# Patient Record
Sex: FEMALE | Race: BLACK OR AFRICAN AMERICAN | Employment: PART TIME
[De-identification: names, ages, dates, MRNs, and addresses within clinical notes are randomized per-mention and may not be internally consistent; named-entity substitution may affect disease eponyms.]

---

## 2017-02-03 ENCOUNTER — OFFICE VISIT (OUTPATIENT)
Dept: INTERNAL MEDICINE | Facility: CLINIC | Age: 47
End: 2017-02-03

## 2017-02-03 VITALS
OXYGEN SATURATION: 99 % | BODY MASS INDEX: 43.05 KG/M2 | WEIGHT: 228 LBS | RESPIRATION RATE: 17 BRPM | DIASTOLIC BLOOD PRESSURE: 68 MMHG | TEMPERATURE: 98 F | HEART RATE: 75 BPM | HEIGHT: 61 IN | SYSTOLIC BLOOD PRESSURE: 128 MMHG

## 2017-02-03 DIAGNOSIS — J45.30 MILD PERSISTENT ASTHMA WITHOUT COMPLICATION: ICD-10-CM

## 2017-02-03 DIAGNOSIS — K21.9 GASTROESOPHAGEAL REFLUX DISEASE WITHOUT ESOPHAGITIS: ICD-10-CM

## 2017-02-03 DIAGNOSIS — E66.01 MORBID OBESITY WITH BMI OF 40.0-44.9, ADULT (HCC): ICD-10-CM

## 2017-02-03 DIAGNOSIS — I10 ESSENTIAL HYPERTENSION: ICD-10-CM

## 2017-02-03 DIAGNOSIS — F41.9 ANXIETY: ICD-10-CM

## 2017-02-03 DIAGNOSIS — R53.83 OTHER FATIGUE: Primary | ICD-10-CM

## 2017-02-03 DIAGNOSIS — K59.09 OTHER CONSTIPATION: ICD-10-CM

## 2017-02-03 DIAGNOSIS — R73.01 IMPAIRED FASTING GLUCOSE: ICD-10-CM

## 2017-02-03 PROCEDURE — 99214 OFFICE O/P EST MOD 30 MIN: CPT | Performed by: FAMILY MEDICINE

## 2017-02-03 RX ORDER — DIAZEPAM 10 MG/1
TABLET ORAL
COMMUNITY
Start: 2017-01-25 | End: 2018-04-11 | Stop reason: ALTCHOICE

## 2017-02-03 RX ORDER — POLYETHYLENE GLYCOL 3350 17 G/17G
17 POWDER, FOR SOLUTION ORAL DAILY
Qty: 510 G | Refills: 2 | Status: SHIPPED | OUTPATIENT
Start: 2017-02-03 | End: 2017-03-05

## 2017-02-03 ASSESSMENT — ENCOUNTER SYMPTOMS
EYES NEGATIVE: 1
ALLERGIC/IMMUNOLOGIC NEGATIVE: 1
GASTROINTESTINAL NEGATIVE: 1
RESPIRATORY NEGATIVE: 1

## 2017-02-03 ASSESSMENT — PATIENT HEALTH QUESTIONNAIRE - PHQ9
SUM OF ALL RESPONSES TO PHQ QUESTIONS 1-9: 0
2. FEELING DOWN, DEPRESSED OR HOPELESS: 0
1. LITTLE INTEREST OR PLEASURE IN DOING THINGS: 0
SUM OF ALL RESPONSES TO PHQ9 QUESTIONS 1 & 2: 0

## 2017-02-07 RX ORDER — ALBUTEROL SULFATE 2.5 MG/3ML
2.5 SOLUTION RESPIRATORY (INHALATION) EVERY 6 HOURS PRN
Qty: 120 EACH | Refills: 3 | Status: SHIPPED | OUTPATIENT
Start: 2017-02-07

## 2017-02-08 RX ORDER — ALBUTEROL SULFATE 90 UG/1
2 AEROSOL, METERED RESPIRATORY (INHALATION) EVERY 6 HOURS PRN
Qty: 8.5 G | Refills: 4 | Status: SHIPPED | OUTPATIENT
Start: 2017-02-08 | End: 2017-09-07 | Stop reason: SDUPTHER

## 2017-02-15 ENCOUNTER — TELEPHONE (OUTPATIENT)
Dept: INTERNAL MEDICINE | Facility: CLINIC | Age: 47
End: 2017-02-15

## 2017-02-16 RX ORDER — FLUCONAZOLE 150 MG/1
150 TABLET ORAL ONCE
Qty: 1 TABLET | Refills: 0 | Status: SHIPPED | OUTPATIENT
Start: 2017-02-16 | End: 2017-02-16

## 2017-02-20 RX ORDER — FLUTICASONE PROPIONATE 50 MCG
SPRAY, SUSPENSION (ML) NASAL
Qty: 1 BOTTLE | Refills: 5 | Status: SHIPPED | OUTPATIENT
Start: 2017-02-20 | End: 2017-09-07 | Stop reason: SDUPTHER

## 2017-04-13 DIAGNOSIS — Z02.1 PHYSICAL EXAM, PRE-EMPLOYMENT: Primary | ICD-10-CM

## 2017-04-18 ENCOUNTER — TELEPHONE (OUTPATIENT)
Dept: INTERNAL MEDICINE CLINIC | Age: 47
End: 2017-04-18

## 2017-04-19 ENCOUNTER — HOSPITAL ENCOUNTER (OUTPATIENT)
Age: 47
Setting detail: SPECIMEN
Discharge: HOME | End: 2017-04-19
Payer: MEDICARE

## 2017-04-19 DIAGNOSIS — Z02.1 PHYSICAL EXAM, PRE-EMPLOYMENT: ICD-10-CM

## 2017-04-19 LAB — RUBV IGG SER QL: 28.5 IU/ML

## 2017-04-19 RX ORDER — BUSPIRONE HYDROCHLORIDE 15 MG/1
15 TABLET ORAL 3 TIMES DAILY
Qty: 90 TABLET | Refills: 0 | Status: SHIPPED | OUTPATIENT
Start: 2017-04-19 | End: 2018-04-11 | Stop reason: DRUGHIGH

## 2017-04-21 ENCOUNTER — TELEPHONE (OUTPATIENT)
Dept: INTERNAL MEDICINE CLINIC | Age: 47
End: 2017-04-21

## 2017-04-21 LAB
MEASLES IMMUNE (IGG): 4
MUV IGG SER QL: 5.58

## 2017-05-04 ENCOUNTER — TELEPHONE (OUTPATIENT)
Dept: INTERNAL MEDICINE CLINIC | Age: 47
End: 2017-05-04

## 2017-05-04 RX ORDER — HYDROCHLOROTHIAZIDE 25 MG/1
TABLET ORAL
Qty: 30 TABLET | Refills: 3 | Status: SHIPPED | OUTPATIENT
Start: 2017-05-04 | End: 2018-04-11 | Stop reason: ALTCHOICE

## 2017-05-04 RX ORDER — GLUCOSAMINE HCL/CHONDROITIN SU 500-400 MG
CAPSULE ORAL
Qty: 100 STRIP | Refills: 3 | Status: SHIPPED | OUTPATIENT
Start: 2017-05-04 | End: 2017-05-05 | Stop reason: SDUPTHER

## 2017-05-04 RX ORDER — LANSOPRAZOLE 30 MG/1
CAPSULE, DELAYED RELEASE ORAL
Qty: 30 CAPSULE | Refills: 3 | Status: SHIPPED | OUTPATIENT
Start: 2017-05-04 | End: 2018-03-21 | Stop reason: SDUPTHER

## 2017-05-06 RX ORDER — LANCETS 30 GAUGE
EACH MISCELLANEOUS
Qty: 100 EACH | Refills: 3 | Status: SHIPPED | OUTPATIENT
Start: 2017-05-06

## 2017-05-06 RX ORDER — GLUCOSAMINE HCL/CHONDROITIN SU 500-400 MG
CAPSULE ORAL
Qty: 100 STRIP | Refills: 3 | Status: SHIPPED | OUTPATIENT
Start: 2017-05-06 | End: 2020-03-24 | Stop reason: SDUPTHER

## 2017-07-20 ENCOUNTER — TELEPHONE (OUTPATIENT)
Dept: INTERNAL MEDICINE CLINIC | Age: 47
End: 2017-07-20

## 2017-07-20 RX ORDER — VARENICLINE TARTRATE 25 MG
KIT ORAL
Qty: 53 TABLET | Refills: 0 | Status: SHIPPED | OUTPATIENT
Start: 2017-07-20 | End: 2019-12-24 | Stop reason: ALTCHOICE

## 2017-08-11 ENCOUNTER — TELEPHONE (OUTPATIENT)
Dept: INTERNAL MEDICINE CLINIC | Age: 47
End: 2017-08-11

## 2017-08-14 RX ORDER — IBUPROFEN 800 MG/1
800 TABLET ORAL EVERY 8 HOURS PRN
Qty: 30 TABLET | Refills: 0 | Status: SHIPPED | OUTPATIENT
Start: 2017-08-14 | End: 2017-09-15 | Stop reason: SDUPTHER

## 2017-09-07 ENCOUNTER — OFFICE VISIT (OUTPATIENT)
Dept: INTERNAL MEDICINE CLINIC | Age: 47
End: 2017-09-07
Payer: MEDICARE

## 2017-09-07 VITALS
DIASTOLIC BLOOD PRESSURE: 88 MMHG | HEART RATE: 102 BPM | SYSTOLIC BLOOD PRESSURE: 130 MMHG | WEIGHT: 232.6 LBS | TEMPERATURE: 98.5 F | BODY MASS INDEX: 43.92 KG/M2 | OXYGEN SATURATION: 100 % | HEIGHT: 61 IN

## 2017-09-07 DIAGNOSIS — R73.01 IMPAIRED FASTING GLUCOSE: ICD-10-CM

## 2017-09-07 DIAGNOSIS — M21.41 PES PLANUS OF BOTH FEET: ICD-10-CM

## 2017-09-07 DIAGNOSIS — G89.4 CHRONIC PAIN SYNDROME: ICD-10-CM

## 2017-09-07 DIAGNOSIS — J45.20 MILD INTERMITTENT ASTHMA WITHOUT COMPLICATION: ICD-10-CM

## 2017-09-07 DIAGNOSIS — M21.42 PES PLANUS OF BOTH FEET: ICD-10-CM

## 2017-09-07 DIAGNOSIS — L30.4 INTERTRIGO: Primary | ICD-10-CM

## 2017-09-07 DIAGNOSIS — I10 ESSENTIAL HYPERTENSION: ICD-10-CM

## 2017-09-07 DIAGNOSIS — F41.9 ANXIETY: ICD-10-CM

## 2017-09-07 PROCEDURE — 99214 OFFICE O/P EST MOD 30 MIN: CPT | Performed by: FAMILY MEDICINE

## 2017-09-07 RX ORDER — FLUTICASONE PROPIONATE 50 MCG
SPRAY, SUSPENSION (ML) NASAL
Qty: 1 BOTTLE | Refills: 5 | Status: SHIPPED | OUTPATIENT
Start: 2017-09-07 | End: 2018-04-11 | Stop reason: SDUPTHER

## 2017-09-07 RX ORDER — ALBUTEROL SULFATE 90 UG/1
2 AEROSOL, METERED RESPIRATORY (INHALATION) EVERY 6 HOURS PRN
Qty: 8.5 G | Refills: 4 | Status: SHIPPED | OUTPATIENT
Start: 2017-09-07 | End: 2018-04-11 | Stop reason: SDUPTHER

## 2017-09-07 RX ORDER — NYSTATIN 100000 [USP'U]/G
POWDER TOPICAL
Qty: 1 BOTTLE | Refills: 2 | Status: SHIPPED | OUTPATIENT
Start: 2017-09-07 | End: 2018-04-11 | Stop reason: SDUPTHER

## 2017-09-07 ASSESSMENT — ENCOUNTER SYMPTOMS
EYES NEGATIVE: 1
GASTROINTESTINAL NEGATIVE: 1
ALLERGIC/IMMUNOLOGIC NEGATIVE: 1
RESPIRATORY NEGATIVE: 1

## 2017-09-18 RX ORDER — IBUPROFEN 800 MG/1
TABLET ORAL
Qty: 30 TABLET | Refills: 0 | Status: SHIPPED | OUTPATIENT
Start: 2017-09-18 | End: 2017-12-23 | Stop reason: SDUPTHER

## 2017-09-20 RX ORDER — BUSPIRONE HYDROCHLORIDE 10 MG/1
TABLET ORAL
Qty: 90 TABLET | Refills: 0 | Status: SHIPPED | OUTPATIENT
Start: 2017-09-20 | End: 2017-10-03 | Stop reason: SDUPTHER

## 2017-10-03 RX ORDER — BUSPIRONE HYDROCHLORIDE 10 MG/1
TABLET ORAL
Qty: 90 TABLET | Refills: 0 | Status: SHIPPED | OUTPATIENT
Start: 2017-10-03 | End: 2019-12-24 | Stop reason: ALTCHOICE

## 2017-12-26 RX ORDER — IBUPROFEN 800 MG/1
TABLET ORAL
Qty: 30 TABLET | Refills: 0 | Status: SHIPPED | OUTPATIENT
Start: 2017-12-26 | End: 2018-04-20 | Stop reason: SDUPTHER

## 2018-01-03 ENCOUNTER — TELEPHONE (OUTPATIENT)
Dept: INTERNAL MEDICINE CLINIC | Age: 48
End: 2018-01-03

## 2018-01-08 ENCOUNTER — TELEPHONE (OUTPATIENT)
Dept: INTERNAL MEDICINE CLINIC | Age: 48
End: 2018-01-08

## 2018-01-09 ENCOUNTER — TELEPHONE (OUTPATIENT)
Dept: INTERNAL MEDICINE CLINIC | Age: 48
End: 2018-01-09

## 2018-01-19 ENCOUNTER — TELEPHONE (OUTPATIENT)
Dept: INTERNAL MEDICINE CLINIC | Age: 48
End: 2018-01-19

## 2018-01-19 NOTE — TELEPHONE ENCOUNTER
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

## 2018-03-21 RX ORDER — LANSOPRAZOLE 30 MG/1
30 CAPSULE, DELAYED RELEASE ORAL DAILY
Qty: 30 CAPSULE | Refills: 3 | Status: SHIPPED | OUTPATIENT
Start: 2018-03-21

## 2018-03-26 RX ORDER — LISINOPRIL AND HYDROCHLOROTHIAZIDE 25; 20 MG/1; MG/1
TABLET ORAL
Qty: 30 TABLET | Refills: 3 | Status: SHIPPED | OUTPATIENT
Start: 2018-03-26 | End: 2018-09-02 | Stop reason: SDUPTHER

## 2018-04-11 ENCOUNTER — OFFICE VISIT (OUTPATIENT)
Dept: INTERNAL MEDICINE CLINIC | Age: 48
End: 2018-04-11
Payer: MEDICARE

## 2018-04-11 VITALS
RESPIRATION RATE: 21 BRPM | HEIGHT: 61 IN | SYSTOLIC BLOOD PRESSURE: 120 MMHG | HEART RATE: 83 BPM | OXYGEN SATURATION: 98 % | BODY MASS INDEX: 45.31 KG/M2 | WEIGHT: 240 LBS | DIASTOLIC BLOOD PRESSURE: 80 MMHG

## 2018-04-11 DIAGNOSIS — R06.83 SNORES: ICD-10-CM

## 2018-04-11 DIAGNOSIS — F41.9 ANXIETY: ICD-10-CM

## 2018-04-11 DIAGNOSIS — G89.4 CHRONIC PAIN SYNDROME: ICD-10-CM

## 2018-04-11 DIAGNOSIS — I10 ESSENTIAL HYPERTENSION: ICD-10-CM

## 2018-04-11 DIAGNOSIS — Z71.6 TOBACCO ABUSE COUNSELING: ICD-10-CM

## 2018-04-11 DIAGNOSIS — R73.01 IMPAIRED FASTING GLUCOSE: ICD-10-CM

## 2018-04-11 DIAGNOSIS — E66.01 MORBID OBESITY WITH BMI OF 40.0-44.9, ADULT (HCC): ICD-10-CM

## 2018-04-11 DIAGNOSIS — Z72.0 TOBACCO ABUSE: ICD-10-CM

## 2018-04-11 DIAGNOSIS — J44.9 CHRONIC OBSTRUCTIVE PULMONARY DISEASE, UNSPECIFIED COPD TYPE (HCC): Primary | ICD-10-CM

## 2018-04-11 DIAGNOSIS — E66.01 MORBID OBESITY WITH BMI OF 45.0-49.9, ADULT (HCC): ICD-10-CM

## 2018-04-11 PROCEDURE — G8427 DOCREV CUR MEDS BY ELIG CLIN: HCPCS | Performed by: FAMILY MEDICINE

## 2018-04-11 PROCEDURE — 4004F PT TOBACCO SCREEN RCVD TLK: CPT | Performed by: FAMILY MEDICINE

## 2018-04-11 PROCEDURE — G8926 SPIRO NO PERF OR DOC: HCPCS | Performed by: FAMILY MEDICINE

## 2018-04-11 PROCEDURE — 3023F SPIROM DOC REV: CPT | Performed by: FAMILY MEDICINE

## 2018-04-11 PROCEDURE — 99214 OFFICE O/P EST MOD 30 MIN: CPT | Performed by: FAMILY MEDICINE

## 2018-04-11 PROCEDURE — G8417 CALC BMI ABV UP PARAM F/U: HCPCS | Performed by: FAMILY MEDICINE

## 2018-04-11 RX ORDER — NYSTATIN 100000 [USP'U]/G
POWDER TOPICAL
Qty: 1 BOTTLE | Refills: 2 | Status: SHIPPED | OUTPATIENT
Start: 2018-04-11 | End: 2019-12-24 | Stop reason: ALTCHOICE

## 2018-04-11 RX ORDER — FLUTICASONE PROPIONATE 50 MCG
SPRAY, SUSPENSION (ML) NASAL
Qty: 1 BOTTLE | Refills: 5 | Status: SHIPPED | OUTPATIENT
Start: 2018-04-11 | End: 2019-12-13

## 2018-04-11 RX ORDER — ALBUTEROL SULFATE 90 UG/1
2 AEROSOL, METERED RESPIRATORY (INHALATION) EVERY 6 HOURS PRN
Qty: 8.5 G | Refills: 4 | Status: SHIPPED | OUTPATIENT
Start: 2018-04-11 | End: 2019-12-13 | Stop reason: SDUPTHER

## 2018-04-11 RX ORDER — LORATADINE 10 MG/1
10 TABLET ORAL DAILY
Qty: 90 TABLET | Refills: 0 | Status: SHIPPED | OUTPATIENT
Start: 2018-04-11 | End: 2019-02-17

## 2018-04-11 RX ORDER — ALBUTEROL SULFATE 90 UG/1
2 AEROSOL, METERED RESPIRATORY (INHALATION) EVERY 6 HOURS PRN
Qty: 8.5 G | Refills: 4 | Status: SHIPPED | OUTPATIENT
Start: 2018-04-11 | End: 2018-04-11 | Stop reason: SDUPTHER

## 2018-04-11 RX ORDER — BUDESONIDE AND FORMOTEROL FUMARATE DIHYDRATE 160; 4.5 UG/1; UG/1
2 AEROSOL RESPIRATORY (INHALATION) 2 TIMES DAILY
Qty: 1 INHALER | Refills: 2 | Status: SHIPPED | OUTPATIENT
Start: 2018-04-11 | End: 2019-12-24 | Stop reason: SDUPTHER

## 2018-04-11 ASSESSMENT — PATIENT HEALTH QUESTIONNAIRE - PHQ9
SUM OF ALL RESPONSES TO PHQ9 QUESTIONS 1 & 2: 0
SUM OF ALL RESPONSES TO PHQ QUESTIONS 1-9: 0
1. LITTLE INTEREST OR PLEASURE IN DOING THINGS: 0
2. FEELING DOWN, DEPRESSED OR HOPELESS: 0

## 2018-04-11 ASSESSMENT — ENCOUNTER SYMPTOMS
EYES NEGATIVE: 1
COUGH: 1
ALLERGIC/IMMUNOLOGIC NEGATIVE: 1
GASTROINTESTINAL NEGATIVE: 1

## 2018-04-11 ASSESSMENT — COPD QUESTIONNAIRES: COPD: 1

## 2018-04-20 RX ORDER — IBUPROFEN 800 MG/1
TABLET ORAL
Qty: 30 TABLET | Refills: 0 | Status: SHIPPED | OUTPATIENT
Start: 2018-04-20 | End: 2018-07-25 | Stop reason: ALTCHOICE

## 2018-05-03 ENCOUNTER — HOSPITAL ENCOUNTER (OUTPATIENT)
Dept: MAMMOGRAPHY | Age: 48
Discharge: HOME | End: 2018-05-05
Payer: MEDICARE

## 2018-05-03 DIAGNOSIS — Z12.31 VISIT FOR SCREENING MAMMOGRAM: ICD-10-CM

## 2018-05-03 PROCEDURE — 77063 BREAST TOMOSYNTHESIS BI: CPT

## 2018-07-19 RX ORDER — IBUPROFEN 800 MG/1
TABLET ORAL
Qty: 30 TABLET | Refills: 1 | OUTPATIENT
Start: 2018-07-19

## 2018-09-05 RX ORDER — LISINOPRIL AND HYDROCHLOROTHIAZIDE 25; 20 MG/1; MG/1
TABLET ORAL
Qty: 14 TABLET | Refills: 0 | Status: SHIPPED | OUTPATIENT
Start: 2018-09-05 | End: 2019-03-07 | Stop reason: SDUPTHER

## 2018-12-06 DIAGNOSIS — R73.01 IMPAIRED FASTING GLUCOSE: ICD-10-CM

## 2018-12-06 DIAGNOSIS — G89.4 CHRONIC PAIN SYNDROME: ICD-10-CM

## 2018-12-06 RX ORDER — FLUTICASONE PROPIONATE 50 MCG
SPRAY, SUSPENSION (ML) NASAL
Qty: 16 G | Refills: 5 | OUTPATIENT
Start: 2018-12-06

## 2018-12-22 DIAGNOSIS — R73.01 IMPAIRED FASTING GLUCOSE: ICD-10-CM

## 2018-12-22 DIAGNOSIS — G89.4 CHRONIC PAIN SYNDROME: ICD-10-CM

## 2019-02-13 ENCOUNTER — TELEPHONE (OUTPATIENT)
Dept: INTERNAL MEDICINE CLINIC | Age: 49
End: 2019-02-13

## 2019-02-17 ENCOUNTER — HOSPITAL ENCOUNTER (EMERGENCY)
Age: 49
Discharge: HOME | End: 2019-02-17
Attending: EMERGENCY MEDICINE
Payer: MEDICARE

## 2019-02-17 VITALS
DIASTOLIC BLOOD PRESSURE: 116 MMHG | BODY MASS INDEX: 39.65 KG/M2 | SYSTOLIC BLOOD PRESSURE: 170 MMHG | TEMPERATURE: 97.3 F | RESPIRATION RATE: 15 BRPM | HEART RATE: 105 BPM | OXYGEN SATURATION: 99 % | WEIGHT: 210 LBS

## 2019-02-17 DIAGNOSIS — H92.02 OTALGIA OF LEFT EAR: Primary | ICD-10-CM

## 2019-02-17 DIAGNOSIS — B37.2 YEAST DERMATITIS: ICD-10-CM

## 2019-02-17 PROCEDURE — 99282 EMERGENCY DEPT VISIT SF MDM: CPT

## 2019-02-17 RX ORDER — CETIRIZINE HYDROCHLORIDE 10 MG/1
10 TABLET ORAL DAILY
Qty: 7 TABLET | Refills: 0 | Status: SHIPPED | OUTPATIENT
Start: 2019-02-17 | End: 2019-02-24

## 2019-02-17 RX ORDER — CLOTRIMAZOLE 1 %
CREAM (GRAM) TOPICAL
Qty: 1 TUBE | Refills: 0 | Status: SHIPPED | OUTPATIENT
Start: 2019-02-17 | End: 2019-02-24

## 2019-02-17 ASSESSMENT — ENCOUNTER SYMPTOMS
EYE DISCHARGE: 0
BACK PAIN: 0
NAUSEA: 0
EYE ITCHING: 0
COUGH: 1
VOMITING: 0
SORE THROAT: 0
EYE PAIN: 0
COLOR CHANGE: 0
RHINORRHEA: 0
WHEEZING: 0

## 2019-02-17 ASSESSMENT — PAIN DESCRIPTION - PAIN TYPE: TYPE: ACUTE PAIN

## 2019-02-17 ASSESSMENT — PAIN DESCRIPTION - LOCATION: LOCATION: EAR

## 2019-02-17 ASSESSMENT — PAIN SCALES - GENERAL: PAINLEVEL_OUTOF10: 4

## 2019-02-26 ENCOUNTER — TELEPHONE (OUTPATIENT)
Dept: INTERNAL MEDICINE CLINIC | Age: 49
End: 2019-02-26

## 2019-02-27 RX ORDER — LISINOPRIL AND HYDROCHLOROTHIAZIDE 25; 20 MG/1; MG/1
TABLET ORAL
Qty: 14 TABLET | Refills: 0 | OUTPATIENT
Start: 2019-02-27

## 2019-03-01 RX ORDER — LISINOPRIL AND HYDROCHLOROTHIAZIDE 25; 20 MG/1; MG/1
TABLET ORAL
Qty: 14 TABLET | Refills: 0 | OUTPATIENT
Start: 2019-03-01

## 2019-03-08 RX ORDER — LISINOPRIL AND HYDROCHLOROTHIAZIDE 25; 20 MG/1; MG/1
TABLET ORAL
Qty: 14 TABLET | Refills: 0 | Status: SHIPPED | OUTPATIENT
Start: 2019-03-08 | End: 2019-05-01 | Stop reason: SDUPTHER

## 2019-03-12 ENCOUNTER — TELEPHONE (OUTPATIENT)
Dept: INTERNAL MEDICINE CLINIC | Age: 49
End: 2019-03-12

## 2019-05-02 RX ORDER — LISINOPRIL AND HYDROCHLOROTHIAZIDE 25; 20 MG/1; MG/1
TABLET ORAL
Qty: 90 TABLET | Refills: 1 | Status: SHIPPED | OUTPATIENT
Start: 2019-05-02 | End: 2019-12-24 | Stop reason: SDUPTHER

## 2019-06-28 DIAGNOSIS — R73.01 IMPAIRED FASTING GLUCOSE: ICD-10-CM

## 2019-06-28 DIAGNOSIS — G89.4 CHRONIC PAIN SYNDROME: ICD-10-CM

## 2019-07-03 ENCOUNTER — TELEPHONE (OUTPATIENT)
Dept: INTERNAL MEDICINE CLINIC | Age: 49
End: 2019-07-03

## 2019-07-03 DIAGNOSIS — Z12.31 SCREENING MAMMOGRAM, ENCOUNTER FOR: Primary | ICD-10-CM

## 2019-07-08 ENCOUNTER — HOSPITAL ENCOUNTER (EMERGENCY)
Age: 49
Discharge: HOME | End: 2019-07-08
Attending: EMERGENCY MEDICINE
Payer: MEDICARE

## 2019-07-08 VITALS
SYSTOLIC BLOOD PRESSURE: 144 MMHG | TEMPERATURE: 98.4 F | OXYGEN SATURATION: 97 % | HEART RATE: 78 BPM | RESPIRATION RATE: 18 BRPM | DIASTOLIC BLOOD PRESSURE: 70 MMHG

## 2019-07-08 DIAGNOSIS — M67.40 GANGLION CYST: Primary | ICD-10-CM

## 2019-07-08 PROCEDURE — 99282 EMERGENCY DEPT VISIT SF MDM: CPT

## 2019-07-08 ASSESSMENT — ENCOUNTER SYMPTOMS
BACK PAIN: 0
COUGH: 0
VOMITING: 0
NAUSEA: 0
ABDOMINAL PAIN: 0
SHORTNESS OF BREATH: 0
COLOR CHANGE: 0
SORE THROAT: 0
DIARRHEA: 0

## 2019-07-08 NOTE — ED PROVIDER NOTES
I performed a history and physical examination of the patient and discussed management with the resident. I reviewed the residents note and agree with the documented findings and plan of care. Any areas of disagreement are noted on the chart. I was personally present for the key portions of any procedures. I have documented in the chart those procedures where I was not present during the key portions. I have reviewed the emergency nurses triage note. I agree with the chief complaint, past medical history, past surgical history, allergies, medications, social and family history as documented unless otherwise noted below. Documentation of the HPI, Physical Exam and Medical Decision Making performed by medical students or scribes is based on my personal performance of the HPI, PE and MDM. For Phys Assistant/ Nurse Practitioner cases/documentation I have personally evaluated this patient and have completed at least one if not all key elements of the E/M (history, physical exam, and MDM). I find the patient's history and physical exam are consistent with the NP/PA documentation. I agree with the care provided, treatment rendered, disposition and followup plan. Additional findings are as noted. Fan Pugh. Skip Dewitt MD  Attending Emergency  Physician    'SWELLING' DORSUM OF LEFT WRIST FOR PAST WEEK. NO TRAUMA. NO FEVER, CHILLS, RASH.  RHD. AWAKE, ALERT, COOP, RESPONSIVE. LEFT UPPER EXTR/WRIST-SMALL, FIRM, SMOOTHER, MOBILE SUBCUTANEOUS MASS DORSORADIAL ASPECT MIDWRIST. NO ERYTHEMA, WARMTH, FLUCTUANCE, CREPITUS. NORMAL AROM OF HAND ON WRIST, DIGITS ON HAND. IMP-GANGLION CYST LEFT WRIST  PLAN-DISCHARGE, F/U WITH DR. POLO-CALL IN AM. RETURN IF SX WORSEN OR PROGRESS.           Daysi Collins MD  07/08/19 3733
UTERUS      x 2    STERILIZATION  12/6/2007    hysteroscopic blockage of left fallopian tube       CURRENT MEDICATIONS       Discharge Medication List as of 7/8/2019  7:02 PM      CONTINUE these medications which have NOT CHANGED    Details   lisinopril-hydrochlorothiazide (PRINZIDE;ZESTORETIC) 20-25 MG per tablet take 1 tablet by mouth once daily, Disp-90 tablet, R-1Normal      Chlorphen-Phenyleph-APAP (CORICIDIN D COLD/FLU/SINUS) 2-5-325 MG TABS Take 1 tablet by mouth every 8 hours as needed (cough, ear pain), Disp-20 tablet, R-0Print      ibuprofen (IBU) 400 MG tablet Take 1 tablet by mouth every 8 hours as needed for Pain, Disp-120 tablet, R-1Normal      fluticasone (FLONASE) 50 MCG/ACT nasal spray instill 2 sprays into each nostril once daily, Disp-1 Bottle, R-5Normal      nystatin (MYCOSTATIN) 774177 UNIT/GM powder Apply 3 times daily. , Disp-1 Bottle, R-2, Normal      albuterol sulfate HFA (PROAIR HFA) 108 (90 Base) MCG/ACT inhaler Inhale 2 puffs into the lungs every 6 hours as needed for Wheezing, Disp-8.5 g, R-4. Normal      budesonide-formoterol (SYMBICORT) 160-4.5 MCG/ACT AERO Inhale 2 puffs into the lungs 2 times daily, Disp-1 Inhaler, R-2Normal      lansoprazole (PREVACID) 30 MG delayed release capsule Take 1 capsule by mouth daily, Disp-30 capsule, R-3Normal      busPIRone (BUSPAR) 10 MG tablet take 1 tablet by mouth three times a day, Disp-90 tablet, R-0Normal      varenicline (CHANTIX STARTING MONTH HAFSA) 0.5 MG X 11 & 1 MG X 42 tablet Take by mouth., Disp-53 tablet, R-0Normal      Glucose Blood (BLOOD GLUCOSE TEST STRIPS) STRP E11.9, Disp-100 strip, R-3Please dispense what ever test strip that the insurance will be coveredNormal      Lancets MISC Disp-100 each, R-3, NormalUse once or twice daily      Blood Glucose Monitoring Suppl KIT 2 TIMES DAILY Starting 5/4/2017, Until Discontinued, Disp-1 kit, R-0, NormalE 11.9      albuterol (PROVENTIL) (2.5 MG/3ML) 0.083% nebulizer solution Take 3 mLs by

## 2019-09-04 DIAGNOSIS — R73.01 IMPAIRED FASTING GLUCOSE: ICD-10-CM

## 2019-09-04 DIAGNOSIS — G89.4 CHRONIC PAIN SYNDROME: ICD-10-CM

## 2019-11-25 ENCOUNTER — HOSPITAL ENCOUNTER (EMERGENCY)
Age: 49
Discharge: HOME | End: 2019-11-25
Attending: EMERGENCY MEDICINE
Payer: MEDICARE

## 2019-11-25 VITALS
HEIGHT: 65 IN | OXYGEN SATURATION: 98 % | BODY MASS INDEX: 41.65 KG/M2 | SYSTOLIC BLOOD PRESSURE: 132 MMHG | WEIGHT: 250 LBS | DIASTOLIC BLOOD PRESSURE: 78 MMHG | HEART RATE: 94 BPM | TEMPERATURE: 97.5 F | RESPIRATION RATE: 16 BRPM

## 2019-11-25 DIAGNOSIS — J06.9 VIRAL URI WITH COUGH: Primary | ICD-10-CM

## 2019-11-25 PROCEDURE — 99282 EMERGENCY DEPT VISIT SF MDM: CPT

## 2019-11-25 RX ORDER — IBUPROFEN 600 MG/1
600 TABLET ORAL EVERY 6 HOURS PRN
Qty: 30 TABLET | Refills: 0 | Status: SHIPPED | OUTPATIENT
Start: 2019-11-25 | End: 2020-03-22

## 2019-11-26 ASSESSMENT — ENCOUNTER SYMPTOMS
EYE PAIN: 0
BLOOD IN STOOL: 0
VOMITING: 0
WHEEZING: 0
COUGH: 1
DIARRHEA: 0
SINUS PAIN: 1
APNEA: 0
RHINORRHEA: 0
EYE REDNESS: 0
ABDOMINAL PAIN: 0
SINUS PRESSURE: 1
SHORTNESS OF BREATH: 0
NAUSEA: 0

## 2019-12-13 ENCOUNTER — OFFICE VISIT (OUTPATIENT)
Dept: PRIMARY CARE CLINIC | Age: 49
End: 2019-12-13
Payer: MEDICARE

## 2019-12-13 VITALS
OXYGEN SATURATION: 96 % | DIASTOLIC BLOOD PRESSURE: 88 MMHG | TEMPERATURE: 98.2 F | WEIGHT: 220 LBS | BODY MASS INDEX: 36.61 KG/M2 | HEART RATE: 92 BPM | SYSTOLIC BLOOD PRESSURE: 123 MMHG

## 2019-12-13 DIAGNOSIS — J06.9 VIRAL URI WITH COUGH: Primary | ICD-10-CM

## 2019-12-13 PROCEDURE — 99213 OFFICE O/P EST LOW 20 MIN: CPT | Performed by: NURSE PRACTITIONER

## 2019-12-13 PROCEDURE — 3017F COLORECTAL CA SCREEN DOC REV: CPT | Performed by: NURSE PRACTITIONER

## 2019-12-13 PROCEDURE — G8484 FLU IMMUNIZE NO ADMIN: HCPCS | Performed by: NURSE PRACTITIONER

## 2019-12-13 PROCEDURE — G8417 CALC BMI ABV UP PARAM F/U: HCPCS | Performed by: NURSE PRACTITIONER

## 2019-12-13 PROCEDURE — G8427 DOCREV CUR MEDS BY ELIG CLIN: HCPCS | Performed by: NURSE PRACTITIONER

## 2019-12-13 PROCEDURE — 4004F PT TOBACCO SCREEN RCVD TLK: CPT | Performed by: NURSE PRACTITIONER

## 2019-12-13 RX ORDER — ALBUTEROL SULFATE 90 UG/1
2 AEROSOL, METERED RESPIRATORY (INHALATION) EVERY 6 HOURS PRN
Qty: 8.5 G | Refills: 0 | Status: SHIPPED | OUTPATIENT
Start: 2019-12-13 | End: 2020-10-09 | Stop reason: SDUPTHER

## 2019-12-13 RX ORDER — FLUTICASONE PROPIONATE 50 MCG
2 SPRAY, SUSPENSION (ML) NASAL DAILY
Qty: 1 BOTTLE | Refills: 0 | Status: SHIPPED | OUTPATIENT
Start: 2019-12-13

## 2019-12-13 ASSESSMENT — PATIENT HEALTH QUESTIONNAIRE - PHQ9
2. FEELING DOWN, DEPRESSED OR HOPELESS: 0
1. LITTLE INTEREST OR PLEASURE IN DOING THINGS: 0
SUM OF ALL RESPONSES TO PHQ QUESTIONS 1-9: 0
SUM OF ALL RESPONSES TO PHQ QUESTIONS 1-9: 0
SUM OF ALL RESPONSES TO PHQ9 QUESTIONS 1 & 2: 0

## 2019-12-13 ASSESSMENT — ENCOUNTER SYMPTOMS
SINUS PAIN: 0
COUGH: 0
SORE THROAT: 0

## 2019-12-22 ENCOUNTER — HOSPITAL ENCOUNTER (EMERGENCY)
Age: 49
Discharge: HOME | End: 2019-12-22
Attending: EMERGENCY MEDICINE
Payer: MEDICARE

## 2019-12-22 VITALS
RESPIRATION RATE: 15 BRPM | OXYGEN SATURATION: 98 % | SYSTOLIC BLOOD PRESSURE: 150 MMHG | HEART RATE: 90 BPM | DIASTOLIC BLOOD PRESSURE: 94 MMHG | TEMPERATURE: 97.5 F
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
## 2019-12-22 DIAGNOSIS — H11.31 SUBCONJUNCTIVAL HEMORRHAGE OF RIGHT EYE: ICD-10-CM

## 2019-12-22 DIAGNOSIS — R51.9 ACUTE NONINTRACTABLE HEADACHE, UNSPECIFIED HEADACHE TYPE: Primary | ICD-10-CM

## 2019-12-22 PROCEDURE — 99283 EMERGENCY DEPT VISIT LOW MDM: CPT

## 2019-12-22 PROCEDURE — 6370000000 HC RX 637 (ALT 250 FOR IP): Performed by: STUDENT IN AN ORGANIZED HEALTH CARE EDUCATION/TRAINING PROGRAM

## 2019-12-22 RX ORDER — ACETAMINOPHEN 500 MG
1000 TABLET ORAL ONCE
Status: COMPLETED | OUTPATIENT
Start: 2019-12-22 | End: 2019-12-22

## 2019-12-22 RX ORDER — ACETAMINOPHEN 500 MG
1000 TABLET ORAL 3 TIMES DAILY
Qty: 180 TABLET | Refills: 0 | Status: SHIPPED | OUTPATIENT
Start: 2019-12-22 | End: 2020-03-22

## 2019-12-22 RX ADMIN — ACETAMINOPHEN 1000 MG: 500 TABLET ORAL at 12:19

## 2019-12-22 ASSESSMENT — ENCOUNTER SYMPTOMS
NAUSEA: 0
EYE REDNESS: 1
EYE PAIN: 0
ABDOMINAL PAIN: 0
COUGH: 0
DIARRHEA: 0
EYE DISCHARGE: 0
SHORTNESS OF BREATH: 0
VOMITING: 0
PHOTOPHOBIA: 0
SORE THROAT: 0

## 2019-12-22 ASSESSMENT — PAIN SCALES - GENERAL: PAINLEVEL_OUTOF10: 5

## 2019-12-24 ENCOUNTER — OFFICE VISIT (OUTPATIENT)
Dept: INTERNAL MEDICINE CLINIC | Age: 49
End: 2019-12-24
Payer: MEDICARE

## 2019-12-24 VITALS
HEIGHT: 65 IN | TEMPERATURE: 96.2 F | DIASTOLIC BLOOD PRESSURE: 86 MMHG | SYSTOLIC BLOOD PRESSURE: 122 MMHG | RESPIRATION RATE: 24 BRPM | OXYGEN SATURATION: 98 % | HEART RATE: 92 BPM | WEIGHT: 220.4 LBS | BODY MASS INDEX: 36.72 KG/M2

## 2019-12-24 DIAGNOSIS — M54.9 CHRONIC MIDLINE BACK PAIN, UNSPECIFIED BACK LOCATION: ICD-10-CM

## 2019-12-24 DIAGNOSIS — G89.29 CHRONIC MIDLINE BACK PAIN, UNSPECIFIED BACK LOCATION: ICD-10-CM

## 2019-12-24 DIAGNOSIS — J44.9 CHRONIC OBSTRUCTIVE PULMONARY DISEASE, UNSPECIFIED COPD TYPE (HCC): Primary | ICD-10-CM

## 2019-12-24 DIAGNOSIS — I10 ESSENTIAL HYPERTENSION: ICD-10-CM

## 2019-12-24 DIAGNOSIS — F32.A ANXIETY AND DEPRESSION: ICD-10-CM

## 2019-12-24 DIAGNOSIS — Z12.39 BREAST SCREENING: ICD-10-CM

## 2019-12-24 DIAGNOSIS — F41.9 ANXIETY AND DEPRESSION: ICD-10-CM

## 2019-12-24 DIAGNOSIS — Z91.199 NONCOMPLIANCE: ICD-10-CM

## 2019-12-24 DIAGNOSIS — J30.1 SEASONAL ALLERGIC RHINITIS DUE TO POLLEN: ICD-10-CM

## 2019-12-24 DIAGNOSIS — F41.9 ANXIETY: ICD-10-CM

## 2019-12-24 DIAGNOSIS — Z72.0 TOBACCO ABUSE: ICD-10-CM

## 2019-12-24 DIAGNOSIS — R73.01 IMPAIRED FASTING GLUCOSE: ICD-10-CM

## 2019-12-24 LAB — HBA1C MFR BLD: 5.6 %

## 2019-12-24 PROCEDURE — 99214 OFFICE O/P EST MOD 30 MIN: CPT | Performed by: FAMILY MEDICINE

## 2019-12-24 PROCEDURE — G8482 FLU IMMUNIZE ORDER/ADMIN: HCPCS | Performed by: FAMILY MEDICINE

## 2019-12-24 PROCEDURE — G8417 CALC BMI ABV UP PARAM F/U: HCPCS | Performed by: FAMILY MEDICINE

## 2019-12-24 PROCEDURE — 90670 PCV13 VACCINE IM: CPT | Performed by: FAMILY MEDICINE

## 2019-12-24 PROCEDURE — 4004F PT TOBACCO SCREEN RCVD TLK: CPT | Performed by: FAMILY MEDICINE

## 2019-12-24 PROCEDURE — 83036 HEMOGLOBIN GLYCOSYLATED A1C: CPT | Performed by: FAMILY MEDICINE

## 2019-12-24 PROCEDURE — G0009 ADMIN PNEUMOCOCCAL VACCINE: HCPCS | Performed by: FAMILY MEDICINE

## 2019-12-24 PROCEDURE — 3017F COLORECTAL CA SCREEN DOC REV: CPT | Performed by: FAMILY MEDICINE

## 2019-12-24 PROCEDURE — G8926 SPIRO NO PERF OR DOC: HCPCS | Performed by: FAMILY MEDICINE

## 2019-12-24 PROCEDURE — G8427 DOCREV CUR MEDS BY ELIG CLIN: HCPCS | Performed by: FAMILY MEDICINE

## 2019-12-24 PROCEDURE — G0008 ADMIN INFLUENZA VIRUS VAC: HCPCS | Performed by: FAMILY MEDICINE

## 2019-12-24 PROCEDURE — 90688 IIV4 VACCINE SPLT 0.5 ML IM: CPT | Performed by: FAMILY MEDICINE

## 2019-12-24 PROCEDURE — 3023F SPIROM DOC REV: CPT | Performed by: FAMILY MEDICINE

## 2019-12-24 RX ORDER — BUDESONIDE AND FORMOTEROL FUMARATE DIHYDRATE 160; 4.5 UG/1; UG/1
2 AEROSOL RESPIRATORY (INHALATION) 2 TIMES DAILY
Qty: 1 INHALER | Refills: 2 | Status: SHIPPED | OUTPATIENT
Start: 2019-12-24 | End: 2020-10-09 | Stop reason: SDUPTHER

## 2019-12-24 RX ORDER — FERROUS SULFATE 325(65) MG
325 TABLET ORAL 2 TIMES DAILY
Qty: 30 TABLET | Refills: 0 | Status: SHIPPED | OUTPATIENT
Start: 2019-12-24 | End: 2020-10-09

## 2019-12-24 RX ORDER — LISINOPRIL AND HYDROCHLOROTHIAZIDE 25; 20 MG/1; MG/1
TABLET ORAL
Qty: 90 TABLET | Refills: 1 | Status: SHIPPED | OUTPATIENT
Start: 2019-12-24 | End: 2020-03-23

## 2019-12-24 RX ORDER — BUPROPION HYDROCHLORIDE 150 MG/1
150 TABLET, EXTENDED RELEASE ORAL 2 TIMES DAILY
Qty: 60 TABLET | Refills: 3 | Status: SHIPPED | OUTPATIENT
Start: 2019-12-24 | End: 2020-10-09

## 2019-12-24 ASSESSMENT — ENCOUNTER SYMPTOMS
SHORTNESS OF BREATH: 1
GASTROINTESTINAL NEGATIVE: 1
COUGH: 1
BACK PAIN: 1
EYES NEGATIVE: 1
ALLERGIC/IMMUNOLOGIC NEGATIVE: 1

## 2019-12-24 ASSESSMENT — COPD QUESTIONNAIRES: COPD: 1

## 2019-12-26 ENCOUNTER — HOSPITAL ENCOUNTER (OUTPATIENT)
Age: 49
Setting detail: SPECIMEN
Discharge: HOME | End: 2019-12-26
Payer: MEDICARE

## 2019-12-26 DIAGNOSIS — R73.01 IMPAIRED FASTING GLUCOSE: ICD-10-CM

## 2019-12-26 DIAGNOSIS — I10 ESSENTIAL HYPERTENSION: ICD-10-CM

## 2019-12-26 LAB
ALBUMIN SERPL-MCNC: 3.8 G/DL (ref 3.5–5.2)
ALBUMIN/GLOBULIN RATIO: 1 (ref 1–2.5)
ALP BLD-CCNC: 55 U/L (ref 35–104)
ALT SERPL-CCNC: 13 U/L (ref 5–33)
ANION GAP SERPL CALCULATED.3IONS-SCNC: 13 MMOL/L (ref 9–17)
AST SERPL-CCNC: 16 U/L
BILIRUB SERPL-MCNC: 0.39 MG/DL (ref 0.3–1.2)
BUN BLDV-MCNC: 14 MG/DL (ref 6–20)
BUN/CREAT BLD: ABNORMAL (ref 9–20)
CALCIUM SERPL-MCNC: 8.7 MG/DL (ref 8.6–10.4)
CHLORIDE BLD-SCNC: 105 MMOL/L (ref 98–107)
CHOLESTEROL/HDL RATIO: 3.3
CHOLESTEROL: 179 MG/DL
CO2: 24 MMOL/L (ref 20–31)
CREAT SERPL-MCNC: 0.58 MG/DL (ref 0.5–0.9)
ESTIMATED AVERAGE GLUCOSE: 117 MG/DL
GFR AFRICAN AMERICAN: >60 ML/MIN
GFR NON-AFRICAN AMERICAN: >60 ML/MIN
GFR SERPL CREATININE-BSD FRML MDRD: ABNORMAL ML/MIN/{1.73_M2}
GFR SERPL CREATININE-BSD FRML MDRD: ABNORMAL ML/MIN/{1.73_M2}
GLUCOSE BLD-MCNC: 102 MG/DL (ref 70–99)
HBA1C MFR BLD: 5.7 % (ref 4–6)
HCT VFR BLD CALC: 44.9 % (ref 36.3–47.1)
HDLC SERPL-MCNC: 54 MG/DL
HEMOGLOBIN: 14.8 G/DL (ref 11.9–15.1)
LDL CHOLESTEROL: 73 MG/DL (ref 0–130)
MCH RBC QN AUTO: 30.8 PG (ref 25.2–33.5)
MCHC RBC AUTO-ENTMCNC: 33 G/DL (ref 28.4–34.8)
MCV RBC AUTO: 93.5 FL (ref 82.6–102.9)
NRBC AUTOMATED: 0 PER 100 WBC
PDW BLD-RTO: 12.2 % (ref 11.8–14.4)
PLATELET # BLD: 268 K/UL (ref 138–453)
PMV BLD AUTO: 9.4 FL (ref 8.1–13.5)
POTASSIUM SERPL-SCNC: 3.8 MMOL/L (ref 3.7–5.3)
RBC # BLD: 4.8 M/UL (ref 3.95–5.11)
SODIUM BLD-SCNC: 142 MMOL/L (ref 135–144)
TOTAL PROTEIN: 7.7 G/DL (ref 6.4–8.3)
TRIGL SERPL-MCNC: 260 MG/DL
TSH SERPL DL<=0.05 MIU/L-ACNC: 1.27 MIU/L (ref 0.3–5)
VLDLC SERPL CALC-MCNC: ABNORMAL MG/DL (ref 1–30)
WBC # BLD: 3.6 K/UL (ref 3.5–11.3)

## 2020-01-04 ENCOUNTER — HOSPITAL ENCOUNTER (OUTPATIENT)
Dept: GENERAL RADIOLOGY | Age: 50
Discharge: HOME | End: 2020-01-06
Payer: MEDICARE

## 2020-01-04 ENCOUNTER — HOSPITAL ENCOUNTER (OUTPATIENT)
Age: 50
Discharge: HOME | End: 2020-01-06
Payer: MEDICARE

## 2020-01-04 PROCEDURE — 73560 X-RAY EXAM OF KNEE 1 OR 2: CPT

## 2020-01-04 PROCEDURE — 72110 X-RAY EXAM L-2 SPINE 4/>VWS: CPT

## 2020-03-22 ENCOUNTER — HOSPITAL ENCOUNTER (EMERGENCY)
Age: 50
Discharge: HOME | End: 2020-03-22
Attending: EMERGENCY MEDICINE
Payer: MEDICARE

## 2020-03-22 VITALS
OXYGEN SATURATION: 97 % | HEART RATE: 102 BPM | SYSTOLIC BLOOD PRESSURE: 140 MMHG | DIASTOLIC BLOOD PRESSURE: 93 MMHG | RESPIRATION RATE: 18 BRPM | TEMPERATURE: 97.3 F

## 2020-03-22 PROCEDURE — 6370000000 HC RX 637 (ALT 250 FOR IP): Performed by: STUDENT IN AN ORGANIZED HEALTH CARE EDUCATION/TRAINING PROGRAM

## 2020-03-22 PROCEDURE — 99283 EMERGENCY DEPT VISIT LOW MDM: CPT

## 2020-03-22 RX ORDER — ACETAMINOPHEN 325 MG/1
650 TABLET ORAL EVERY 6 HOURS PRN
Qty: 30 TABLET | Refills: 0 | Status: SHIPPED | OUTPATIENT
Start: 2020-03-22

## 2020-03-22 RX ORDER — BENZONATATE 100 MG/1
100 CAPSULE ORAL ONCE
Status: COMPLETED | OUTPATIENT
Start: 2020-03-22 | End: 2020-03-22

## 2020-03-22 RX ORDER — IBUPROFEN 600 MG/1
600 TABLET ORAL EVERY 6 HOURS PRN
Qty: 30 TABLET | Refills: 0 | Status: SHIPPED | OUTPATIENT
Start: 2020-03-22

## 2020-03-22 RX ORDER — BENZONATATE 100 MG/1
100 CAPSULE ORAL 3 TIMES DAILY PRN
Qty: 21 CAPSULE | Refills: 0 | Status: SHIPPED | OUTPATIENT
Start: 2020-03-22 | End: 2020-03-29

## 2020-03-22 RX ORDER — ACETAMINOPHEN 500 MG
500 TABLET ORAL ONCE
Status: COMPLETED | OUTPATIENT
Start: 2020-03-22 | End: 2020-03-22

## 2020-03-22 RX ORDER — IBUPROFEN 400 MG/1
400 TABLET ORAL ONCE
Status: COMPLETED | OUTPATIENT
Start: 2020-03-22 | End: 2020-03-22

## 2020-03-22 RX ORDER — POLYMYXIN B SULFATE AND TRIMETHOPRIM 1; 10000 MG/ML; [USP'U]/ML
1 SOLUTION OPHTHALMIC EVERY 4 HOURS
Qty: 1 BOTTLE | Refills: 0 | Status: SHIPPED | OUTPATIENT
Start: 2020-03-22 | End: 2020-04-01

## 2020-03-22 RX ADMIN — IBUPROFEN 400 MG: 400 TABLET, FILM COATED ORAL at 11:08

## 2020-03-22 RX ADMIN — BENZONATATE 100 MG: 100 CAPSULE ORAL at 11:08

## 2020-03-22 RX ADMIN — ACETAMINOPHEN 500 MG: 500 TABLET ORAL at 11:08

## 2020-03-22 ASSESSMENT — PAIN SCALES - GENERAL: PAINLEVEL_OUTOF10: 0

## 2020-03-22 NOTE — ED PROVIDER NOTES
sulfate HFA (PROAIR HFA) 108 (90 Base) MCG/ACT inhaler Inhale 2 puffs into the lungs every 6 hours as needed for Wheezing 12/13/19   BLAINE Ledezma CNP   fluticasone [facility]) 50 MCG/ACT nasal spray 2 sprays by Nasal route daily 12/13/19   BLAINE Ledezma CNP   Chlorphen-Phenyleph-APAP (CORICIDIN D COLD/FLU/SINUS) 2-5-325 MG TABS Take 1 tablet by mouth every 8 hours as needed (cough, ear pain)  Patient not taking: Reported on 12/24/2019 2/17/19   Tamara Rosas PA-C   lansoprazole (PREVACID) 30 MG delayed release capsule Take 1 capsule by mouth daily  Patient not taking: Reported on 12/24/2019 3/21/18   Teresa Terrazas MD   Glucose Blood (BLOOD GLUCOSE TEST STRIPS) STRP E11.9 5/6/17   Eloy Isaac MD   Lancets MISC Use once or twice daily 5/6/17   Eloy Isaac MD   Blood Glucose Monitoring Suppl KIT 1 each by Does not apply route 2 times daily E 11.9 5/4/17   Eloy Isaac MD   albuterol (PROVENTIL) (2.5 MG/3ML) 0.083% nebulizer solution Take 3 mLs by nebulization every 6 hours as needed for Wheezing  Patient not taking: Reported on 12/24/2019 2/7/17   Teresa Terrazas MD   tears naturale II (CELLUGEL) SOLN ophthalmic solution Place 1 drop into both eyes 3 times daily as needed for Dry Eyes 12/1/15   Teresa Terrazas MD       REVIEW OF SYSTEMS    (2-9 systems for level 4, 10 or more for level 5)      Constitutional : - fever , - chills, - weight change  HENT: - hearing loss , + rhinorrhea , - tinnitus , - trouble swallowing , - voice change, +conjunctiviti   Eyes: - photophobia , - visual disturbance  Resp: + cough , - shortness of breath   Cardio: - chest pain , - leg swelling , - palpitations  GI: - nausea, - vomiting, - abd pain , - black/bloody BMs, - constipation , - diarrhea   Endocrine: - heat intolerance , - cold intolerance  : - dysuria, - frequency, - hematuria   MSK: - back pain , - neck pain   ALG: - food allergies  Neuro: - dizziness ,  - headache, - weakness, - syncope  Skin: - wound , - rash   Heme: - bruise easily  Psych: - agitation , - confusion      PHYSICAL EXAM   (up to 7 for level 4, 8 or more for level 5)     INITIAL VITALS:  oral temperature is 97.3 °F (36.3 °C). Her blood pressure is 140/93 (abnormal) and her pulse is 102. Her respiration is 18 and oxygen saturation is 97%. Physical Exam  GENERAL: Not in acute distress, well developed, not diaphoretic  HENT: normocephalic, nose nml. There is bilateral conjunctival injection with watery discharge. Oral pharyngeal injections without exudation. Uvula is midline. No cervical adenopathy. CHEST: clear lung sounds, no WRR. EYES: No sclearal icterus, no occular discharge  NECK: No JVD, trachea midline  PULM: Effort normal, no audible wheezing or stridor  NEURO: Alert, awake, responsive      DIFFERENTIAL  DIAGNOSIS/ MDM   PLAN (LABS / IMAGING / EKG):  No orders of the defined types were placed in this encounter. MEDICATIONS ORDERED:  Orders Placed This Encounter   Medications    acetaminophen (TYLENOL) tablet 500 mg    ibuprofen (ADVIL;MOTRIN) tablet 400 mg    benzonatate (TESSALON) capsule 100 mg    acetaminophen (TYLENOL) 325 MG tablet     Sig: Take 2 tablets by mouth every 6 hours as needed for Pain     Dispense:  30 tablet     Refill:  0    ibuprofen (ADVIL;MOTRIN) 600 MG tablet     Sig: Take 1 tablet by mouth every 6 hours as needed for Pain     Dispense:  30 tablet     Refill:  0    benzonatate (TESSALON PERLES) 100 MG capsule     Sig: Take 1 capsule by mouth 3 times daily as needed for Cough     Dispense:  21 capsule     Refill:  0    trimethoprim-polymyxin b (POLYTRIM) 98871-1.1 UNIT/ML-% ophthalmic solution     Sig: Place 1 drop into both eyes every 4 hours for 10 days     Dispense:  1 Bottle     Refill:  0         MDM:    Melo Eugene is a 44 y.o. female who presents with upper respiratory symptoms as well as concern for possible conjunctivitis.   Due to [facility] discharge conjunctival injection as well as documented upper respiratory symptoms will provide treatment for presumed bacterial conjunctivitis. Will treat with Polytrim drops. Tessalon Perles and analgesia. EMERGENCY DEPARTMENT COURSE:         DIAGNOSTIC RESULTS / EMERGENCYDEPARTMENT COURSE   LABS:  Labs Reviewed - No data to display    RADIOLOGY:  No results found. PROCEDURES:      CONSULTS:  None    CRITICAL CARE:  Please see attending note    FINAL IMPRESSION     1. Acute upper respiratory infection    2. Other conjunctivitis, unspecified laterality          DISPOSITION / PLAN   DISPOSITION Decision To Discharge 03/22/2020 10:49:18 AM       Evaluation and treatment course in the ED, and plan of care upon discharge was discussed in length with the patient. Patient had no further questions prior to being discharged and was instructed to return to the ED for new or worsening symptoms. Any changes to existing medications or new prescriptions were reviewed with patient and they expressed understanding of how to correctly take their medications and the possible common side effects. The patient understands that at this time there is no evidence for a more malignant underlying process, but the patient also understands that early in the process of an illness or injury, an emergency department workup can be falsely reassuring. Routine discharge counseling was given, and the patient understands that worsening, changing or persistent symptoms should prompt an immediate call or follow up with his/her primary physician or return to the emergency department. The importance of appropriate follow up was also discussed. More extensive discharge instructions were given in the patients discharge paperwork.     PATIENT REFERRED TO:  Arpita Alvarez MD  [address] [address] [facility]  [phone]    Schedule an appointment as soon as possible for a visit in 2 days  Return to the ER if worsening or any

## 2020-03-22 NOTE — ED TRIAGE NOTES
Pt presented to ed via self c/o cough, sore throat, and right eye drainage ongoing for about a week. Pt is A&Ox4 with even non labored breaths. Pt ambulated to room with steady gait. Pt denies recent travel. Pt states she is around multiple kids. Pt denies fevers/chills.

## 2020-03-23 ENCOUNTER — CARE COORDINATION (OUTPATIENT)
Dept: CARE COORDINATION | Age: 50
End: 2020-03-23

## 2020-03-23 NOTE — CARE COORDINATION
Patient contacted regarding recent discharge and COVID-19 risk   Care Transition Nurse/ Ambulatory Care Manager contacted the patient by telephone to perform post discharge assessment. Verified name and  with patient as identifiers. Patient has following risk factors of: heart failure and asthma. CTN/ACM reviewed discharge instructions, medical action plan and red flags related to discharge diagnosis. Reviewed and educated them on any new and changed medications related to discharge diagnosis. Advised obtaining a 90-day supply of all daily and as-needed medications. Education provided regarding infection prevention, and signs and symptoms of COVID-19 and when to seek medical attention with patient who verbalized understanding. Discussed exposure protocols and quarantine from [address] you at higher risk for severe illness  and given an opportunity for questions and concerns. The patient agrees to contact the COVID-19 hotline [phone] or PCP office for questions related to their healthcare. CTN/ACM provided contact information for future reference. From CDC: Are you at higher risk for severe illness?  Wash your hands often.  Avoid close contact (6 feet, which is about two arm lengths) with people who are sick.  Put distance between yourself and other people if COVID-19 is spreading in your community.  Clean and disinfect frequently touched surfaces.  Avoid all cruise travel and non-essential air travel.  Call your healthcare professional if you have concerns about COVID-19 and your underlying condition or if you are sick.     For more information on steps you can take to protect yourself, see CDC's How to Protect Yourself   Will Follow up in 2 weeks

## 2020-03-24 RX ORDER — GLUCOSAMINE HCL/CHONDROITIN SU 500-400 MG
CAPSULE ORAL
Qty: 100 STRIP | Refills: 3 | Status: SHIPPED | OUTPATIENT
Start: 2020-03-24

## 2020-03-24 RX ORDER — LISINOPRIL AND HYDROCHLOROTHIAZIDE 25; 20 MG/1; MG/1
TABLET ORAL
Qty: 30 TABLET | Refills: 0 | Status: SHIPPED | OUTPATIENT
Start: 2020-03-24 | End: 2020-06-16

## 2020-03-24 NOTE — TELEPHONE ENCOUNTER
Pt called in to request order for blood glucose meter and supplies. Pt has not been checking regularly due to lost machine. Pt controls sugars with diet.

## 2020-04-06 ENCOUNTER — CARE COORDINATION (OUTPATIENT)
Dept: CARE COORDINATION | Age: 50
End: 2020-04-06

## 2020-04-06 NOTE — CARE COORDINATION
You Patient resolved from the Care Transitions episode on 4/6/2020  Patient/family has been provided the following resources and education related to COVID-19:                         Signs, symptoms and red flags related to COVID-19            CDC exposure and quarantine guidelines            [facility] exposure contact - [phone]            Contact for their local Department of Health                 Patient currently reports that the following symptoms have improved:  cough and no new/worsening symptoms     No further outreach scheduled with this CTN/ACM. Episode of Care resolved. Patient has this CTN/ACM contact information if future needs arise.

## 2020-06-16 RX ORDER — LISINOPRIL AND HYDROCHLOROTHIAZIDE 25; 20 MG/1; MG/1
TABLET ORAL
Qty: 30 TABLET | Refills: 0 | Status: SHIPPED | OUTPATIENT
Start: 2020-06-16 | End: 2020-10-19

## 2020-06-24 ENCOUNTER — TELEPHONE (OUTPATIENT)
Dept: FAMILY MEDICINE CLINIC | Age: 50
End: 2020-06-24

## 2020-09-08 ENCOUNTER — OFFICE VISIT (OUTPATIENT)
Dept: PRIMARY CARE CLINIC | Age: 50
End: 2020-09-08
Payer: MEDICARE

## 2020-09-08 VITALS
HEIGHT: 62 IN | TEMPERATURE: 98.8 F | OXYGEN SATURATION: 100 % | SYSTOLIC BLOOD PRESSURE: 120 MMHG | HEART RATE: 90 BPM | BODY MASS INDEX: 40.31 KG/M2 | DIASTOLIC BLOOD PRESSURE: 77 MMHG

## 2020-09-08 PROCEDURE — 99396 PREV VISIT EST AGE 40-64: CPT | Performed by: INTERNAL MEDICINE

## 2020-09-08 NOTE — PROGRESS NOTES
2020      Mariangel Restrepo (:  1970) is a 44 y.o. female, here for a preventive medicine evaluation. Needs a childcare physical. [facility] Patient Active Problem List   Diagnosis    HTN (hypertension)    Anxiety    Back pain    Impaired fasting glucose    Chronic obstructive pulmonary disease (HCC)    Tobacco abuse    Seasonal allergic rhinitis due to pollen    Anxiety and depression    Noncompliance       Review of Systems   All other systems reviewed and are negative. Prior to Visit Medications    Medication Sig Taking?  Authorizing Provider   lisinopril-hydroCHLOROthiazide (PRINZIDE;ZESTORETIC) 20-25 MG per tablet take 1 tablet by mouth once daily Yes Nahomi Abdul MD   blood glucose monitor strips E11.9 Yes Nahomi Abdul MD   Blood Glucose Monitoring Suppl KIT 1 each by Does not apply route 2 times daily E 11.9 Yes Nahomi Abdul MD   acetaminophen (TYLENOL) 325 MG tablet Take 2 tablets by mouth every 6 hours as needed for Pain Yes Federico Berry DO   budesonide-formoterol (SYMBICORT) 160-4.5 MCG/ACT AERO Inhale 2 puffs into the lungs 2 times daily Yes Nahomi Abdul MD   albuterol sulfate HFA (PROAIR HFA) 108 (90 Base) MCG/ACT inhaler Inhale 2 puffs into the lungs every 6 hours as needed for Wheezing Yes BLAINE Wiggins CNP   fluticasone (FLONASE) 50 MCG/ACT nasal spray 2 sprays by Nasal route daily Yes BLAINE Wiggins CNP   Lancets MISC Use once or twice daily Yes Oits Olivia MD   albuterol (PROVENTIL) (2.5 MG/3ML) 0.083% nebulizer solution Take 3 mLs by nebulization every 6 hours as needed for Wheezing Yes Nahomi Abdul MD   tears naturale II (CELLUGEL) SOLN ophthalmic solution Place 1 drop into both eyes 3 times daily as needed for Dry Eyes Yes Nahoim Abdul MD   ibuprofen (ADVIL;MOTRIN) 600 MG tablet Take 1 tablet by mouth every 6 hours as needed for Pain  Patient not taking: Reported on 2020  Geraldine Black ELAINE Berry DO   ferrous sulfate 325 (65 Fe) MG tablet Take 1 tablet by mouth 2 times daily  Patient not taking: Reported on 2020  Retta Brunner, MD   buPROPion [facility]) 150 MG extended release tablet Take 1 tablet by mouth 2 times daily  Patient not taking: Reported on 2020  Retta Brunner, MD   Chlorphen-Phenyleph-APAP (CORICIDIN D COLD/FLU/SINUS) 2-5-325 MG TABS Take 1 tablet by mouth every 8 hours as needed (cough, ear pain)  Patient not taking: Reported on 2019  Stevo Willson PA-C   lansoprazole (PREVACID) 30 MG delayed release capsule Take 1 capsule by mouth daily  Patient not taking: Reported on 2019  Retta Brunner, MD        Allergies   Allergen Reactions    Seasonal        Past Medical History:   Diagnosis Date    Asthma     Diabetes mellitus ([facility] [address]     on no medications    HSV (herpes simplex virus) infection     HTN (hypertension)     Seasonal allergies        Past Surgical History:   Procedure Laterality Date     SECTION      DILATION AND CURETTAGE OF UTERUS      x 2    STERILIZATION  2007    hysteroscopic blockage of left fallopian tube       Social History     Socioeconomic History    Marital status: Single     Spouse name: Not on file    Number of children: Not on file    Years of education: Not on file    Highest education level: Not on file   Occupational History    Not on file   Social Needs    Financial resource strain: Not on file    Food insecurity     Worry: Not on file     Inability: Not on file   [employer] needs     Medical: Not on file     Non-medical: Not on file   Tobacco Use    Smoking status: Current Every Day Smoker     Packs/day: 1.00     Years: 10.00     Pack years: 10.00     Types: Cigarettes    Smokeless tobacco: Never Used   Substance and Sexual Activity    Alcohol use: Yes     Comment: occasionally    Drug use: No    Sexual activity: Not on file   Lifestyle    Physical activity     Days per week: Not on file Minutes per session: Not on file    Stress: Not on file   Relationships    Social connections     Talks on phone: Not on file     Gets together: Not on file     Attends [employer] service: Not on file     Active member of club or organization: Not on file     Attends meetings of clubs or organizations: Not on file     Relationship status: Not on file    Intimate partner violence     Fear of current or ex partner: Not on file     Emotionally abused: Not on file     Physically abused: Not on file     Forced sexual activity: Not on file   Other Topics Concern    Not on file   Social History Narrative    Not on file        Family History   Problem Relation Age of Onset    Diabetes Mother     Heart Disease Mother     Stomach Cancer Maternal Grandmother     Cancer Maternal Grandmother     Diabetes Brother        ADVANCE DIRECTIVE: N, <no information>    Vitals:    09/08/20 1009   BP: 120/77   Site: Right Upper Arm   Position: Sitting   Cuff Size: Medium Adult   Pulse: 90   Temp: 98.8 °F (37.1 °C)   TempSrc: Oral   SpO2: 100%   Height: 5' 2\" (1.575 m)     Estimated body mass index is 40.31 kg/m² as calculated from the following:    Height as of this encounter: 5' 2\" (1.575 m). Weight as of 12/24/19: 220 lb 6.4 oz (100 kg). Physical Exam  Vitals signs reviewed. Constitutional:       Appearance: Normal appearance. HENT:      Head: Normocephalic and atraumatic. Skin:     General: Skin is warm and dry. Neurological:      General: No focal deficit present. Mental Status: She is alert and oriented to person, place, and time. Psychiatric:         Mood and Affect: Mood normal.         Behavior: Behavior normal.         No flowsheet data found.     Lab Results   Component Value Date    CHOL 179 12/26/2019    CHOL 145 11/14/2016    CHOL 177 02/24/2015    TRIG 260 12/26/2019    TRIG 209 11/14/2016    TRIG 157 02/24/2015    HDL 54 12/26/2019    HDL 59 11/14/2016    HDL 51 02/24/2015    LDLCHOLESTEROL 73 12/26/2019    LDLCHOLESTEROL 44 11/14/2016    LDLCHOLESTEROL 95 02/24/2015    GLUCOSE 102 12/26/2019    LABA1C 5.7 12/26/2019    LABA1C 5.6 12/24/2019    LABA1C 5.8 11/14/2016       The 10-year ASCVD risk score (Jo-Ann Morgan, et al., 2013) is: 5.2%    Values used to calculate the score:      Age: 44 years      Sex: Female      Is Non- : Yes      Diabetic: No      Tobacco smoker: Yes      Systolic Blood Pressure: 129 mmHg      Is BP treated: Yes      HDL Cholesterol: 54 mg/dL      Total Cholesterol: 179 mg/dL    Immunization History   Administered Date(s) Administered    Influenza Virus Vaccine 09/01/2017    Influenza, Franklyn Matilde, IM, (6 mo and older Fluzone, Flulaval, Fluarix and 3 yrs and older Afluria) 10/03/2016, 12/24/2019    Influenza, Franklyn Husky, IM, PF (6 mo and older Fluzone, Flulaval, Fluarix, and 3 yrs and older Afluria) 10/26/2015, 09/01/2017, 12/31/2018    Pneumococcal Conjugate 13-valent (Alena Stalling) 09/01/2017, 12/24/2019       Health Maintenance   Topic Date Due    DTaP/Tdap/Td vaccine (1 - Tdap) 12/08/1987    Colon Cancer Screen FIT/FOBT  12/08/2018    Annual Wellness Visit (AWV)  06/23/2019    Pneumococcal 0-64 years Vaccine (1 of 1 - PPSV23) 02/18/2020    Breast cancer screen  05/03/2020    Flu vaccine (1) 09/01/2020    Cervical cancer screen  12/24/2020 (Originally 4/7/2019)    Shingles Vaccine (1 of 2) 12/24/2020 (Originally 12/8/2018)    A1C test (Diabetic or Prediabetic)  12/26/2020    Potassium monitoring  12/26/2020    Creatinine monitoring  12/26/2020    Lipid screen  12/26/2024    HIV screen  Completed    Hepatitis A vaccine  Aged Out    Hepatitis B vaccine  Aged Out    Hib vaccine  Aged Out    Meningococcal (ACWY) vaccine  Aged Out       ASSESSMENT/PLAN:  There are no diagnoses linked to this encounter. No follow-ups on file. An electronic signature was used to authenticate this note.     --Karen Ramirez MD on 9/8/2020 at 10:22 AM

## 2020-10-09 ENCOUNTER — TELEPHONE (OUTPATIENT)
Dept: INTERNAL MEDICINE CLINIC | Age: 50
End: 2020-10-09

## 2020-10-09 ENCOUNTER — OFFICE VISIT (OUTPATIENT)
Dept: INTERNAL MEDICINE CLINIC | Age: 50
End: 2020-10-09
Payer: MEDICARE

## 2020-10-09 ENCOUNTER — HOSPITAL ENCOUNTER (OUTPATIENT)
Age: 50
Setting detail: SPECIMEN
Discharge: HOME | End: 2020-10-09
Payer: MEDICARE

## 2020-10-09 VITALS
OXYGEN SATURATION: 98 % | SYSTOLIC BLOOD PRESSURE: 118 MMHG | WEIGHT: 179.2 LBS | TEMPERATURE: 96.9 F | RESPIRATION RATE: 24 BRPM | HEIGHT: 62 IN | DIASTOLIC BLOOD PRESSURE: 78 MMHG | BODY MASS INDEX: 32.97 KG/M2 | HEART RATE: 92 BPM

## 2020-10-09 LAB
ALBUMIN SERPL-MCNC: 4.1 G/DL (ref 3.5–5.2)
ALBUMIN/GLOBULIN RATIO: 1.1 (ref 1–2.5)
ALP BLD-CCNC: 64 U/L (ref 35–104)
ALT SERPL-CCNC: 12 U/L (ref 5–33)
ANION GAP SERPL CALCULATED.3IONS-SCNC: 17 MMOL/L (ref 9–17)
AST SERPL-CCNC: 16 U/L
BILIRUB SERPL-MCNC: 0.24 MG/DL (ref 0.3–1.2)
BUN BLDV-MCNC: 15 MG/DL (ref 6–20)
BUN/CREAT BLD: ABNORMAL (ref 9–20)
CALCIUM SERPL-MCNC: 9.5 MG/DL (ref 8.6–10.4)
CHLORIDE BLD-SCNC: 100 MMOL/L (ref 98–107)
CHOLESTEROL/HDL RATIO: 2.3
CHOLESTEROL: 165 MG/DL
CO2: 22 MMOL/L (ref 20–31)
CREAT SERPL-MCNC: 0.93 MG/DL (ref 0.5–0.9)
CREATININE URINE: 49.1 MG/DL (ref 28–217)
ESTIMATED AVERAGE GLUCOSE: 88 MG/DL
GFR AFRICAN AMERICAN: >60 ML/MIN
GFR NON-AFRICAN AMERICAN: >60 ML/MIN
GFR SERPL CREATININE-BSD FRML MDRD: ABNORMAL ML/MIN/{1.73_M2}
GFR SERPL CREATININE-BSD FRML MDRD: ABNORMAL ML/MIN/{1.73_M2}
GLUCOSE BLD-MCNC: 99 MG/DL (ref 70–99)
HBA1C MFR BLD: 4.7 % (ref 4–6)
HCT VFR BLD CALC: 41.9 % (ref 36.3–47.1)
HDLC SERPL-MCNC: 72 MG/DL
HEMOGLOBIN: 13.4 G/DL (ref 11.9–15.1)
LDL CHOLESTEROL: 74 MG/DL (ref 0–130)
MCH RBC QN AUTO: 33.3 PG (ref 25.2–33.5)
MCHC RBC AUTO-ENTMCNC: 32 G/DL (ref 28.4–34.8)
MCV RBC AUTO: 104.2 FL (ref 82.6–102.9)
MICROALBUMIN/CREAT 24H UR: <12 MG/L
MICROALBUMIN/CREAT UR-RTO: NORMAL MCG/MG CREAT
NRBC AUTOMATED: 0 PER 100 WBC
PDW BLD-RTO: 14 % (ref 11.8–14.4)
PLATELET # BLD: 289 K/UL (ref 138–453)
PMV BLD AUTO: 9.3 FL (ref 8.1–13.5)
POTASSIUM SERPL-SCNC: 3.8 MMOL/L (ref 3.7–5.3)
RBC # BLD: 4.02 M/UL (ref 3.95–5.11)
SODIUM BLD-SCNC: 139 MMOL/L (ref 135–144)
TOTAL PROTEIN: 7.7 G/DL (ref 6.4–8.3)
TRIGL SERPL-MCNC: 95 MG/DL
TSH SERPL DL<=0.05 MIU/L-ACNC: 1.45 MIU/L (ref 0.3–5)
VLDLC SERPL CALC-MCNC: NORMAL MG/DL (ref 1–30)
WBC # BLD: 5.3 K/UL (ref 3.5–11.3)

## 2020-10-09 PROCEDURE — 3017F COLORECTAL CA SCREEN DOC REV: CPT | Performed by: FAMILY MEDICINE

## 2020-10-09 PROCEDURE — 4004F PT TOBACCO SCREEN RCVD TLK: CPT | Performed by: FAMILY MEDICINE

## 2020-10-09 PROCEDURE — G8427 DOCREV CUR MEDS BY ELIG CLIN: HCPCS | Performed by: FAMILY MEDICINE

## 2020-10-09 PROCEDURE — G8417 CALC BMI ABV UP PARAM F/U: HCPCS | Performed by: FAMILY MEDICINE

## 2020-10-09 PROCEDURE — G8926 SPIRO NO PERF OR DOC: HCPCS | Performed by: FAMILY MEDICINE

## 2020-10-09 PROCEDURE — G0008 ADMIN INFLUENZA VIRUS VAC: HCPCS | Performed by: FAMILY MEDICINE

## 2020-10-09 PROCEDURE — 90686 IIV4 VACC NO PRSV 0.5 ML IM: CPT | Performed by: FAMILY MEDICINE

## 2020-10-09 PROCEDURE — 99214 OFFICE O/P EST MOD 30 MIN: CPT | Performed by: FAMILY MEDICINE

## 2020-10-09 PROCEDURE — 3023F SPIROM DOC REV: CPT | Performed by: FAMILY MEDICINE

## 2020-10-09 PROCEDURE — G8484 FLU IMMUNIZE NO ADMIN: HCPCS | Performed by: FAMILY MEDICINE

## 2020-10-09 RX ORDER — BUDESONIDE AND FORMOTEROL FUMARATE DIHYDRATE 160; 4.5 UG/1; UG/1
2 AEROSOL RESPIRATORY (INHALATION) 2 TIMES DAILY
Qty: 1 INHALER | Refills: 5 | Status: SHIPPED | OUTPATIENT
Start: 2020-10-09

## 2020-10-09 RX ORDER — ALBUTEROL SULFATE 90 UG/1
2 AEROSOL, METERED RESPIRATORY (INHALATION) EVERY 6 HOURS PRN
Qty: 8.5 G | Refills: 5 | Status: SHIPPED | OUTPATIENT
Start: 2020-10-09

## 2020-10-09 ASSESSMENT — ENCOUNTER SYMPTOMS
GASTROINTESTINAL NEGATIVE: 1
COUGH: 1
ALLERGIC/IMMUNOLOGIC NEGATIVE: 1
EYES NEGATIVE: 1

## 2020-10-09 ASSESSMENT — COPD QUESTIONNAIRES: COPD: 1

## 2020-10-09 NOTE — PROGRESS NOTES
Subjective:      Patient ID: Tab Sherman is a 44 y.o. female. COPD   She complains of cough. This is a chronic problem. The current episode started more than 1 month ago. The problem occurs intermittently. The problem has been waxing and waning. The cough is non-productive. Her symptoms are aggravated by emotional stress, change in weather, exposure to fumes, exposure to smoke and pollen. Her symptoms are alleviated by beta-agonist and steroid inhaler. She reports moderate improvement on treatment. Risk factors for lung disease include smoking/tobacco exposure. Her past medical history is significant for COPD. Review of Systems   Constitutional: Negative. HENT: Negative. Eyes: Negative. Respiratory: Positive for cough. Cardiovascular: Negative. Gastrointestinal: Negative. Endocrine: Negative. Musculoskeletal: Positive for arthralgias. Skin: Negative. Allergic/Immunologic: Negative. Neurological: Negative. Hematological: Negative. Psychiatric/Behavioral: The patient is nervous/anxious. Past family and social history unremarkable. Diagnosis Orders   1. Chronic obstructive pulmonary disease, unspecified COPD type ([facility] [address]     2. Essential hypertension  Cologuard (For External Results Only)    CBC    Comprehensive Metabolic Panel    Hemoglobin A1C    Lipid Panel    Microalbumin, Ur    TSH without Reflex   3. Other chronic back pain     4. Impaired fasting glucose  Cologuard (For External Results Only)    CBC    Comprehensive Metabolic Panel    Hemoglobin A1C    Lipid Panel    Microalbumin, Ur    TSH without Reflex   5. Tobacco abuse     6. Seasonal allergic rhinitis due to pollen     7. Anxiety and depression     8. Noncompliance     9. Need for influenza vaccination     10. Colon cancer screening  Cologuard (For External Results Only)   11.  Breast screening  EDWAR DIGITAL SCREEN W OR WO CAD BILATERAL         Objective:   Physical Exam  Vitals signs and nursing note reviewed. Constitutional:       Appearance: She is well-developed. Comments: Obesity   HENT:      Head: Normocephalic and atraumatic. Right Ear: External ear normal.      Left Ear: External ear normal.   Eyes:      Conjunctiva/sclera: Conjunctivae normal.      Pupils: Pupils are equal, round, and reactive to light. Neck:      Musculoskeletal: Normal range of motion and neck supple. Cardiovascular:      Rate and Rhythm: Normal rate and regular rhythm. Heart sounds: Normal heart sounds. Comments: Hypertension  Hyperlipidemia  Impaired fasting glucose  Pulmonary:      Effort: Pulmonary effort is normal.      Breath sounds: Normal breath sounds. Comments: COPD-smoker  Abdominal:      General: Bowel sounds are normal.      Palpations: Abdomen is soft. Genitourinary:     Vagina: Normal.   Musculoskeletal: Normal range of motion. Comments: Musculoskeletal back pain   Skin:     General: Skin is warm and dry. Neurological:      Mental Status: She is alert and oriented to person, place, and time. Deep Tendon Reflexes: Reflexes are normal and symmetric. Psychiatric:      Comments: Anxiety         Assessment:       Diagnosis Orders   1. Chronic obstructive pulmonary disease, unspecified COPD type ([facility] [address]     2. Essential hypertension  Cologuard (For External Results Only)    CBC    Comprehensive Metabolic Panel    Hemoglobin A1C    Lipid Panel    Microalbumin, Ur    TSH without Reflex   3. Other chronic back pain     4. Impaired fasting glucose  Cologuard (For External Results Only)    CBC    Comprehensive Metabolic Panel    Hemoglobin A1C    Lipid Panel    Microalbumin, Ur    TSH without Reflex   5. Tobacco abuse     6. Seasonal allergic rhinitis due to pollen     7. Anxiety and depression     8. Noncompliance     9. Need for influenza vaccination     10. Colon cancer screening  Cologuard (For External Results Only)   11.  Breast screening  EDWAR DIGITAL SCREEN W OR WO CAD BILATERAL Plan:      1year-old overweight -American female returns in follow-up. She is afebrile hemodynamically stable, clinical examination is benign  Obesity. Patient states that she is gradually losing weight in response to diet and lifestyle change. She would benefit from weight loss to keep BMI around 25. Low-fat high-fiber diet, daily moderate exercise  COPD. She continued to smoke however states that she is significantly cut down. No recent pulmonary decompensation. Continue beta agonist and inhaled corticosteroid  Impaired fasting glucose with A1c of 5.7. Risk factor stratification is advised  GERD stable on proton pump inhibitor  Allergies allergic rhinitis. Continue Flonase, nasal saline  Musculoskeletal pain. Sparing use of Motrin-caution GERD. She will benefit to rather take Tylenol instead  Hypertension well controlled with Zestoretic that she is tolerating well. Consume less than 2 g of salt a day  Anxiety however denies being depressed. Reassurance provided  She denies excessive alcohol or illicit drug use  He will be provided with Cologuard  Screening mammography has been ordered  Further recommendations to follow labs  Med list reviewed, refills provided per request  She is encouraged to call for any concern  Immunizations are being updated today  This note is created with a voice recognition program and while intend to generate a document that accurately reflects the content of the visit, no guarantee can be provided that every mistake has been identified and corrected by editing.             Dee Fishman MD

## 2020-10-19 RX ORDER — LISINOPRIL AND HYDROCHLOROTHIAZIDE 25; 20 MG/1; MG/1
TABLET ORAL
Qty: 90 TABLET | Refills: 0 | Status: SHIPPED | OUTPATIENT
Start: 2020-10-19 | End: 2021-03-12

## 2020-10-19 NOTE — TELEPHONE ENCOUNTER
Krista Loco is calling to request a refill on the following medication(s):    Medication Request:    Last filled 6/16/2020 #30 with 0 RF    Requested Prescriptions     Pending Prescriptions Disp Refills    lisinopril-hydroCHLOROthiazide (PRINZIDE;ZESTORETIC) 20-25 MG per tablet [Pharmacy Med Name: LISINOPRIL-HCTZ 20-25 MG TAB] 30 tablet 0     Sig: take 1 tablet by mouth once daily       Last Visit Date (If Applicable):  67/4/7603    Next Visit Date:    1/11/2021

## 2020-10-26 RX ORDER — FERROUS SULFATE 325(65) MG
TABLET ORAL
Qty: 30 TABLET | OUTPATIENT
Start: 2020-10-26

## 2021-01-05 RX ORDER — FERROUS SULFATE 325(65) MG
TABLET ORAL
Qty: 200 TABLET | Refills: 1 | Status: SHIPPED | OUTPATIENT
Start: 2021-01-05

## 2021-01-05 NOTE — TELEPHONE ENCOUNTER
Miguel Rodríguez is calling to request a refill on the following medication(s):    Medication Request:  Requested Prescriptions     Pending Prescriptions Disp Refills    FEROSUL 325 (65 Fe) MG tablet [Pharmacy Med Name: FERROUS SULFATE 325 MG TABLET] 30 tablet      Sig: take 1 tablet by mouth twice a day       Last Visit Date (If Applicable):  75/1/3964    Next Visit Date:    1/11/2021

## 2021-03-12 RX ORDER — LISINOPRIL AND HYDROCHLOROTHIAZIDE 25; 20 MG/1; MG/1
TABLET ORAL
Qty: 20 TABLET | Refills: 0 | Status: SHIPPED | OUTPATIENT
Start: 2021-03-12

## 2021-03-12 NOTE — TELEPHONE ENCOUNTER
Isidro Parsons is calling to request a refill on the following medication(s):    Medication Request:  Requested Prescriptions     Pending Prescriptions Disp Refills    lisinopril-hydroCHLOROthiazide (PRINZIDE;ZESTORETIC) 20-25 MG per tablet [Pharmacy Med Name: LISINOPRIL-HCTZ 20-25 MG TAB] 90 tablet 0     Sig: take 1 tablet by mouth once daily     Last filled 10/19/20 90 day no refill  Order pending    Last Visit Date (If Applicable):  18/02/71    Next Visit Date:    Visit date not found

## 2021-04-09 RX ORDER — LISINOPRIL AND HYDROCHLOROTHIAZIDE 25; 20 MG/1; MG/1
TABLET ORAL
Qty: 20 TABLET | Refills: 0 | OUTPATIENT
Start: 2021-04-09

## 2021-04-09 NOTE — TELEPHONE ENCOUNTER
Rachelle Campbell is calling to request a refill on the following medication(s):    Medication Request:  Requested Prescriptions     Pending Prescriptions Disp Refills    lisinopril-hydroCHLOROthiazide (PRINZIDE;ZESTORETIC) 20-25 MG per tablet [Pharmacy Med Name: LISINOPRIL-HCTZ 20-25 MG TAB] 20 tablet 0     Sig: take 1 tablet by mouth once daily       Last Visit Date (If Applicable):  74/33/63    Next Visit Date:    Visit date not found

## 2021-07-08 RX ORDER — LISINOPRIL AND HYDROCHLOROTHIAZIDE 25; 20 MG/1; MG/1
TABLET ORAL
Qty: 20 TABLET | Refills: 0 | OUTPATIENT
Start: 2021-07-08

## 2021-08-23 ENCOUNTER — TELEPHONE (OUTPATIENT)
Dept: FAMILY MEDICINE CLINIC | Age: 51
End: 2021-08-23

## 2021-08-23 NOTE — TELEPHONE ENCOUNTER
Yvonne Vaca was contacted as part of mammography outreach.  Left voicemail with a reminder for the patient to schedule their mammogram.     Sweta Landry

## 2022-07-11 RX ORDER — LISINOPRIL AND HYDROCHLOROTHIAZIDE 25; 20 MG/1; MG/1
TABLET ORAL
Qty: 20 TABLET | Refills: 0 | OUTPATIENT
Start: 2022-07-11

## 2022-07-11 NOTE — TELEPHONE ENCOUNTER
[facility] is calling to request a refill on the following medication(s):    Medication Request:  Requested Prescriptions     Pending Prescriptions Disp Refills    lisinopril-hydroCHLOROthiazide (PRINZIDE;ZESTORETIC) 20-25 MG per tablet [Pharmacy Med Name: LISINOPRIL-HCTZ 20-25 MG TAB] 20 tablet 0     Sig: take 1 tablet by mouth once daily       Last Visit Date (If Applicable):  77/6/4584    Next Visit Date:    Visit date not found

## 2022-08-29 RX ORDER — LISINOPRIL AND HYDROCHLOROTHIAZIDE 25; 20 MG/1; MG/1
TABLET ORAL
Qty: 20 TABLET | Refills: 0 | OUTPATIENT
Start: 2022-08-29

## 2022-08-29 NOTE — TELEPHONE ENCOUNTER
France Caballero is calling to request a refill on the following medication(s):    Medication Request:  Requested Prescriptions     Pending Prescriptions Disp Refills    lisinopril-hydroCHLOROthiazide (PRINZIDE;ZESTORETIC) 20-25 MG per tablet [Pharmacy Med Name: LISINOPRIL-HCTZ 20-25 MG TAB] 20 tablet 0     Sig: take 1 tablet by mouth once daily       Last Visit Date (If Applicable):  57/8/7455    Next Visit Date:    Visit date not found

## 2022-10-14 ENCOUNTER — HOSPITAL ENCOUNTER (OUTPATIENT)
Dept: GENERAL RADIOLOGY | Age: 52
Discharge: HOME | End: 2022-10-16
Payer: MEDICARE

## 2022-10-14 ENCOUNTER — HOSPITAL ENCOUNTER (OUTPATIENT)
Age: 52
Discharge: HOME | End: 2022-10-16
Payer: MEDICARE

## 2022-10-14 DIAGNOSIS — G89.29 CHRONIC PAIN OF BOTH KNEES: ICD-10-CM

## 2022-10-14 DIAGNOSIS — M25.561 CHRONIC PAIN OF BOTH KNEES: ICD-10-CM

## 2022-10-14 DIAGNOSIS — M25.562 CHRONIC PAIN OF BOTH KNEES: ICD-10-CM

## 2022-10-14 PROCEDURE — 73562 X-RAY EXAM OF KNEE 3: CPT

## 2022-11-30 ENCOUNTER — HOSPITAL ENCOUNTER (OUTPATIENT)
Age: 52
Setting detail: SPECIMEN
Discharge: HOME | End: 2022-11-30

## 2022-12-01 LAB
C TRACH DNA GENITAL QL NAA+PROBE: NEGATIVE
CANDIDA SPECIES, DNA PROBE: POSITIVE
GARDNERELLA VAGINALIS, DNA PROBE: NEGATIVE
N. GONORRHOEAE DNA: NEGATIVE
SOURCE: ABNORMAL
SPECIMEN DESCRIPTION: NORMAL
TRICHOMONAS VAGINALIS DNA: NEGATIVE

## 2022-12-03 LAB
HPV SAMPLE: NORMAL
HPV, GENOTYPE 16: NOT DETECTED
HPV, GENOTYPE 18: NOT DETECTED
HPV, HIGH RISK OTHER: NOT DETECTED
HPV, INTERPRETATION: NORMAL
SPECIMEN DESCRIPTION: NORMAL

## 2022-12-14 ENCOUNTER — HOSPITAL ENCOUNTER (OUTPATIENT)
Dept: MAMMOGRAPHY | Age: 52
Discharge: HOME | End: 2022-12-16
Payer: MEDICARE

## 2022-12-14 DIAGNOSIS — Z12.31 ENCOUNTER FOR SCREENING MAMMOGRAM FOR BREAST CANCER: ICD-10-CM

## 2022-12-14 PROCEDURE — 77067 SCR MAMMO BI INCL CAD: CPT

## 2023-02-06 ENCOUNTER — HOSPITAL ENCOUNTER (EMERGENCY)
Age: 53
Discharge: HOME | End: 2023-02-07
Attending: EMERGENCY MEDICINE
Payer: MEDICARE

## 2023-02-06 ENCOUNTER — APPOINTMENT (OUTPATIENT)
Dept: CT IMAGING | Age: 53
End: 2023-02-06
Payer: MEDICARE
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
## 2023-02-06 DIAGNOSIS — F10.920 ACUTE ALCOHOLIC INTOXICATION WITHOUT COMPLICATION (HCC): ICD-10-CM

## 2023-02-06 LAB
ABSOLUTE EOS #: 0.06 K/UL (ref 0–0.44)
ABSOLUTE IMMATURE GRANULOCYTE: <0.03 K/UL (ref 0–0.3)
ABSOLUTE LYMPH #: 1.66 K/UL (ref 1.1–3.7)
ABSOLUTE MONO #: 0.19 K/UL (ref 0.1–1.2)
ANION GAP SERPL CALCULATED.3IONS-SCNC: 14 MMOL/L (ref 9–17)
BASOPHILS # BLD: 1 % (ref 0–2)
BASOPHILS ABSOLUTE: 0.04 K/UL (ref 0–0.2)
BUN SERPL-MCNC: 7 MG/DL (ref 6–20)
CALCIUM SERPL-MCNC: 9.3 MG/DL (ref 8.6–10.4)
CHLORIDE SERPL-SCNC: 106 MMOL/L (ref 98–107)
CO2 SERPL-SCNC: 21 MMOL/L (ref 20–31)
CREAT SERPL-MCNC: 0.72 MG/DL (ref 0.5–0.9)
EOSINOPHILS RELATIVE PERCENT: 2 % (ref 1–4)
ETHANOL PERCENT: 0.32 %
ETHANOL: 321 MG/DL
GFR SERPL CREATININE-BSD FRML MDRD: >60 ML/MIN/1.73M2
GLUCOSE SERPL-MCNC: 93 MG/DL (ref 70–99)
HCT VFR BLD AUTO: 45.8 % (ref 36.3–47.1)
HGB BLD-MCNC: 15.3 G/DL (ref 11.9–15.1)
IMMATURE GRANULOCYTES: 0 %
LYMPHOCYTES # BLD: 47 % (ref 24–43)
MAGNESIUM SERPL-MCNC: 2 MG/DL (ref 1.6–2.6)
MCH RBC QN AUTO: 32.6 PG (ref 25.2–33.5)
MCHC RBC AUTO-ENTMCNC: 33.4 G/DL (ref 28.4–34.8)
MCV RBC AUTO: 97.7 FL (ref 82.6–102.9)
MONOCYTES # BLD: 5 % (ref 3–12)
NRBC AUTOMATED: 0 PER 100 WBC
PDW BLD-RTO: 13 % (ref 11.8–14.4)
PLATELET # BLD AUTO: 259 K/UL (ref 138–453)
PMV BLD AUTO: 9.1 FL (ref 8.1–13.5)
POTASSIUM SERPL-SCNC: 3.5 MMOL/L (ref 3.7–5.3)
RBC # BLD: 4.69 M/UL (ref 3.95–5.11)
SEG NEUTROPHILS: 45 % (ref 36–65)
SEGMENTED NEUTROPHILS ABSOLUTE COUNT: 1.58 K/UL (ref 1.5–8.1)
SODIUM SERPL-SCNC: 141 MMOL/L (ref 135–144)
WBC # BLD AUTO: 3.5 K/UL (ref 3.5–11.3)

## 2023-02-06 PROCEDURE — 85025 COMPLETE CBC W/AUTO DIFF WBC: CPT

## 2023-02-06 PROCEDURE — 83735 ASSAY OF MAGNESIUM: CPT

## 2023-02-06 PROCEDURE — 99284 EMERGENCY DEPT VISIT MOD MDM: CPT

## 2023-02-06 PROCEDURE — 80048 BASIC METABOLIC PNL TOTAL CA: CPT

## 2023-02-06 PROCEDURE — 70450 CT HEAD/BRAIN W/O DYE: CPT

## 2023-02-06 PROCEDURE — 96365 THER/PROPH/DIAG IV INF INIT: CPT

## 2023-02-06 PROCEDURE — 96375 TX/PRO/DX INJ NEW DRUG ADDON: CPT

## 2023-02-06 PROCEDURE — G0480 DRUG TEST DEF 1-7 CLASSES: HCPCS

## 2023-02-06 PROCEDURE — 6360000002 HC RX W HCPCS: Performed by: STUDENT IN AN ORGANIZED HEALTH CARE EDUCATION/TRAINING PROGRAM

## 2023-02-06 PROCEDURE — 2500000003 HC RX 250 WO HCPCS: Performed by: STUDENT IN AN ORGANIZED HEALTH CARE EDUCATION/TRAINING PROGRAM

## 2023-02-06 PROCEDURE — 72125 CT NECK SPINE W/O DYE: CPT

## 2023-02-06 PROCEDURE — 2580000003 HC RX 258: Performed by: STUDENT IN AN ORGANIZED HEALTH CARE EDUCATION/TRAINING PROGRAM

## 2023-02-06 RX ORDER — 0.9 % SODIUM CHLORIDE 0.9 %
1000 INTRAVENOUS SOLUTION INTRAVENOUS ONCE
Status: COMPLETED | OUTPATIENT
Start: 2023-02-06 | End: 2023-02-06

## 2023-02-06 RX ORDER — LORAZEPAM 2 MG/ML
2 INJECTION INTRAMUSCULAR ONCE
Status: COMPLETED | OUTPATIENT
Start: 2023-02-06 | End: 2023-02-06

## 2023-02-06 RX ADMIN — LORAZEPAM 2 MG: 2 INJECTION INTRAMUSCULAR; INTRAVENOUS at 17:28

## 2023-02-06 RX ADMIN — SODIUM CHLORIDE 1000 ML: 9 INJECTION, SOLUTION INTRAVENOUS at 17:30

## 2023-02-06 RX ADMIN — FOLIC ACID: 5 INJECTION, SOLUTION INTRAMUSCULAR; INTRAVENOUS; SUBCUTANEOUS at 18:47

## 2023-02-06 ASSESSMENT — PAIN DESCRIPTION - PAIN TYPE: TYPE: ACUTE PAIN

## 2023-02-06 ASSESSMENT — PAIN DESCRIPTION - FREQUENCY: FREQUENCY: INTERMITTENT

## 2023-02-06 ASSESSMENT — PAIN DESCRIPTION - LOCATION: LOCATION: FACE

## 2023-02-06 ASSESSMENT — PAIN - FUNCTIONAL ASSESSMENT: PAIN_FUNCTIONAL_ASSESSMENT: 0-10

## 2023-02-06 ASSESSMENT — PAIN SCALES - GENERAL: PAINLEVEL_OUTOF10: 7
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
## 2023-02-07 VITALS
HEART RATE: 86 BPM | RESPIRATION RATE: 22 BRPM | WEIGHT: 175 LBS | OXYGEN SATURATION: 97 % | BODY MASS INDEX: 32.2 KG/M2 | SYSTOLIC BLOOD PRESSURE: 116 MMHG | TEMPERATURE: 97.8 F | HEIGHT: 62 IN | DIASTOLIC BLOOD PRESSURE: 82 MMHG

## 2023-02-07 PROCEDURE — 2580000003 HC RX 258: Performed by: STUDENT IN AN ORGANIZED HEALTH CARE EDUCATION/TRAINING PROGRAM

## 2023-02-07 RX ORDER — 0.9 % SODIUM CHLORIDE 0.9 %
1000 INTRAVENOUS SOLUTION INTRAVENOUS ONCE
Status: COMPLETED | OUTPATIENT
Start: 2023-02-07 | End: 2023-02-07

## 2023-02-07 RX ADMIN — SODIUM CHLORIDE 1000 ML: 9 INJECTION, SOLUTION INTRAVENOUS at 01:24

## 2023-02-07 NOTE — ED NOTES
IRENE arranged transport for discharged pt back to address on file. [facility] insurance utilized. Pt provided phone number of [phone]. ETA 1-3 hours.  DANILO Painter confirmed pt was discharged Trip # [address], [facility]  02/07/23 Francia Clarke 1640, W  02/07/23 7067

## 2023-02-07 NOTE — ED NOTES
Pt again comes to the nurses desk wanting to go outside to smoke and leave thereafter.       Júnior Diallo RN  02/07/23 2382

## 2023-02-07 NOTE — DISCHARGE INSTRUCTIONS
Please refrain from drinking a lot of alcoholic drinks to prevent from falling. If it anytime you have headache, develop blurry vision, fever, chills, nausea and vomiting, chest pain or shortness of breath, return to emergency room as soon as possible. CT HEAD WO CONTRAST   Final Result   No acute intracranial abnormality. Senescent changes including chronic   microvascular change. CT CERVICAL SPINE WO CONTRAST   Final Result   No acute abnormality of the cervical spine. Degenerative changes as above.

## 2023-02-07 NOTE — ED PROVIDER NOTES
Faculty Sign-Out Attestation  Handoff taken on the following patient from prior Attending Physician: Silvia Plummer    I was available and discussed any additional care issues that arose and coordinated the management plans with the resident(s) caring for the patient during my duty period. Any areas of disagreement with residents documentation of care or procedures are noted on the chart. I was personally present for the key portions of any/all procedures during my duty period. I have documented in the chart those procedures where I was not present during the key portions.     Intoxicated, ct head -, ct neck -, needing sober recheck at 0300,   If stable, will discharge    Prashanth Merino DO  Attending Physician       Prashanth Merino,   02/07/23 0015    - talkative, ambulatory with steady even gait, tolerating liquids,   Clinically sober     Prashanth Merino DO  02/07/23 1206

## 2023-02-07 NOTE — ED PROVIDER NOTES
[facility] [address] [facility]  Emergency Department  Emergency Medicine Resident Sign-out     Care of Luis Cooper was assumed from Dr. Dakota Mishra and is being seen for Fall, Alcohol Intoxication, and Facial Injury  . The patient's initial evaluation and plan have been discussed with the prior provider who initially evaluated the patient. EMERGENCY DEPARTMENT COURSE / MEDICAL DECISION MAKING:       MEDICATIONS GIVEN:  Orders Placed This Encounter   Medications    0.9 % sodium chloride bolus    folic acid 1 mg, thiamine (B-1) 100 mg in sodium chloride 0.9 % 50 mL IVPB    LORazepam (ATIVAN) injection 2 mg    0.9 % sodium chloride bolus       LABS / RADIOLOGY:     Labs Reviewed   CBC WITH AUTO DIFFERENTIAL - Abnormal; Notable for the following components:       Result Value    Hemoglobin 15.3 (*)     Lymphocytes 47 (*)     All other components within normal limits   BASIC METABOLIC PANEL - Abnormal; Notable for the following components:    Potassium 3.5 (*)     All other components within normal limits   ETHANOL - Abnormal; Notable for the following components:    Ethanol 321 (*)     Ethanol percent 0.321 (*)     All other components within normal limits   MAGNESIUM       CT HEAD WO CONTRAST    Result Date: 2/6/2023  EXAMINATION: CT OF THE HEAD WITHOUT CONTRAST  2/6/2023 5:16 pm TECHNIQUE: CT of the head was performed without the administration of intravenous contrast. Automated exposure control, iterative reconstruction, and/or weight based adjustment of the mA/kV was utilized to reduce the radiation dose to as low as reasonably achievable. COMPARISON: None.  HISTORY: ORDERING SYSTEM PROVIDED HISTORY: Left-sided hemotympanums, multiple falls TECHNOLOGIST PROVIDED HISTORY: Left-sided hemotympanums, multiple falls Decision Support Exception - unselect if not a suspected or confirmed emergency medical condition->Emergency Medical Condition (MA) Is the patient pregnant?->No Reason for Exam: Left-sided hemotympanums, multiple falls FINDINGS: BRAIN/VENTRICLES: There is no acute intracranial hemorrhage, mass effect or midline shift. No abnormal extra-axial fluid collection. The gray-white differentiation is maintained without evidence of an acute infarct. There is no evidence of hydrocephalus. Scattered hypodensity in the white matter is present consistent with chronic microvascular change. ORBITS: The visualized portion of the orbits demonstrate no acute abnormality. SINUSES: Mild mucoperiosteal thickening is noted in the ethmoid air cells. Other paranasal sinuses are well aerated. Mastoid air cells are adequately aerated. SOFT TISSUES/SKULL:  No acute abnormality of the visualized skull or soft tissues. No acute intracranial abnormality. Senescent changes including chronic microvascular change. CT CERVICAL SPINE WO CONTRAST    Result Date: 2/6/2023  EXAMINATION: CT OF THE CERVICAL SPINE WITHOUT CONTRAST 2/6/2023 5:16 pm TECHNIQUE: CT of the cervical spine was performed without the administration of intravenous contrast. Multiplanar reformatted images are provided for review. Automated exposure control, iterative reconstruction, and/or weight based adjustment of the mA/kV was utilized to reduce the radiation dose to as low as reasonably achievable. COMPARISON: None. HISTORY: ORDERING SYSTEM PROVIDED HISTORY: Intoxicated, multiple falls, hemotympanum on the left TECHNOLOGIST PROVIDED HISTORY: Intoxicated, multiple falls, hemotympanum on the left Decision Support Exception - unselect if not a suspected or confirmed emergency medical condition->Emergency Medical Condition (MA) Is the patient pregnant?->No Reason for Exam: Left-sided hemotympanums, multiple falls FINDINGS: BONES/ALIGNMENT: There is no acute fracture or traumatic malalignment. A mild levo scoliotic curvature is noted. DEGENERATIVE CHANGES: Mild degenerative and degenerative disc changes are noted most significant C4 through C6.   Patient has moderate right neural foraminal C4-C5 and C5-C6. The remainder than are foramina are patent. No gross bony canal stenosis is noted. SOFT TISSUES: There is no prevertebral soft tissue swelling. No acute abnormality of the cervical spine. Degenerative changes as above. RECENT VITALS:     Temp: 97.8 °F (36.6 °C),  Heart Rate: 86, Resp: 22, BP: 82/72, SpO2: 97 %      This patient is a 45 y.o. Female with fall. Pt intoxicated. CT head and CT cervical spine negative. EtOH is 321  Sober time at [employer] need re-evaluation for Tert      ED Course as of 02/07/23 0324   Mon Feb 06, 2023 1954 Patient severely intoxicated. No brain bleed or signs of basal skull fracture. Sober time expected to be near at 3:15 AM [ES]   Tue Feb 07, 2023   7784 Patient reassessed after sober time. She is up, walking, in no acute distress. Ambulated to the bathroom without any gait abnormality. Patient reports she does not have any complaints of pain anywhere. At this moment we will discharge patient. She will work to get a cab ride for patient. [AN]      ED Course User Index  [AN] Antonette Joe MD  [ES] Thom Moore MD       OUTSTANDING TASKS / RECOMMENDATIONS:    Sober time  Reassesses      FINAL IMPRESSION:     1. Fall, initial encounter    2.  Acute alcoholic intoxication without complication [facility])        DISPOSITION:         DISPOSITION:  [x]  Discharge   []  Transfer -    []  Admission -     []  Against Medical Advice   []  Eloped   FOLLOW-UP: [facility]  [address] [place] [address]  [phone]    If symptoms worsen    Minesh Valentin MD  [address]  [phone]      As needed   DISCHARGE MEDICATIONS: New Prescriptions    No medications on file          Antonette Joe MD  Emergency Medicine Resident  [address] [facility]        Antonette Joe MD  Resident  02/07/23 2190

## 2023-02-07 NOTE — ED NOTES
Report to Lacey Hair with his understanding of the patients needs and concerns      Cortez [place] RN  02/06/23 3946

## 2023-02-07 NOTE — PROGRESS NOTES
[address] [facility] [address]  PROGRESS NOTE    Shift date: 2.6.2023  Shift day: Monday   Shift # 2    Room # 01/01   Name: Jean Pak                Jehovah's witness: unknown   Place of Muslim: unknown    Referral: Routine Visit    Admit Date & Time: 2/6/2023  5:08 PM    Assessment:  Jean Pak is a 45 y.o. female in the hospital and reportedly intoxicated. Upon entering the room writer observes patient appearing agitated and arguing with medical staff.  informed that the patient is intoxicated at this time.  expressed willingness to assist if needed. Intervention:  Writer introduced self and title as . Maintained presence and assessed the situation. Outcome:  Chaplains will remain available to offer spiritual and emotional support as needed. Plan:  Chaplains will remain available to offer spiritual and emotional support as needed.       Electronically signed by Toribio Hayes on 2/7/2023 at [address] [place] [phone]       02/06/23 1845   Encounter Summary   Service Provided For: Patient   Referral/Consult From: [address] [place] [address]   Last Encounter  02/06/23   Complexity of Encounter Low   Begin Time 1845   End Time  1850   Total Time Calculated 5 min   Encounter    Type Initial Screen/Assessment   Assessment/Intervention/Outcome   Assessment Angry   Intervention Active listening;Sustaining Presence/Ministry of presence   Outcome Venting emotion     Electronically signed by Merced Raphael on 2/7/2023 at 1:53 AM

## 2023-02-07 NOTE — ED NOTES
Attempted to find contact information for patient's son or daughter due to concern that patient's 12 yr old son is home alone. All contact numbers are incorrect. LISA and RN attempted to wake patient and unsuccessful. Patient did not have any contact information on her person. SW will continue to look for contact information.      LISA Alba  02/06/23 1910

## 2023-02-07 NOTE — PROGRESS NOTES
[address] [facility] [address]  PROGRESS NOTE    Shift date: 2/7/2023  Shift day: Monday   Shift # 3    Room # 01/01   Name: Chance Warner                Spiritism:    Place of Methodist:     Referral: Routine Visit    Admit Date & Time: 2/6/2023  5:08 PM    Assessment:  Chance Warner is a 45 y.o. female in the hospital because of a FALL. [place] Upon entering the room writer observes patient preparing to get dressed. Intervention:  Writer introduced self and title as . Patient requested prayer. Outcome:  Patient expressed gratitude. Plan:  Chaplains will remain available to offer spiritual and emotional support as needed.       Electronically signed by Poppy Branch on 2/7/2023 at 2:44 AM.  [facility]  [phone]   02/07/23 5093   Encounter Summary   Service Provided For: Patient   Referral/Consult From: [facility]   Last Encounter  02/07/23   Complexity of Encounter Moderate   Begin Time 0238   End Time  0248   Total Time Calculated 10 min   Encounter    Type Follow up   Assessment/Intervention/Outcome   Assessment Compromised coping   Intervention Prayer (assurance of)/[place]   Outcome Expressed Gratitude

## 2023-02-07 NOTE — ED NOTES
Patient was changed out of wet linen,   Warm blankets given  Pt is Alert, talking in clear sentences, then went rigth back to sleep; writer tried getting the son phone number,  Pt resting in bed, NAD noted rr even and non labored. Bed locked in lowest position, environment free of clutter. Call light within reach, will continue to monitor.         Colt Posada RN  02/06/23 7120

## 2023-02-07 NOTE — ED NOTES
Pt is awake and talking, alert & oriented x 4 now, amnestic to event leading up to her being in the ED. Pt provided phone number for staff to reach her teenage son if needed. Corey Fowler [phone].         Jade Rasheed RN  02/07/23 4156

## 2023-02-07 NOTE — ED NOTES
Pt agitated, wants to go outside and smoke, requesting to leave. Pt still amnestic to event and prior conversations discussing her being here.       Daniel Coronel RN  02/07/23 8733

## 2023-02-08 ENCOUNTER — CARE COORDINATION (OUTPATIENT)
Dept: CARE COORDINATION | Age: 53
End: 2023-02-08

## 2023-02-08 NOTE — CARE COORDINATION
Ambulatory Care Coordination  ED Follow up Call    Reason for ED visit:  STVZ 2/6/2023  Chief Complaint   Patient presents with    Fall    Alcohol Intoxication    Facial Injury     FINAL IMPRESSION       1. Fall, initial encounter    2. Acute alcoholic intoxication without complication ([facility] [address]         FU appts/Provider:    No future appointments. LOV: 10/9/2022    New Medications?:   No       Attempted to contact patient for ED follow up, phone unavailable. Will try again tomorrow. 2/9/2023 -  Attempted to contact patient for follow up ED and home phone unavialbe, tried mobile number and no answer and unable to lvm since mailbox is full. No further outreach calls per ACM. LOV: 10/9/2020    Per notes looks like patient has been going to [facility], since 10/13/2022 and last visit was 1/5/2023.

## 2023-10-21 ENCOUNTER — APPOINTMENT (OUTPATIENT)
Dept: CT IMAGING | Age: 53
DRG: 101 | End: 2023-10-21
Payer: MEDICARE

## 2023-10-21 ENCOUNTER — HOSPITAL ENCOUNTER (INPATIENT)
Age: 53
LOS: 4 days | Discharge: HOME | DRG: 101 | End: 2023-10-25
Attending: EMERGENCY MEDICINE | Admitting: INTERNAL MEDICINE
Payer: MEDICARE

## 2023-10-21 ENCOUNTER — APPOINTMENT (OUTPATIENT)
Dept: GENERAL RADIOLOGY | Age: 53
DRG: 101 | End: 2023-10-21
Payer: MEDICARE

## 2023-10-21 DIAGNOSIS — F10.930 ALCOHOL WITHDRAWAL SYNDROME WITHOUT COMPLICATION (HCC): ICD-10-CM

## 2023-10-21 DIAGNOSIS — R56.9 SEIZURE (HCC): Primary | ICD-10-CM

## 2023-10-21 PROBLEM — G40.409: Status: ACTIVE | Noted: 2023-10-21

## 2023-10-21 PROBLEM — R41.82 ALTERED MENTAL STATUS: Status: ACTIVE | Noted: 2023-10-21

## 2023-10-21 PROBLEM — E87.6 HYPOKALEMIA: Status: ACTIVE | Noted: 2023-10-21

## 2023-10-21 PROBLEM — F10.10 ALCOHOL ABUSE: Status: ACTIVE | Noted: 2023-10-21

## 2023-10-21 PROBLEM — F10.139 ALCOHOL ABUSE WITH WITHDRAWAL (HCC): Status: ACTIVE | Noted: 2023-10-21

## 2023-10-21 PROBLEM — R41.0 DISORIENTATION: Status: ACTIVE | Noted: 2023-10-21

## 2023-10-21 PROBLEM — E87.20 METABOLIC ACIDOSIS: Status: ACTIVE | Noted: 2023-10-21

## 2023-10-21 LAB
ALBUMIN SERPL-MCNC: 4.6 G/DL (ref 3.5–5.2)
ALBUMIN/GLOB SERPL: 1.2 {RATIO} (ref 1–2.5)
ALP SERPL-CCNC: 95 U/L (ref 35–104)
ALT SERPL-CCNC: 29 U/L (ref 5–33)
AMPHET UR QL SCN: NEGATIVE
ANION GAP SERPL CALCULATED.3IONS-SCNC: 24 MMOL/L (ref 9–17)
APAP SERPL-MCNC: <5 UG/ML (ref 10–30)
AST SERPL-CCNC: 83 U/L
BACTERIA URNS QL MICRO: NORMAL
BARBITURATES UR QL SCN: NEGATIVE
BASOPHILS # BLD: 0.03 K/UL (ref 0–0.2)
BASOPHILS NFR BLD: 1 % (ref 0–2)
BENZODIAZ UR QL: NEGATIVE
BILIRUB SERPL-MCNC: 0.9 MG/DL (ref 0.3–1.2)
BILIRUB UR QL STRIP: NEGATIVE
BUN BLD-MCNC: 8 MG/DL (ref 8–26)
BUN SERPL-MCNC: 9 MG/DL (ref 6–20)
CA-I BLD-SCNC: 1.11 MMOL/L (ref 1.15–1.33)
CALCIUM SERPL-MCNC: 9.4 MG/DL (ref 8.6–10.4)
CANNABINOIDS UR QL SCN: NEGATIVE
CASTS #/AREA URNS LPF: NORMAL /LPF (ref 0–8)
CHLORIDE BLD-SCNC: 103 MMOL/L (ref 98–107)
CHLORIDE SERPL-SCNC: 95 MMOL/L (ref 98–107)
CHP ED QC CHECK: NORMAL
CLARITY UR: CLEAR
CO2 BLD CALC-SCNC: 21 MMOL/L (ref 22–30)
CO2 SERPL-SCNC: 18 MMOL/L (ref 20–31)
COCAINE UR QL SCN: NEGATIVE
COLOR UR: YELLOW
CREAT SERPL-MCNC: 0.8 MG/DL (ref 0.5–0.9)
EGFR, POC: >60 ML/MIN/1.73M2
EOSINOPHIL # BLD: <0.03 K/UL (ref 0–0.44)
EOSINOPHILS RELATIVE PERCENT: 0 % (ref 1–4)
EPI CELLS #/AREA URNS HPF: NORMAL /HPF (ref 0–5)
ERYTHROCYTE [DISTWIDTH] IN BLOOD BY AUTOMATED COUNT: 13.8 % (ref 11.8–14.4)
ETHANOL PERCENT: <0.01 %
ETHANOLAMINE SERPL-MCNC: <10 MG/DL
FENTANYL UR QL: NEGATIVE
GFR SERPL CREATININE-BSD FRML MDRD: >60 ML/MIN/1.73M2
GLUCOSE BLD-MCNC: 89 MG/DL
GLUCOSE BLD-MCNC: 89 MG/DL (ref 74–100)
GLUCOSE SERPL-MCNC: 87 MG/DL (ref 70–99)
GLUCOSE UR STRIP-MCNC: NEGATIVE MG/DL
HCO3 VENOUS: 21.5 MMOL/L (ref 22–29)
HCT VFR BLD AUTO: 45.9 % (ref 36.3–47.1)
HCT VFR BLD AUTO: 50 % (ref 36–46)
HGB BLD-MCNC: 15.8 G/DL (ref 11.9–15.1)
HGB UR QL STRIP.AUTO: ABNORMAL
IMM GRANULOCYTES # BLD AUTO: 0.05 K/UL (ref 0–0.3)
IMM GRANULOCYTES NFR BLD: 1 %
INR PPP: 1
KETONES UR STRIP-MCNC: ABNORMAL MG/DL
LACTIC ACID, WHOLE BLOOD: 1.3 MMOL/L (ref 0.7–2.1)
LACTIC ACID, WHOLE BLOOD: 7.2 MMOL/L (ref 0.7–2.1)
LEUKOCYTE ESTERASE UR QL STRIP: NEGATIVE
LYMPHOCYTES NFR BLD: 1 K/UL (ref 1.1–3.7)
LYMPHOCYTES RELATIVE PERCENT: 21 % (ref 24–43)
MAGNESIUM SERPL-MCNC: 1.8 MG/DL (ref 1.6–2.6)
MCH RBC QN AUTO: 33.8 PG (ref 25.2–33.5)
MCHC RBC AUTO-ENTMCNC: 34.4 G/DL (ref 28.4–34.8)
MCV RBC AUTO: 98.1 FL (ref 82.6–102.9)
METHADONE UR QL: NEGATIVE
MONOCYTES NFR BLD: 0.35 K/UL (ref 0.1–1.2)
MONOCYTES NFR BLD: 7 % (ref 3–12)
NEGATIVE BASE EXCESS, VEN: 1.1 MMOL/L (ref 0–2)
NEUTROPHILS NFR BLD: 70 % (ref 36–65)
NEUTS SEG NFR BLD: 3.34 K/UL (ref 1.5–8.1)
NITRITE UR QL STRIP: NEGATIVE
NRBC BLD-RTO: 0 PER 100 WBC
O2 SAT, VEN: 85.1 % (ref 60–85)
OPIATES UR QL SCN: NEGATIVE
OSMOLALITY SERPL: 283 MOSM/KG (ref 275–295)
OXYCODONE UR QL SCN: NEGATIVE
PARTIAL THROMBOPLASTIN TIME: 23.2 SEC (ref 23–36.5)
PCO2, VEN: 30.5 MM HG (ref 41–51)
PCP UR QL SCN: NEGATIVE
PH UR STRIP: 6 [PH] (ref 5–8)
PH VENOUS: 7.46 (ref 7.32–7.43)
PLATELET # BLD AUTO: 234 K/UL (ref 138–453)
PMV BLD AUTO: 8.8 FL (ref 8.1–13.5)
PO2, VEN: 46.5 MM HG (ref 30–50)
POC ANION GAP: 19 MMOL/L (ref 7–16)
POC CREATININE: 0.8 MG/DL (ref 0.51–1.19)
POC HEMOGLOBIN (CALC): 17 G/DL (ref 12–16)
POC LACTIC ACID: 11.4 MMOL/L (ref 0.56–1.39)
POTASSIUM BLD-SCNC: 3.6 MMOL/L (ref 3.5–4.5)
POTASSIUM SERPL-SCNC: 3.2 MMOL/L (ref 3.7–5.3)
PROT SERPL-MCNC: 8.5 G/DL (ref 6.4–8.3)
PROT UR STRIP-MCNC: ABNORMAL MG/DL
PROTHROMBIN TIME: 13.2 SEC (ref 11.7–14.9)
RBC # BLD AUTO: 4.68 M/UL (ref 3.95–5.11)
RBC #/AREA URNS HPF: NORMAL /HPF (ref 0–4)
SALICYLATES SERPL-MCNC: <1 MG/DL (ref 3–10)
SODIUM BLD-SCNC: 142 MMOL/L (ref 138–146)
SODIUM SERPL-SCNC: 137 MMOL/L (ref 135–144)
SP GR UR STRIP: 1.02 (ref 1–1.03)
TEST INFORMATION: NORMAL
TOXIC TRICYCLIC SC,BLOOD: NEGATIVE
TROPONIN I SERPL HS-MCNC: 7 NG/L (ref 0–14)
TROPONIN I SERPL HS-MCNC: 9 NG/L (ref 0–14)
TSH SERPL DL<=0.05 MIU/L-ACNC: 4.74 UIU/ML (ref 0.3–5)
UROBILINOGEN UR STRIP-ACNC: NORMAL EU/DL (ref 0–1)
WBC #/AREA URNS HPF: NORMAL /HPF (ref 0–5)
WBC OTHER # BLD: 4.8 K/UL (ref 3.5–11.3)

## 2023-10-21 PROCEDURE — 70450 CT HEAD/BRAIN W/O DYE: CPT

## 2023-10-21 PROCEDURE — 82140 ASSAY OF AMMONIA: CPT

## 2023-10-21 PROCEDURE — 6370000000 HC RX 637 (ALT 250 FOR IP)

## 2023-10-21 PROCEDURE — 2580000003 HC RX 258

## 2023-10-21 PROCEDURE — 6360000002 HC RX W HCPCS

## 2023-10-21 PROCEDURE — 82565 ASSAY OF CREATININE: CPT

## 2023-10-21 PROCEDURE — 85610 PROTHROMBIN TIME: CPT

## 2023-10-21 PROCEDURE — 85730 THROMBOPLASTIN TIME PARTIAL: CPT

## 2023-10-21 PROCEDURE — 6360000004 HC RX CONTRAST MEDICATION: Performed by: STUDENT IN AN ORGANIZED HEALTH CARE EDUCATION/TRAINING PROGRAM

## 2023-10-21 PROCEDURE — 6360000002 HC RX W HCPCS: Performed by: STUDENT IN AN ORGANIZED HEALTH CARE EDUCATION/TRAINING PROGRAM

## 2023-10-21 PROCEDURE — 82330 ASSAY OF CALCIUM: CPT

## 2023-10-21 PROCEDURE — 99285 EMERGENCY DEPT VISIT HI MDM: CPT

## 2023-10-21 PROCEDURE — 70496 CT ANGIOGRAPHY HEAD: CPT

## 2023-10-21 PROCEDURE — 83735 ASSAY OF MAGNESIUM: CPT

## 2023-10-21 PROCEDURE — 82746 ASSAY OF FOLIC ACID SERUM: CPT

## 2023-10-21 PROCEDURE — 83930 ASSAY OF BLOOD OSMOLALITY: CPT

## 2023-10-21 PROCEDURE — 93005 ELECTROCARDIOGRAM TRACING: CPT | Performed by: STUDENT IN AN ORGANIZED HEALTH CARE EDUCATION/TRAINING PROGRAM

## 2023-10-21 PROCEDURE — 85014 HEMATOCRIT: CPT

## 2023-10-21 PROCEDURE — 2500000003 HC RX 250 WO HCPCS: Performed by: STUDENT IN AN ORGANIZED HEALTH CARE EDUCATION/TRAINING PROGRAM

## 2023-10-21 PROCEDURE — 36415 COLL VENOUS BLD VENIPUNCTURE: CPT

## 2023-10-21 PROCEDURE — 2060000000 HC ICU INTERMEDIATE R&B

## 2023-10-21 PROCEDURE — 84484 ASSAY OF TROPONIN QUANT: CPT

## 2023-10-21 PROCEDURE — 82947 ASSAY GLUCOSE BLOOD QUANT: CPT

## 2023-10-21 PROCEDURE — 71045 X-RAY EXAM CHEST 1 VIEW: CPT

## 2023-10-21 PROCEDURE — 80307 DRUG TEST PRSMV CHEM ANLYZR: CPT

## 2023-10-21 PROCEDURE — 80051 ELECTROLYTE PANEL: CPT

## 2023-10-21 PROCEDURE — 84520 ASSAY OF UREA NITROGEN: CPT

## 2023-10-21 PROCEDURE — 81001 URINALYSIS AUTO W/SCOPE: CPT

## 2023-10-21 PROCEDURE — 82607 VITAMIN B-12: CPT

## 2023-10-21 PROCEDURE — 80179 DRUG ASSAY SALICYLATE: CPT

## 2023-10-21 PROCEDURE — 80053 COMPREHEN METABOLIC PANEL: CPT

## 2023-10-21 PROCEDURE — 84443 ASSAY THYROID STIM HORMONE: CPT

## 2023-10-21 PROCEDURE — 96365 THER/PROPH/DIAG IV INF INIT: CPT

## 2023-10-21 PROCEDURE — 2580000003 HC RX 258: Performed by: STUDENT IN AN ORGANIZED HEALTH CARE EDUCATION/TRAINING PROGRAM

## 2023-10-21 PROCEDURE — 82803 BLOOD GASES ANY COMBINATION: CPT

## 2023-10-21 PROCEDURE — 85025 COMPLETE CBC W/AUTO DIFF WBC: CPT

## 2023-10-21 PROCEDURE — 83605 ASSAY OF LACTIC ACID: CPT

## 2023-10-21 PROCEDURE — G0480 DRUG TEST DEF 1-7 CLASSES: HCPCS

## 2023-10-21 PROCEDURE — 80143 DRUG ASSAY ACETAMINOPHEN: CPT

## 2023-10-21 PROCEDURE — 87040 BLOOD CULTURE FOR BACTERIA: CPT

## 2023-10-21 RX ORDER — LISINOPRIL 20 MG/1
20 TABLET ORAL DAILY
Status: DISCONTINUED | OUTPATIENT
Start: 2023-10-21 | End: 2023-10-25 | Stop reason: HOSPADM

## 2023-10-21 RX ORDER — SODIUM CHLORIDE, SODIUM LACTATE, POTASSIUM CHLORIDE, CALCIUM CHLORIDE 600; 310; 30; 20 MG/100ML; MG/100ML; MG/100ML; MG/100ML
INJECTION, SOLUTION INTRAVENOUS CONTINUOUS
Status: DISCONTINUED | OUTPATIENT
Start: 2023-10-22 | End: 2023-10-21

## 2023-10-21 RX ORDER — MAGNESIUM SULFATE IN WATER 40 MG/ML
2000 INJECTION, SOLUTION INTRAVENOUS ONCE
Status: COMPLETED | OUTPATIENT
Start: 2023-10-21 | End: 2023-10-21

## 2023-10-21 RX ORDER — ONDANSETRON 4 MG/1
4 TABLET, ORALLY DISINTEGRATING ORAL EVERY 8 HOURS PRN
Status: DISCONTINUED | OUTPATIENT
Start: 2023-10-21 | End: 2023-10-25 | Stop reason: HOSPADM

## 2023-10-21 RX ORDER — SODIUM CHLORIDE 9 MG/ML
INJECTION, SOLUTION INTRAVENOUS PRN
Status: DISCONTINUED | OUTPATIENT
Start: 2023-10-21 | End: 2023-10-25 | Stop reason: HOSPADM

## 2023-10-21 RX ORDER — ENOXAPARIN SODIUM 100 MG/ML
40 INJECTION SUBCUTANEOUS DAILY
Status: DISCONTINUED | OUTPATIENT
Start: 2023-10-22 | End: 2023-10-25 | Stop reason: HOSPADM

## 2023-10-21 RX ORDER — 0.9 % SODIUM CHLORIDE 0.9 %
1000 INTRAVENOUS SOLUTION INTRAVENOUS ONCE
Status: COMPLETED | OUTPATIENT
Start: 2023-10-21 | End: 2023-10-21

## 2023-10-21 RX ORDER — ACETAMINOPHEN 650 MG/1
650 SUPPOSITORY RECTAL EVERY 6 HOURS PRN
Status: DISCONTINUED | OUTPATIENT
Start: 2023-10-21 | End: 2023-10-25 | Stop reason: HOSPADM

## 2023-10-21 RX ORDER — MAGNESIUM SULFATE IN WATER 40 MG/ML
2000 INJECTION, SOLUTION INTRAVENOUS PRN
Status: DISCONTINUED | OUTPATIENT
Start: 2023-10-21 | End: 2023-10-25 | Stop reason: HOSPADM

## 2023-10-21 RX ORDER — CALCIUM GLUCONATE 20 MG/ML
2000 INJECTION, SOLUTION INTRAVENOUS ONCE
Status: COMPLETED | OUTPATIENT
Start: 2023-10-21 | End: 2023-10-21

## 2023-10-21 RX ORDER — SODIUM CHLORIDE 9 MG/ML
INJECTION, SOLUTION INTRAVENOUS PRN
Status: CANCELLED | OUTPATIENT
Start: 2023-10-21

## 2023-10-21 RX ORDER — INSULIN LISPRO 100 [IU]/ML
0-4 INJECTION, SOLUTION INTRAVENOUS; SUBCUTANEOUS NIGHTLY
Status: DISCONTINUED | OUTPATIENT
Start: 2023-10-21 | End: 2023-10-25 | Stop reason: HOSPADM

## 2023-10-21 RX ORDER — SODIUM CHLORIDE 0.9 % (FLUSH) 0.9 %
5-40 SYRINGE (ML) INJECTION EVERY 12 HOURS SCHEDULED
Status: CANCELLED | OUTPATIENT
Start: 2023-10-21

## 2023-10-21 RX ORDER — SODIUM CHLORIDE 0.9 % (FLUSH) 0.9 %
5-40 SYRINGE (ML) INJECTION PRN
Status: DISCONTINUED | OUTPATIENT
Start: 2023-10-21 | End: 2023-10-25 | Stop reason: HOSPADM

## 2023-10-21 RX ORDER — DEXTROSE MONOHYDRATE 100 MG/ML
INJECTION, SOLUTION INTRAVENOUS CONTINUOUS PRN
Status: DISCONTINUED | OUTPATIENT
Start: 2023-10-21 | End: 2023-10-25 | Stop reason: HOSPADM

## 2023-10-21 RX ORDER — SODIUM CHLORIDE 0.9 % (FLUSH) 0.9 %
5-40 SYRINGE (ML) INJECTION EVERY 12 HOURS SCHEDULED
Status: DISCONTINUED | OUTPATIENT
Start: 2023-10-21 | End: 2023-10-25 | Stop reason: HOSPADM

## 2023-10-21 RX ORDER — INSULIN LISPRO 100 [IU]/ML
0-8 INJECTION, SOLUTION INTRAVENOUS; SUBCUTANEOUS
Status: DISCONTINUED | OUTPATIENT
Start: 2023-10-22 | End: 2023-10-25 | Stop reason: HOSPADM

## 2023-10-21 RX ORDER — SODIUM CHLORIDE 0.9 % (FLUSH) 0.9 %
5-40 SYRINGE (ML) INJECTION PRN
Status: CANCELLED | OUTPATIENT
Start: 2023-10-21

## 2023-10-21 RX ORDER — ACETAMINOPHEN 325 MG/1
650 TABLET ORAL EVERY 6 HOURS PRN
Status: DISCONTINUED | OUTPATIENT
Start: 2023-10-21 | End: 2023-10-25 | Stop reason: HOSPADM

## 2023-10-21 RX ORDER — HYDROCHLOROTHIAZIDE 25 MG/1
25 TABLET ORAL DAILY
Status: DISCONTINUED | OUTPATIENT
Start: 2023-10-21 | End: 2023-10-25 | Stop reason: HOSPADM

## 2023-10-21 RX ORDER — ONDANSETRON 2 MG/ML
4 INJECTION INTRAMUSCULAR; INTRAVENOUS EVERY 6 HOURS PRN
Status: DISCONTINUED | OUTPATIENT
Start: 2023-10-21 | End: 2023-10-25 | Stop reason: HOSPADM

## 2023-10-21 RX ORDER — POTASSIUM CHLORIDE 20 MEQ/1
40 TABLET, EXTENDED RELEASE ORAL ONCE
Status: COMPLETED | OUTPATIENT
Start: 2023-10-21 | End: 2023-10-21

## 2023-10-21 RX ORDER — SODIUM CHLORIDE, SODIUM LACTATE, POTASSIUM CHLORIDE, CALCIUM CHLORIDE 600; 310; 30; 20 MG/100ML; MG/100ML; MG/100ML; MG/100ML
INJECTION, SOLUTION INTRAVENOUS CONTINUOUS
Status: DISCONTINUED | OUTPATIENT
Start: 2023-10-21 | End: 2023-10-25 | Stop reason: HOSPADM

## 2023-10-21 RX ORDER — POLYETHYLENE GLYCOL 3350 17 G/17G
17 POWDER, FOR SOLUTION ORAL DAILY PRN
Status: DISCONTINUED | OUTPATIENT
Start: 2023-10-21 | End: 2023-10-25 | Stop reason: HOSPADM

## 2023-10-21 RX ORDER — POTASSIUM CHLORIDE 7.45 MG/ML
10 INJECTION INTRAVENOUS PRN
Status: DISCONTINUED | OUTPATIENT
Start: 2023-10-21 | End: 2023-10-25 | Stop reason: HOSPADM

## 2023-10-21 RX ORDER — PANTOPRAZOLE SODIUM 40 MG/1
40 TABLET, DELAYED RELEASE ORAL
Status: DISCONTINUED | OUTPATIENT
Start: 2023-10-22 | End: 2023-10-25 | Stop reason: HOSPADM

## 2023-10-21 RX ORDER — LANOLIN ALCOHOL/MO/W.PET/CERES
100 CREAM (GRAM) TOPICAL DAILY
Status: CANCELLED | OUTPATIENT
Start: 2023-10-22

## 2023-10-21 RX ORDER — POTASSIUM CHLORIDE 20 MEQ/1
40 TABLET, EXTENDED RELEASE ORAL PRN
Status: DISCONTINUED | OUTPATIENT
Start: 2023-10-21 | End: 2023-10-25 | Stop reason: HOSPADM

## 2023-10-21 RX ORDER — LISINOPRIL AND HYDROCHLOROTHIAZIDE 25; 20 MG/1; MG/1
1 TABLET ORAL DAILY
Status: DISCONTINUED | OUTPATIENT
Start: 2023-10-21 | End: 2023-10-21

## 2023-10-21 RX ADMIN — CALCIUM GLUCONATE 2000 MG: 20 INJECTION, SOLUTION INTRAVENOUS at 21:16

## 2023-10-21 RX ADMIN — SODIUM CHLORIDE, PRESERVATIVE FREE 10 ML: 5 INJECTION INTRAVENOUS at 23:50

## 2023-10-21 RX ADMIN — SODIUM CHLORIDE 1000 ML: 9 INJECTION, SOLUTION INTRAVENOUS at 21:23

## 2023-10-21 RX ADMIN — IOPAMIDOL 90 ML: 755 INJECTION, SOLUTION INTRAVENOUS at 19:58

## 2023-10-21 RX ADMIN — SODIUM CHLORIDE, POTASSIUM CHLORIDE, SODIUM LACTATE AND CALCIUM CHLORIDE: 600; 310; 30; 20 INJECTION, SOLUTION INTRAVENOUS at 23:44

## 2023-10-21 RX ADMIN — MAGNESIUM SULFATE HEPTAHYDRATE 2000 MG: 40 INJECTION, SOLUTION INTRAVENOUS at 21:19

## 2023-10-21 RX ADMIN — FOLIC ACID: 5 INJECTION, SOLUTION INTRAMUSCULAR; INTRAVENOUS; SUBCUTANEOUS at 18:41

## 2023-10-21 RX ADMIN — POTASSIUM CHLORIDE 40 MEQ: 1500 TABLET, EXTENDED RELEASE ORAL at 21:15

## 2023-10-21 RX ADMIN — SODIUM CHLORIDE, PRESERVATIVE FREE 6 ML: 5 INJECTION INTRAVENOUS at 20:40

## 2023-10-21 ASSESSMENT — ENCOUNTER SYMPTOMS
COUGH: 0
NAUSEA: 0
ABDOMINAL PAIN: 0
PHOTOPHOBIA: 0
TROUBLE SWALLOWING: 0
SHORTNESS OF BREATH: 0
CHEST TIGHTNESS: 0
COLOR CHANGE: 0
ABDOMINAL DISTENTION: 0
WHEEZING: 0
CONSTIPATION: 0
BACK PAIN: 0
DIARRHEA: 0
VOICE CHANGE: 0
VOMITING: 0

## 2023-10-21 ASSESSMENT — LIFESTYLE VARIABLES
HOW MANY STANDARD DRINKS CONTAINING ALCOHOL DO YOU HAVE ON A TYPICAL DAY: 10 OR MORE
HOW OFTEN DO YOU HAVE A DRINK CONTAINING ALCOHOL: 4 OR MORE TIMES A WEEK

## 2023-10-21 NOTE — ED TRIAGE NOTES
Patient presents to the ED via EMS. Per EMS patient had a witnessed seizure that lasted approximately 1 min and has been in a postictal state since. Patient has no seizure history. Per EMS patient is a daily drinker. Patient is confused and is unable to answer questions. Patient was placed on bedside monitor, EKG completed and IV established. Writer will continue with plan of care.

## 2023-10-21 NOTE — ED PROVIDER NOTES
[facility] 1C STEPDOWN  Emergency Department Encounter  Emergency Medicine Resident     Pt Chalo Garcia  MRN: [id number]  [facility] 1970  Date of evaluation: 10/21/23  PCP:  Jaya Solo MD  Note Started: 6:03 PM EDT      CHIEF COMPLAINT       Chief Complaint   Patient presents with    Seizures       HISTORY OF PRESENT ILLNESS  (Location/Symptom, Timing/Onset, Context/Setting, Quality, Duration, Modifying Factors, Severity.)      Carry Found is a 45 y.o. female who presents with concern for seizure. Patient has a history of heavy alcohol abuse. Patient has been seen previously for alcohol intoxication. Patient does not have a history ofseizure disorder. Not on any antiseizure medications. Patient was allegedly seizing for approximately 1 minute per the son. Son called EMS. There was tongue biting involved. Patient did not urinate or defecate on herself. At bedside patient is alert, she is little confused however she is able to follow commands. Not complaining of any chest pain or shortness of breath no abdominal pain. Otherwise pleasant at bedside, no fevers or chills. PAST MEDICAL / SURGICAL / SOCIAL / FAMILY HISTORY      has a past medical history of Asthma, Diabetes mellitus ([address] HSV (herpes simplex virus) infection, HTN (hypertension), and Seasonal allergies. has a past surgical history that includes  section (); Dilation and curettage of uterus; and sterilization (2007). Social History     Socioeconomic History    Marital status: Single     Spouse name: Not on file    Number of children: Not on file    Years of education: Not on file    Highest education level: Not on file   Occupational History    Not on file   Tobacco Use    Smoking status: Every Day     Packs/day: 1.00     Years: 10.00     Additional pack years: 0.00     Total pack years: 10.00     Types: Cigarettes    Smokeless tobacco: Never   Substance and Sexual Activity    Alcohol use:  Yes

## 2023-10-21 NOTE — ED NOTES
Pure wick catheter placed to patient. Secured with brief.       Destiny Singh RN  10/21/23 Marissa [place]

## 2023-10-21 NOTE — CARE COORDINATION
Case Management Assessment  Initial Evaluation    Date/Time of Evaluation: 10/21/2023 7:30 PM  Assessment Completed by: Nick Cristobal RN    If patient is discharged prior to next notation, then this note serves as note for discharge by case management. Patient Name: Sangeeta Jackson                   YOB: 1970  Diagnosis: No admission diagnoses are documented for this encounter. Date / Time: 10/21/2023  5:49 PM    Patient Admission Status: Emergency   Readmission Risk (Low < 19, Mod (19-27), High > 27): No data recorded  Current PCP: Claudia Ruano MD  PCP verified by CM? Yes    Chart Reviewed: Yes      History Provided by: Patient  Patient Orientation: Alert and Oriented    Patient Cognition: Alert    Hospitalization in the last 30 days (Readmission):  No    If yes, Readmission Assessment in  Navigator will be completed. Advance Directives:      Code Status: Not on file   Patient's Primary Decision Maker is:        Discharge Planning:    Patient lives with: Children Type of Home: Apartment  Primary Care Giver: Self  Patient Support Systems include: Spouse/Significant Other, Children   Current Financial resources:    Current community resources:    Current services prior to admission: None            Current DME:              Type of Home Care services:       ADLS  Prior functional level: Independent in ADLs/IADLs  Current functional level: Independent in ADLs/IADLs    PT AM-PAC:   /24  OT AM-PAC:   /24    Family can provide assistance at DC: Would you like Case Management to discuss the discharge plan with any other family members/significant others, and if so, who?     Plans to Return to Present Housing: Yes  Other Identified Issues/Barriers to RETURNING to current housing: none  Potential Assistance needed at discharge: N/A            Potential DME:    Patient expects to discharge to:    Plan for transportation at discharge:      Financial    Payor: Roque Hunter / Plan: [employer] CHOICE-PPO MEDICARE / Product Type: *No Product type* /     Does insurance require precert for SNF: Yes    Potential assistance Purchasing Medications:    Meds-to-Beds request:        [id number] - [place], [address] [address]  Phone: [phone] Fax: [address] [address]  Phone: [phone] Fax: [address] [facility] [address] [facility], [address] [place] [address] [address] [facility] [address] [address]  Phone: [phone] Fax: [phone]      Notes:    Factors facilitating achievement of predicted outcomes: Family support    Barriers to discharge: Medical complications    Additional Case Management Notes: home with family    The Plan for Transition of Care is related to the following treatment goals of No admission diagnoses are documented for this encounter. IF APPLICABLE: The Patient and/or patient representative Daisha and her family were provided with a choice of provider and agrees with the discharge plan. Freedom of choice list with basic dialogue that supports the patient's individualized plan of care/goals and shares the quality data associated with the providers was provided to:     Patient Representative Name:       The Patient and/or Patient Representative Agree with the Discharge Plan?       Valarie Reyes RN  Case Management Department  Ph: [phone] Fax:
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
## 2023-10-21 NOTE — ED NOTES
Pt in bed pt Is Aox2 pt can tell me her name and where she is. Pt does not know date and who president is. Pt has boy friend at bedside. Pt given water and lights dimmed. Pt stated that she has no pain pt is not having tremors and denies any other C/O at this time.       Collette Pata, RN  10/21/23 8971

## 2023-10-22 ENCOUNTER — APPOINTMENT (OUTPATIENT)
Dept: MRI IMAGING | Age: 53
DRG: 101 | End: 2023-10-22
Payer: MEDICARE

## 2023-10-22 ENCOUNTER — APPOINTMENT (OUTPATIENT)
Dept: GENERAL RADIOLOGY | Age: 53
DRG: 101 | End: 2023-10-22
Payer: MEDICARE

## 2023-10-22 PROBLEM — R56.9 SEIZURE (HCC): Status: ACTIVE | Noted: 2023-10-22

## 2023-10-22 PROBLEM — F10.930 ALCOHOL WITHDRAWAL SYNDROME WITHOUT COMPLICATION (HCC): Status: ACTIVE | Noted: 2023-10-21

## 2023-10-22 PROBLEM — H55.00 NYSTAGMUS: Status: ACTIVE | Noted: 2023-10-22

## 2023-10-22 LAB
ALBUMIN SERPL-MCNC: 3.6 G/DL (ref 3.5–5.2)
ALBUMIN/GLOB SERPL: 1.1 {RATIO} (ref 1–2.5)
ALP SERPL-CCNC: 74 U/L (ref 35–104)
ALT SERPL-CCNC: 21 U/L (ref 5–33)
AMMONIA PLAS-SCNC: 31 UMOL/L (ref 11–51)
ANION GAP SERPL CALCULATED.3IONS-SCNC: 17 MMOL/L (ref 9–17)
AST SERPL-CCNC: 51 U/L
BASOPHILS # BLD: <0.03 K/UL (ref 0–0.2)
BASOPHILS NFR BLD: 0 % (ref 0–2)
BILIRUB SERPL-MCNC: 1.1 MG/DL (ref 0.3–1.2)
BODY TEMPERATURE: 37
BUN SERPL-MCNC: 6 MG/DL (ref 6–20)
CALCIUM SERPL-MCNC: 8.3 MG/DL (ref 8.6–10.4)
CHLORIDE SERPL-SCNC: 100 MMOL/L (ref 98–107)
CO2 SERPL-SCNC: 19 MMOL/L (ref 20–31)
COHGB MFR BLD: 1.7 % (ref 0–5)
CREAT SERPL-MCNC: 0.5 MG/DL (ref 0.5–0.9)
EOSINOPHIL # BLD: 0.05 K/UL (ref 0–0.44)
EOSINOPHILS RELATIVE PERCENT: 1 % (ref 1–4)
ERYTHROCYTE [DISTWIDTH] IN BLOOD BY AUTOMATED COUNT: 13.7 % (ref 11.8–14.4)
FIO2 ON VENT: ABNORMAL %
FOLATE SERPL-MCNC: >20 NG/ML
GFR SERPL CREATININE-BSD FRML MDRD: >60 ML/MIN/1.73M2
GLUCOSE BLD-MCNC: 159 MG/DL (ref 65–105)
GLUCOSE BLD-MCNC: 236 MG/DL (ref 65–105)
GLUCOSE BLD-MCNC: 73 MG/DL (ref 65–105)
GLUCOSE BLD-MCNC: 81 MG/DL (ref 65–105)
GLUCOSE BLD-MCNC: 95 MG/DL (ref 65–105)
GLUCOSE SERPL-MCNC: 75 MG/DL (ref 70–99)
HCO3 VENOUS: 22.6 MMOL/L (ref 24–30)
HCT VFR BLD AUTO: 42.2 % (ref 36.3–47.1)
HGB BLD-MCNC: 14.4 G/DL (ref 11.9–15.1)
IMM GRANULOCYTES # BLD AUTO: <0.03 K/UL (ref 0–0.3)
IMM GRANULOCYTES NFR BLD: 0 %
LYMPHOCYTES NFR BLD: 0.82 K/UL (ref 1.1–3.7)
LYMPHOCYTES RELATIVE PERCENT: 17 % (ref 24–43)
MCH RBC QN AUTO: 34.2 PG (ref 25.2–33.5)
MCHC RBC AUTO-ENTMCNC: 34.1 G/DL (ref 28.4–34.8)
MCV RBC AUTO: 100.2 FL (ref 82.6–102.9)
MONOCYTES NFR BLD: 0.38 K/UL (ref 0.1–1.2)
MONOCYTES NFR BLD: 8 % (ref 3–12)
NEGATIVE BASE EXCESS, VEN: 1.4 MMOL/L (ref 0–2)
NEUTROPHILS NFR BLD: 74 % (ref 36–65)
NEUTS SEG NFR BLD: 3.51 K/UL (ref 1.5–8.1)
NRBC BLD-RTO: 0 PER 100 WBC
O2 SAT, VEN: 90.9 % (ref 60–85)
PCO2, VEN: 37.5 MM HG (ref 39–55)
PH VENOUS: 7.4 (ref 7.32–7.42)
PHOSPHATE SERPL-MCNC: 3.3 MG/DL (ref 2.6–4.5)
PLATELET # BLD AUTO: 173 K/UL (ref 138–453)
PMV BLD AUTO: 8.8 FL (ref 8.1–13.5)
PO2, VEN: 63.1 MM HG (ref 30–50)
POTASSIUM SERPL-SCNC: 3.2 MMOL/L (ref 3.7–5.3)
PROT SERPL-MCNC: 6.8 G/DL (ref 6.4–8.3)
RBC # BLD AUTO: 4.21 M/UL (ref 3.95–5.11)
SODIUM SERPL-SCNC: 136 MMOL/L (ref 135–144)
VIT B12 SERPL-MCNC: 680 PG/ML (ref 232–1245)
WBC OTHER # BLD: 4.8 K/UL (ref 3.5–11.3)

## 2023-10-22 PROCEDURE — 82805 BLOOD GASES W/O2 SATURATION: CPT

## 2023-10-22 PROCEDURE — 6370000000 HC RX 637 (ALT 250 FOR IP)

## 2023-10-22 PROCEDURE — 99222 1ST HOSP IP/OBS MODERATE 55: CPT | Performed by: INTERNAL MEDICINE

## 2023-10-22 PROCEDURE — 6360000002 HC RX W HCPCS: Performed by: PSYCHIATRY & NEUROLOGY

## 2023-10-22 PROCEDURE — 2580000003 HC RX 258: Performed by: STUDENT IN AN ORGANIZED HEALTH CARE EDUCATION/TRAINING PROGRAM

## 2023-10-22 PROCEDURE — 80053 COMPREHEN METABOLIC PANEL: CPT

## 2023-10-22 PROCEDURE — 6360000002 HC RX W HCPCS

## 2023-10-22 PROCEDURE — 2580000003 HC RX 258: Performed by: INTERNAL MEDICINE

## 2023-10-22 PROCEDURE — 6360000002 HC RX W HCPCS: Performed by: STUDENT IN AN ORGANIZED HEALTH CARE EDUCATION/TRAINING PROGRAM

## 2023-10-22 PROCEDURE — 2500000003 HC RX 250 WO HCPCS: Performed by: STUDENT IN AN ORGANIZED HEALTH CARE EDUCATION/TRAINING PROGRAM

## 2023-10-22 PROCEDURE — 99223 1ST HOSP IP/OBS HIGH 75: CPT | Performed by: PSYCHIATRY & NEUROLOGY

## 2023-10-22 PROCEDURE — 36415 COLL VENOUS BLD VENIPUNCTURE: CPT

## 2023-10-22 PROCEDURE — 95816 EEG AWAKE AND DROWSY: CPT

## 2023-10-22 PROCEDURE — 85025 COMPLETE CBC W/AUTO DIFF WBC: CPT

## 2023-10-22 PROCEDURE — 82947 ASSAY GLUCOSE BLOOD QUANT: CPT

## 2023-10-22 PROCEDURE — 2580000003 HC RX 258

## 2023-10-22 PROCEDURE — 2060000000 HC ICU INTERMEDIATE R&B

## 2023-10-22 PROCEDURE — 84100 ASSAY OF PHOSPHORUS: CPT

## 2023-10-22 PROCEDURE — 74018 RADEX ABDOMEN 1 VIEW: CPT

## 2023-10-22 PROCEDURE — 95819 EEG AWAKE AND ASLEEP: CPT | Performed by: PSYCHIATRY & NEUROLOGY

## 2023-10-22 RX ORDER — LORAZEPAM 1 MG/1
1 TABLET ORAL
Status: DISCONTINUED | OUTPATIENT
Start: 2023-10-22 | End: 2023-10-25 | Stop reason: HOSPADM

## 2023-10-22 RX ORDER — POTASSIUM CHLORIDE 20 MEQ/1
20 TABLET, EXTENDED RELEASE ORAL ONCE
Status: COMPLETED | OUTPATIENT
Start: 2023-10-22 | End: 2023-10-22

## 2023-10-22 RX ORDER — LANOLIN ALCOHOL/MO/W.PET/CERES
3 CREAM (GRAM) TOPICAL ONCE
Status: COMPLETED | OUTPATIENT
Start: 2023-10-22 | End: 2023-10-22

## 2023-10-22 RX ORDER — LORAZEPAM 2 MG/ML
3 INJECTION INTRAMUSCULAR
Status: DISCONTINUED | OUTPATIENT
Start: 2023-10-22 | End: 2023-10-25 | Stop reason: HOSPADM

## 2023-10-22 RX ORDER — POTASSIUM CHLORIDE 7.45 MG/ML
10 INJECTION INTRAVENOUS
Status: DISPENSED | OUTPATIENT
Start: 2023-10-22 | End: 2023-10-22

## 2023-10-22 RX ORDER — MAGNESIUM SULFATE 1 G/100ML
1000 INJECTION INTRAVENOUS ONCE
Status: COMPLETED | OUTPATIENT
Start: 2023-10-22 | End: 2023-10-22

## 2023-10-22 RX ORDER — LORAZEPAM 2 MG/1
2 TABLET ORAL
Status: DISCONTINUED | OUTPATIENT
Start: 2023-10-22 | End: 2023-10-25 | Stop reason: HOSPADM

## 2023-10-22 RX ORDER — SODIUM CHLORIDE 0.9 % (FLUSH) 0.9 %
5-40 SYRINGE (ML) INJECTION EVERY 12 HOURS SCHEDULED
Status: DISCONTINUED | OUTPATIENT
Start: 2023-10-22 | End: 2023-10-25 | Stop reason: HOSPADM

## 2023-10-22 RX ORDER — SODIUM CHLORIDE 9 MG/ML
INJECTION, SOLUTION INTRAVENOUS PRN
Status: DISCONTINUED | OUTPATIENT
Start: 2023-10-22 | End: 2023-10-25 | Stop reason: HOSPADM

## 2023-10-22 RX ORDER — POTASSIUM CHLORIDE 20 MEQ/1
40 TABLET, EXTENDED RELEASE ORAL 2 TIMES DAILY WITH MEALS
Status: DISCONTINUED | OUTPATIENT
Start: 2023-10-22 | End: 2023-10-22

## 2023-10-22 RX ORDER — LORAZEPAM 2 MG/ML
1 INJECTION INTRAMUSCULAR
Status: DISCONTINUED | OUTPATIENT
Start: 2023-10-22 | End: 2023-10-25 | Stop reason: HOSPADM

## 2023-10-22 RX ORDER — SODIUM CHLORIDE 0.9 % (FLUSH) 0.9 %
5-40 SYRINGE (ML) INJECTION PRN
Status: DISCONTINUED | OUTPATIENT
Start: 2023-10-22 | End: 2023-10-25 | Stop reason: HOSPADM

## 2023-10-22 RX ORDER — LORAZEPAM 2 MG/1
4 TABLET ORAL
Status: DISCONTINUED | OUTPATIENT
Start: 2023-10-22 | End: 2023-10-25 | Stop reason: HOSPADM

## 2023-10-22 RX ORDER — CALCIUM GLUCONATE 20 MG/ML
2000 INJECTION, SOLUTION INTRAVENOUS ONCE
Status: COMPLETED | OUTPATIENT
Start: 2023-10-22 | End: 2023-10-22

## 2023-10-22 RX ORDER — LORAZEPAM 2 MG/ML
1 INJECTION INTRAMUSCULAR
Status: COMPLETED | OUTPATIENT
Start: 2023-10-22 | End: 2023-10-22

## 2023-10-22 RX ORDER — LORAZEPAM 2 MG/ML
4 INJECTION INTRAMUSCULAR
Status: DISCONTINUED | OUTPATIENT
Start: 2023-10-22 | End: 2023-10-25 | Stop reason: HOSPADM

## 2023-10-22 RX ORDER — LORAZEPAM 2 MG/ML
2 INJECTION INTRAMUSCULAR
Status: DISCONTINUED | OUTPATIENT
Start: 2023-10-22 | End: 2023-10-25 | Stop reason: HOSPADM

## 2023-10-22 RX ADMIN — FOLIC ACID: 5 INJECTION, SOLUTION INTRAMUSCULAR; INTRAVENOUS; SUBCUTANEOUS at 09:59

## 2023-10-22 RX ADMIN — LORAZEPAM 4 MG: 2 INJECTION INTRAMUSCULAR; INTRAVENOUS at 20:33

## 2023-10-22 RX ADMIN — INSULIN LISPRO 2 UNITS: 100 INJECTION, SOLUTION INTRAVENOUS; SUBCUTANEOUS at 16:26

## 2023-10-22 RX ADMIN — SODIUM CHLORIDE, PRESERVATIVE FREE 10 ML: 5 INJECTION INTRAVENOUS at 09:58

## 2023-10-22 RX ADMIN — SODIUM CHLORIDE: 9 INJECTION, SOLUTION INTRAVENOUS at 12:19

## 2023-10-22 RX ADMIN — LORAZEPAM 2 MG: 2 INJECTION INTRAMUSCULAR; INTRAVENOUS at 01:00

## 2023-10-22 RX ADMIN — MAGNESIUM SULFATE HEPTAHYDRATE 1000 MG: 1 INJECTION, SOLUTION INTRAVENOUS at 12:22

## 2023-10-22 RX ADMIN — POTASSIUM CHLORIDE 10 MEQ: 7.46 INJECTION, SOLUTION INTRAVENOUS at 13:09

## 2023-10-22 RX ADMIN — CALCIUM GLUCONATE 2000 MG: 20 INJECTION, SOLUTION INTRAVENOUS at 06:28

## 2023-10-22 RX ADMIN — LORAZEPAM 4 MG: 2 INJECTION INTRAMUSCULAR; INTRAVENOUS at 04:23

## 2023-10-22 RX ADMIN — SODIUM CHLORIDE, POTASSIUM CHLORIDE, SODIUM LACTATE AND CALCIUM CHLORIDE: 600; 310; 30; 20 INJECTION, SOLUTION INTRAVENOUS at 16:30

## 2023-10-22 RX ADMIN — Medication 3 MG: at 20:30

## 2023-10-22 RX ADMIN — POTASSIUM CHLORIDE 20 MEQ: 1500 TABLET, EXTENDED RELEASE ORAL at 16:23

## 2023-10-22 RX ADMIN — HYDROCHLOROTHIAZIDE 25 MG: 25 TABLET ORAL at 09:55

## 2023-10-22 RX ADMIN — LISINOPRIL 20 MG: 20 TABLET ORAL at 09:55

## 2023-10-22 RX ADMIN — POTASSIUM CHLORIDE 10 MEQ: 7.46 INJECTION, SOLUTION INTRAVENOUS at 14:40

## 2023-10-22 RX ADMIN — LORAZEPAM 1 MG: 2 INJECTION INTRAMUSCULAR; INTRAVENOUS at 18:37

## 2023-10-22 RX ADMIN — ENOXAPARIN SODIUM 40 MG: 100 INJECTION SUBCUTANEOUS at 09:55

## 2023-10-22 RX ADMIN — POTASSIUM CHLORIDE 10 MEQ: 7.46 INJECTION, SOLUTION INTRAVENOUS at 11:32

## 2023-10-22 NOTE — PROGRESS NOTES
[address]     Department of Internal Medicine - Staff Internal Medicine Teaching Service          ADMISSION NOTE/HISTORY AND PHYSICAL EXAMINATION   Date: 10/21/2023  Patient Name: [facility]  Date of admission: 10/21/2023  5:49 PM  YOB: 1970  PCP: Wilbur Emery MD  History Obtained From:  patient, emr     CHIEF COMPLAINT     Chief complaint: shaking of limbs, loss of consciousness     HISTORY OF PRESENTING ILLNESS     The patient is a pleasant 45 y.o. female with a PMHx significant for     htn  Alcohol abuse       presents with a chief complaint of shaking of both upper and lower extremities that happened today evening (10/21),she was apparently watching foot ball match, sitting on her couch when she started mumbling and began had shaking of both upper and lower extremities which lasted for a minute, this episode was witnessed by her significant other who was present during our evaluation , she also had frothing from her mouth , she lost consciousness and regained after arrival of ems   He denied any urinary or bowel incontinence,she does not remember anything about the event , not able to recollect anything before the event   - denies consumption of any drugs   -denies previous h/o seizures   -denies alcohol abuse, last drink was on Thursday, and she consumed 2 coolers, which has about 6% alcohol , she apparently consumes few coolers on weekends,     On our evaluation, she was oriented to place,person but not time or about why she is in the hospital, did not seem to recollect anything about the event , was slightly confused   -had no tremors or did not seem to be withdrawing   -CNS examination was normal   -did not complain of aches or muscle pains, no tenderness on examination     Review of Systems:  Review of Systems   Constitutional:  Negative for chills and fever. Respiratory:  Negative for cough, chest tightness, shortness of breath and wheezing.

## 2023-10-22 NOTE — PROGRESS NOTES
[place] [id number] [place] [address]  PROGRESS NOTE    Shift date: 10.21.2023  Shift day: Saturday   Shift # 2    Room # 0813/6040-20   Name: Katja Beyer                Synagogue: unknown   Place of Muslim: unknown    Referral: Routine Visit    Admit Date & Time: 10/21/2023  5:49 PM    Assessment:  Katja Beyer is a 45 y.o. female in the hospital with significant other at bedside. Upon entering the room writer observes the significant other calm and coping. Appears to be handling situation with the patient well but concerned. Determined support to be available. Intervention:  Writer introduced self and title as  Writer offered space for significant other  to express feelings, needs, and concerns and provided a ministry presence. Outcome:  Significant other remains calm and coping at this time. Plan:  Chaplains will remain available to offer spiritual and emotional support as needed.       Electronically signed by Loraine Hansen on 10/21/2023 at [address] [facility]  [phone]       10/21/23 2100   Encounter Summary   Encounter Overview/Reason  Initial Encounter   Service Provided For: Significant other   Referral/Consult From: [facility]   [facility] Significant other   Last Encounter  10/21/23   Complexity of Encounter Low   Begin Time 2100   End Time  2115   Total Time Calculated 15 min   Assessment/Intervention/Outcome   Assessment Calm;Coping   Intervention Active listening;Explored/Affirmed feelings, thoughts, concerns   Outcome Coping;Expressed feelings, needs, and concerns     Electronically signed by Taqueria Sam on 10/21/2023 at 10:40 PM

## 2023-10-22 NOTE — SENIOR SERVICES NOTE
Patient, 45years old female, with past medical history of.  - Hypertension.  - Alcohol abuse. Presents to ED with significant other, after she had a witnessed generalized shaking of her body. He states that they were watching television when she got confused, started mumbling and afterwards had sudden onset of generalized shaking of her body, for almost 1 minute. He called EMS and by the time EMS arrived patient was lethargic, confused and her shaking was gone. Patient states that she had no recollection of the event and she only remembers when she started getting headache afterwards she found herself in hospital.  As per significant other, he was told that patient had bit her tongue by EMS but had no urinary or stool incontinence. Patient arrived in ED, her EKG was done which showed nonspecific changes with sinus tachycardia. Per chart review patient is a daily drinker but patient and her significant other stated that her last drink was almost 1 week back, and that the only drink on weekends. Her VBG was done which showed pH 7.457, PCO2 30.5, HCO3 21.5. Initial lactic acid 11.4, trended down to 7.2. CBC unremarkable, BMP showing hypokalemia 3.2, elevated anion gap 24 with low bicarb 18. INR 1. Magnesium 1.8. Troponin 7, repeat 9. Her tox screen was done which showed minimal ethanol/acetaminophen and salicylic acid levels. TSH 4.74. Her CT head had no acute intracranial abnormality. On examination, patient is lethargic and weak. She is only oriented to place, person but not time. She is able to participate but very slow responses. Neurology was consulted in the ED who recommended further work-up. Patient states that she has a history of diabetes and high blood pressure.  In ED she almost got 1 L bolus of NS with Folic acid and IV thiamine     Principal Problem:    Tonic-clonic seizure ([address])  Active Problems:    HTN (hypertension)    Altered mental status    Lactic acidosis    Alcohol

## 2023-10-22 NOTE — PROGRESS NOTES
MICROBIOLOGY:   Lab Results   Component Value Date/Time    CULTURE NO GROWTH <24 HRS 10/21/2023 11:28 PM       Imaging:    CT HEAD WO CONTRAST    Result Date: 10/21/2023  No acute intracranial abnormality. CTA HEAD NECK W CONTRAST    Result Date: 10/21/2023  No acute abnormality or flow limiting stenosis of the major arteries of the head and neck. XR CHEST PORTABLE    Result Date: 10/21/2023  No radiographic evidence of acute cardiopulmonary pathology. ASSESSMENT & PLAN     Assessment and Plan:    Principal Problem:    Tonic-clonic seizure ([address])  Active Problems:    HTN (hypertension)    Altered mental status    Lactic acidosis    Alcohol abuse    Alcohol abuse with withdrawal (HCC)    Hypokalemia  Resolved Problems:    * No resolved hospital problems. *    Tonic clonic Seizure   Alcohol withdrawal vs underlying seizure disorder   First seizure per patient   -per history seems like tonic clonic seizure   -ct head unremarkable   -uds and blood tox unremarkable   -received 1mg ativan in ed, [facility] protocol in   -seizure precautions   -neurology on board, appreciate recs   -likely eeg and mri      Acute toxic metabolic encephalopathy  vs postictal state  Alert and oriented to place,person   -uds negative, tox screen negative  -no foci of infection, UA negative  -bmp potassium 3.2,chloride 95,hco3 18.ag - 24   -ammonia wnl   -tsh wnl   - osmolar gap <10   -follow up on blood culture   -follow up on mag.ammonia,b12 levels  -received thiamine and folic acid in er   -ct head negative for acute changes   -routine cbc , bmp monitoring      Alcohol abuse   -per chart has h/o alcohol abuse,   Alcohol level <10  -watch for withdrawals  -[facility] protocol  -thiamine,folate      Anion gap metabolic acidosis due to lactates vs alcoholic ketoacidosis? ?  Tachycardic and hypertensive on presentation   Elevated lactates , ketones in urine, blood gas showing mixed acid base disorder , euglycemic   -hypokalemia on presentation -monitor bmp   -replace electrolytes as needed   -received thiamine 100 mg in ed   -phosphorous levels  -mag levels wnl   -potassium levels - replete as necessary  -starting on d5        Hypertension   -monitor blood pressures  -per dispense report, recent dispense of lisinopril  -starting on lisinopril-hctz           DVT ppx:lovenox  GI ppx: protonix      PT/OT/SW  Discharge Planning:monitor inpatient       Pita Vasquez MD  Internal Medicine Resident, PGY-1  [address];  [place], [place]  10/22/2023, 6:41 AM

## 2023-10-22 NOTE — H&P
[address]     Department of Internal Medicine - Staff Internal Medicine Teaching Service          ADMISSION NOTE/HISTORY AND PHYSICAL EXAMINATION   Date: 10/21/2023  Patient Name: Mary Kent  Date of admission: 10/21/2023  5:49 PM  YOB: 1970  PCP: Ted Amador MD  History Obtained From:  patient, emr     CHIEF COMPLAINT     Chief complaint: shaking of limbs, loss of consciousness     HISTORY OF PRESENTING ILLNESS     The patient is a pleasant 45 y.o. female with a PMHx significant for     htn  Alcohol abuse       presents with a chief complaint of shaking of both upper and lower extremities that happened today evening (10/21),she was apparently watching foot ball match, sitting on her couch when she started mumbling and began had shaking of both upper and lower extremities which lasted for a minute, this episode was witnessed by her significant other who was present during our evaluation , she also had frothing from her mouth , she lost consciousness and regained after arrival of ems   He denied any urinary or bowel incontinence,she does not remember anything about the event , not able to recollect anything before the event   - denies consumption of any drugs   -denies previous h/o seizures   -denies alcohol abuse, last drink was on Thursday, and she consumed 2 coolers, which has about 6% alcohol , she apparently consumes few coolers on weekends,     On our evaluation, she was oriented to place,person but not time or about why she is in the hospital, did not seem to recollect anything about the event , was slightly confused   -had no tremors or did not seem to be withdrawing   -CNS examination was normal   -did not complain of aches or muscle pains, no tenderness on examination     Review of Systems:  Review of Systems   Constitutional:  Negative for chills and fever. Respiratory:  Negative for cough, chest tightness, shortness of breath and wheezing. abuse   -per chart has h/o alcohol abuse,   Alcohol level <10  -watch for withdrawals  -[facility] protocol  -thiamine,folate     Anion gap metabolic acidosis due to lactates vs alcoholic ketoacidosis? ?  Tachycardic and hypertensive on presentation   Elevated lactates , ketones in urine, blood gas showing mixed acid base disorder , euglycemic   -hypokalemia on presentation   -monitor bmp   -replace electrolytes as needed   -received thiamine 100 mg in ed   -phosphorous levels  -mag levels wnl   -potassium levels - replete as necessary    Hypertension   -monitor blood pressures  -per dispense report, recent dispense of lisinopril  -starting on lisinopril-hctz         DVT ppx:lovenox  GI ppx: protonix     PT/OT/SW  Discharge Planning:monitor inpatient     Romero Verde MD  Internal Medicine Resident, PGY-1  [address]; [place], [place]  10/21/2023, 9:22 PM    Attending Physician Statement  I have discussed the care of Luzma Saini,  including pertinent history and exam findings,  with the resident. I have seen and examined the patient and the key elements of all parts of the encounter have been performed by me. I agree with the assessment, plan and orders as documented by the resident.   (GC Modifier)      New onset seizures complicated with post ictal weakness and knee pain   Plan :   EEG, eval monitor symptoms     MD AROLDO Liu  Attending Physician, [facility] [place] [facility]   Faculty, Internal Medicine Residency Program  [address] [facility]  10/22/2023, 6:23 PM

## 2023-10-22 NOTE — PLAN OF CARE
Problem: Discharge Planning  Goal: Discharge to home or other facility with appropriate resources  Outcome: Progressing  Flowsheets (Taken 10/22/2023 0800)  Discharge to home or other facility with appropriate resources:   Identify barriers to discharge with patient and caregiver   Arrange for needed discharge resources and transportation as appropriate   Identify discharge learning needs (meds, wound care, etc)   Refer to discharge planning if patient needs post-hospital services based on physician order or complex needs related to functional status, cognitive ability or social support system     Problem: Confusion  Goal: Confusion, delirium, dementia, or psychosis is improved or at baseline  Description: INTERVENTIONS:  1. Assess for possible contributors to thought disturbance, including medications, impaired vision or hearing, underlying metabolic abnormalities, dehydration, psychiatric diagnoses, and notify attending LIP  2. [place] high risk fall precautions, as indicated  3. Provide frequent short contacts to provide reality reorientation, refocusing and direction  4. Decrease environmental stimuli, including noise as appropriate  5. Monitor and intervene to maintain adequate nutrition, hydration, elimination, sleep and activity  6. If unable to ensure safety without constant attention obtain sitter and review sitter guidelines with assigned personnel  7.  Initiate Psychosocial CNS and Spiritual Care consult, as indicated  Outcome: Progressing  Flowsheets (Taken 10/22/2023 0800)  Effect of thought disturbance (confusion, delirium, dementia, or psychosis) are managed with adequate functional status:   Assess for contributors to thought disturbance, including medications, impaired vision or hearing, underlying metabolic abnormalities, dehydration, psychiatric diagnoses, notify [address] high risk fall precautions, as indicated   Provide frequent short contacts to provide reality reorientation, refocusing and direction   Decrease environmental stimuli, including noise as appropriate   Monitor and intervene to maintain adequate nutrition, hydration, elimination, sleep and activity   If unable to ensure safety without constant attention obtain sitter and review sitter guidelines with assigned personnel     Problem: Safety - Adult  Goal: Free from fall injury  Outcome: Progressing  Flowsheets (Taken 10/22/2023 0802)  Free From Fall Injury: Instruct family/caregiver on patient safety     Problem: Skin/Tissue Integrity  Goal: Absence of new skin breakdown  Description: 1. Monitor for areas of redness and/or skin breakdown  2. Assess vascular access sites hourly  3. Every 4-6 hours minimum:  Change oxygen saturation probe site  4. Every 4-6 hours:  If on nasal continuous positive airway pressure, respiratory therapy assess nares and determine need for appliance change or resting period.   Outcome: Progressing     Problem: Chronic Conditions and Co-morbidities  Goal: Patient's chronic conditions and co-morbidity symptoms are monitored and maintained or improved  Outcome: Progressing  Flowsheets (Taken 10/22/2023 0800)  Care Plan - Patient's Chronic Conditions and Co-Morbidity Symptoms are Monitored and Maintained or Improved:   Monitor and assess patient's chronic conditions and comorbid symptoms for stability, deterioration, or improvement   Collaborate with multidisciplinary team to address chronic and comorbid conditions and prevent exacerbation or deterioration   Update acute care plan with appropriate goals if chronic or comorbid symptoms are exacerbated and prevent overall improvement and discharge

## 2023-10-22 NOTE — PROCEDURES
EEG REPORT       Patient: Roxann Mars Age: 45 y.o. MRN: [id number]      Referring Provider: No ref. provider found    History: This routine 30 minute scalp EEG was recorded with video- monitoring for a 45 y.o. Adrianna Fears female  with hx of HTN, COPD who presented after an episode of seizure like activity. This EEG was performed to evaluate for focal and epileptiform abnormalities.      Roxann Mars   Current Facility-Administered Medications   Medication Dose Route Frequency Provider Last Rate Last Admin    sodium chloride flush 0.9 % injection 5-40 mL  5-40 mL IntraVENous 2 times per day Naty Astudillo MD   10 mL at 10/22/23 0958    sodium chloride flush 0.9 % injection 5-40 mL  5-40 mL IntraVENous PRN Naty Astudillo MD        0.9 % sodium chloride infusion   IntraVENous PRN Naty Astudillo MD 25 mL/hr at 10/22/23 1219 New Bag at 10/22/23 1219    LORazepam (ATIVAN) tablet 1 mg  1 mg Oral Q1H PRN Naty Astudillo MD        Or    LORazepam (ATIVAN) injection 1 mg  1 mg IntraVENous Q1H PRN Naty Astudillo MD        Or    LORazepam (ATIVAN) tablet 2 mg  2 mg Oral Q1H PRN Naty Astudillo MD        Or    LORazepam (ATIVAN) injection 2 mg  2 mg IntraVENous Q1H PRN Naty Astudillo MD   2 mg at 10/22/23 0100    Or    LORazepam (ATIVAN) tablet 3 mg  3 mg Oral Q1H PRN Naty Astudillo MD        Or    LORazepam (ATIVAN) injection 3 mg  3 mg IntraVENous Q1H PRN Naty Astudillo MD        Or    LORazepam (ATIVAN) tablet 4 mg  4 mg Oral Q1H PRMARY BETH Astudillo MD        Or    LORazepam (ATIVAN) injection 4 mg  4 mg IntraVENous Q1H PRN Naty Astudillo MD   4 mg at 10/22/23 0423    potassium chloride 10 mEq/100 mL IVPB (Peripheral Line)  10 mEq IntraVENous Q1H Marilee Nageotte,  mL/hr at 10/22/23 1309 10 mEq at 10/22/23 1309    sodium chloride flush 0.9 % injection 5-40 mL  5-40 mL IntraVENous 2 times per day Simona Cardoza K, DO   6 mL at 10/21/23 2040    sodium chloride flush 0.9 % injection 5-40 mL 5-40 mL IntraVENous PRN Hartman Blizzard K, DO        0.9 % sodium chloride infusion   IntraVENous PRN Lamontromuloquita Daniel K, DO        folic acid 1 mg, thiamine (B-1) 100 mg in sodium chloride 0.9 % 50 mL IVPB   IntraVENous Daily Michael Morenoishna K, DO   Stopped at 10/22/23 1029    sodium chloride flush 0.9 % injection 5-40 mL  5-40 mL IntraVENous 2 times per day Jose Carlos Coreas MD   10 mL at 10/21/23 2350    sodium chloride flush 0.9 % injection 5-40 mL  5-40 mL IntraVENous PRN Jose Carlos Coreas MD        0.9 % sodium chloride infusion   IntraVENous PRN Jose Carlos Coreas MD        enoxaparin (LOVENOX) injection 40 mg  40 mg SubCUTAneous Daily Jose Carlos Coreas MD   40 mg at 10/22/23 0955    ondansetron (ZOFRAN-ODT) disintegrating tablet 4 mg  4 mg Oral Q8H PRN Jose Carlos Coreas MD        Or    ondansetron (ZOFRAN) injection 4 mg  4 mg IntraVENous Q6H PRN Jose Carlos Coreas MD        polyethylene glycol (GLYCOLAX) packet 17 g  17 g Oral Daily PRN Jose Carlos Coreas MD        acetaminophen (TYLENOL) tablet 650 mg  650 mg Oral Q6H PRN Jose Carlos Coreas MD        Or    acetaminophen (TYLENOL) suppository 650 mg  650 mg Rectal Q6H PRN Jose Carlos Coreas MD        pantoprazole (PROTONIX) tablet 40 mg  40 mg Oral QAM AC Osvaldo James MD        lisinopril (PRINIVIL;ZESTRIL) tablet 20 mg  20 mg Oral Daily Jose Carlos Coreas MD   20 mg at 10/22/23 2874    And    hydroCHLOROthiazide (HYDRODIURIL) tablet 25 mg  25 mg Oral Daily Jose Carlos Coreas MD   25 mg at 10/22/23 0955    potassium chloride (KLOR-CON M) extended release tablet 40 mEq  40 mEq Oral PRN Jose Carlos Coreas MD        Or    potassium bicarb-citric acid (EFFER-K) effervescent tablet 40 mEq  40 mEq Oral PRN Jose Carlos Coreas MD        Or    potassium chloride 10 mEq/100 mL IVPB (Peripheral Line)  10 mEq IntraVENous PRN Jose Carlos Coreas MD        magnesium sulfate 2000 mg in 50 mL IVPB premix  2,000 mg IntraVENous PRN Jose Carlosed Coreas MD        sodium phosphate 10

## 2023-10-23 ENCOUNTER — APPOINTMENT (OUTPATIENT)
Dept: MRI IMAGING | Age: 53
DRG: 101 | End: 2023-10-23
Payer: MEDICARE

## 2023-10-23 LAB
ALBUMIN SERPL-MCNC: 3.2 G/DL (ref 3.5–5.2)
ALBUMIN/GLOB SERPL: 1 {RATIO} (ref 1–2.5)
ALP SERPL-CCNC: 69 U/L (ref 35–104)
ALT SERPL-CCNC: 18 U/L (ref 5–33)
ANION GAP SERPL CALCULATED.3IONS-SCNC: 13 MMOL/L (ref 9–17)
AST SERPL-CCNC: 40 U/L
BILIRUB SERPL-MCNC: 0.8 MG/DL (ref 0.3–1.2)
BUN SERPL-MCNC: 5 MG/DL (ref 6–20)
CALCIUM SERPL-MCNC: 8.7 MG/DL (ref 8.6–10.4)
CHLORIDE SERPL-SCNC: 99 MMOL/L (ref 98–107)
CO2 SERPL-SCNC: 22 MMOL/L (ref 20–31)
CREAT SERPL-MCNC: 0.5 MG/DL (ref 0.5–0.9)
EKG ATRIAL RATE: 138 BPM
EKG P AXIS: 70 DEGREES
EKG P-R INTERVAL: 114 MS
EKG Q-T INTERVAL: 310 MS
EKG QRS DURATION: 74 MS
EKG QTC CALCULATION (BAZETT): 469 MS
EKG R AXIS: 23 DEGREES
EKG T AXIS: 78 DEGREES
EKG VENTRICULAR RATE: 138 BPM
GFR SERPL CREATININE-BSD FRML MDRD: >60 ML/MIN/1.73M2
GLUCOSE BLD-MCNC: 107 MG/DL (ref 65–105)
GLUCOSE BLD-MCNC: 146 MG/DL (ref 65–105)
GLUCOSE BLD-MCNC: 147 MG/DL (ref 65–105)
GLUCOSE BLD-MCNC: 182 MG/DL (ref 65–105)
GLUCOSE SERPL-MCNC: 100 MG/DL (ref 70–99)
POTASSIUM SERPL-SCNC: 3.3 MMOL/L (ref 3.7–5.3)
PROT SERPL-MCNC: 6.3 G/DL (ref 6.4–8.3)
SODIUM SERPL-SCNC: 134 MMOL/L (ref 135–144)

## 2023-10-23 PROCEDURE — 6360000002 HC RX W HCPCS: Performed by: STUDENT IN AN ORGANIZED HEALTH CARE EDUCATION/TRAINING PROGRAM

## 2023-10-23 PROCEDURE — 2060000000 HC ICU INTERMEDIATE R&B

## 2023-10-23 PROCEDURE — 82947 ASSAY GLUCOSE BLOOD QUANT: CPT

## 2023-10-23 PROCEDURE — 97535 SELF CARE MNGMENT TRAINING: CPT

## 2023-10-23 PROCEDURE — 36415 COLL VENOUS BLD VENIPUNCTURE: CPT

## 2023-10-23 PROCEDURE — 2500000003 HC RX 250 WO HCPCS: Performed by: STUDENT IN AN ORGANIZED HEALTH CARE EDUCATION/TRAINING PROGRAM

## 2023-10-23 PROCEDURE — 97166 OT EVAL MOD COMPLEX 45 MIN: CPT

## 2023-10-23 PROCEDURE — 80053 COMPREHEN METABOLIC PANEL: CPT

## 2023-10-23 PROCEDURE — 6370000000 HC RX 637 (ALT 250 FOR IP)

## 2023-10-23 PROCEDURE — 2580000003 HC RX 258: Performed by: STUDENT IN AN ORGANIZED HEALTH CARE EDUCATION/TRAINING PROGRAM

## 2023-10-23 PROCEDURE — 97530 THERAPEUTIC ACTIVITIES: CPT

## 2023-10-23 PROCEDURE — 2580000003 HC RX 258

## 2023-10-23 PROCEDURE — 99232 SBSQ HOSP IP/OBS MODERATE 35: CPT | Performed by: PSYCHIATRY & NEUROLOGY

## 2023-10-23 PROCEDURE — 6360000002 HC RX W HCPCS

## 2023-10-23 PROCEDURE — 97162 PT EVAL MOD COMPLEX 30 MIN: CPT

## 2023-10-23 PROCEDURE — 93010 ELECTROCARDIOGRAM REPORT: CPT | Performed by: INTERNAL MEDICINE

## 2023-10-23 PROCEDURE — 99232 SBSQ HOSP IP/OBS MODERATE 35: CPT | Performed by: STUDENT IN AN ORGANIZED HEALTH CARE EDUCATION/TRAINING PROGRAM

## 2023-10-23 RX ORDER — LORAZEPAM 2 MG/ML
2 INJECTION INTRAMUSCULAR ONCE
Status: DISCONTINUED | OUTPATIENT
Start: 2023-10-23 | End: 2023-10-23

## 2023-10-23 RX ORDER — CLONAZEPAM 1 MG/1
1 TABLET ORAL ONCE
Status: COMPLETED | OUTPATIENT
Start: 2023-10-23 | End: 2023-10-23

## 2023-10-23 RX ORDER — LORAZEPAM 2 MG/ML
2 INJECTION INTRAMUSCULAR ONCE
Status: COMPLETED | OUTPATIENT
Start: 2023-10-23 | End: 2023-10-23

## 2023-10-23 RX ADMIN — HYDROCHLOROTHIAZIDE 25 MG: 25 TABLET ORAL at 10:46

## 2023-10-23 RX ADMIN — FOLIC ACID: 5 INJECTION, SOLUTION INTRAMUSCULAR; INTRAVENOUS; SUBCUTANEOUS at 10:43

## 2023-10-23 RX ADMIN — ENOXAPARIN SODIUM 40 MG: 100 INJECTION SUBCUTANEOUS at 10:46

## 2023-10-23 RX ADMIN — LISINOPRIL 20 MG: 20 TABLET ORAL at 10:44

## 2023-10-23 RX ADMIN — PANTOPRAZOLE SODIUM 40 MG: 40 TABLET, DELAYED RELEASE ORAL at 10:48

## 2023-10-23 RX ADMIN — CLONAZEPAM 1 MG: 1 TABLET ORAL at 14:29

## 2023-10-23 RX ADMIN — Medication 2 MG: at 22:15

## 2023-10-23 RX ADMIN — POTASSIUM BICARBONATE 40 MEQ: 782 TABLET, EFFERVESCENT ORAL at 10:44

## 2023-10-23 RX ADMIN — SODIUM CHLORIDE, PRESERVATIVE FREE 10 ML: 5 INJECTION INTRAVENOUS at 10:46

## 2023-10-23 NOTE — PLAN OF CARE
Patient refusing MRI. Oral medication given 1 hour prior to MRI. Patient was put in for transport. When transport arrived, patient refused to come down.

## 2023-10-23 NOTE — CARE COORDINATION
[address] Flow/Interdisciplinary Rounds Progress Note    Quality Flow Rounds held on October 23, 2023 at [address] [facility] Attending:  Bedside Nurse, , and Nursing Unit Leadership    Barriers to Discharge: Clinical status    Anticipated Discharge Date:   10/24/23    Anticipated Discharge Disposition: Home    Readmission Risk              Risk of Unplanned Readmission:  11           Discussed patient goal for the day, patient clinical progression, and barriers to discharge. RN reports patient having 1:1 bedside sitter for agitation and impulsivity. [facility] protocol continued.   Jennifer Kwong RN  October 23, 2023

## 2023-10-23 NOTE — PROGRESS NOTES
Occupational Therapy  Facility/Department: [address] STEPDOWN  Occupational Therapy Initial Assessment    Name: Shauna Fermin  : 1970  MRN: [id number]  Date of Service: 10/23/2023    Chief Complaint   Patient presents with    Seizures         Discharge Recommendations:  Patient would benefit from continued therapy after discharge  OT Equipment Recommendations  Equipment Needed:  (Continue to assess pending progression. At this time, pt is unsafe to attempt any aspect of functional transfers/mobility without skilled assistance.)       Patient Diagnosis(es): The primary encounter diagnosis was Seizure [facility]). A diagnosis of Alcohol withdrawal syndrome without complication (HCC) was also pertinent to this visit. Past Medical History:  has a past medical history of Asthma, Diabetes mellitus ([address] HSV (herpes simplex virus) infection, HTN (hypertension), and Seasonal allergies. Past Surgical History:  has a past surgical history that includes  section (); Dilation and curettage of uterus; and sterilization (2007). Assessment   Performance deficits / Impairments: Decreased functional mobility ; Decreased ADL status; Decreased safe awareness;Decreased cognition;Decreased balance;Decreased high-level IADLs  Assessment: Pt agreed to OT this date. Pt engaged in bed mobility, functional transfers, and functional mobility this date requiring increased assistance with all transfers, standing and mobility d/t balance deficits. UB, LB, and toileting ADLs required increased assistance d/t balance and cognitive impairments. Pt exhibits significant cognitive and safety awareness impairments that greatly impact pt's ability to safely and independently perform ADLs/IADLs and functional mobility.  Pt will benefit from continued OT services to address deficits listed through use of skilled therapeutic interventions to increase functional independence and safety with ADLs/IADLs and functional responses to stimuli  Following Commands: Follows one step commands with increased time; Follows one step commands with repetition  Attention Span: Difficulty dividing attention; Difficulty attending to directions  Safety Judgement: Decreased awareness of need for assistance;Decreased awareness of need for safety  Problem Solving: Decreased awareness of errors  Insights: Decreased awareness of deficits  Initiation: Requires cues for some  Sequencing: Requires cues for some  Cognition Comment: Pt follows simple, one-step commands with increased cueing however is unable to follow multi-step commands this date  Orientation  Overall Orientation Status: Impaired  Orientation Level: Oriented to person;Disoriented to place; Disoriented to time;Disoriented to situation    Education Provided Comments: Pt ed on OT role, OT POC, orientation, safety awareness, transfer training, DME use, adaptive ADL tech, fall prevention, and importance of continued OT. Poor return d/t cognitive deficits.           Hand Dominance  Hand Dominance: Right            AM-PAC Score        [facility] Inpatient Daily Activity Raw Score: 18 (10/23/23 1457)  AM-PAC Inpatient ADL T-Scale Score : 38.66 (10/23/23 1457)  ADL Inpatient CMS 0-100% Score: 46.65 (10/23/23 1457)  ADL Inpatient CMS G-Code Modifier : CK (10/23/23 1457)    Goals  Short Term Goals  Time Frame for Short Term Goals: By discharge, pt will:  Short Term Goal 1: Demo SUP for functional transfers and functional mobility with use of LRAD PRN for engagement in ADLs/IADLs  Short Term Goal 2: Demo Mod I for UB ADLs and grooming tasks with no need for additional cueing  Short Term Goal 3: Demo SBA for LB ADLs and toileting tasks with setup and adaptive tech PRN  Short Term Goal 4: Demo SBA for dynamic standing and reaching outside JIN with unilateral hand release for 8 min to increase independence with ADLs/IADLs  Short Term Goal 5: Follow 50% of 2-step commands with appropriate initiation and

## 2023-10-23 NOTE — PLAN OF CARE
Problem: Discharge Planning  Goal: Discharge to home or other facility with appropriate resources  Outcome: Progressing     Problem: Confusion  Goal: Confusion, delirium, dementia, or psychosis is improved or at baseline  Description: INTERVENTIONS:  1. Assess for possible contributors to thought disturbance, including medications, impaired vision or hearing, underlying metabolic abnormalities, dehydration, psychiatric diagnoses, and notify attending LIP  2. [place] high risk fall precautions, as indicated  3. Provide frequent short contacts to provide reality reorientation, refocusing and direction  4. Decrease environmental stimuli, including noise as appropriate  5. Monitor and intervene to maintain adequate nutrition, hydration, elimination, sleep and activity  6. If unable to ensure safety without constant attention obtain sitter and review sitter guidelines with assigned personnel  7. Initiate Psychosocial CNS and Spiritual Care consult, as indicated  Outcome: Progressing     Problem: Safety - Adult  Goal: Free from fall injury  Outcome: Progressing  Flowsheets (Taken 10/23/2023 0800)  Free From Fall Injury: Instruct family/caregiver on patient safety     Problem: Skin/Tissue Integrity  Goal: Absence of new skin breakdown  Description: 1. Monitor for areas of redness and/or skin breakdown  2. Assess vascular access sites hourly  3. Every 4-6 hours minimum:  Change oxygen saturation probe site  4. Every 4-6 hours:  If on nasal continuous positive airway pressure, respiratory therapy assess nares and determine need for appliance change or resting period.   Outcome: Progressing     Problem: Chronic Conditions and Co-morbidities  Goal: Patient's chronic conditions and co-morbidity symptoms are monitored and maintained or improved  Outcome: Progressing

## 2023-10-23 NOTE — CARE COORDINATION
Received social work consult for consideration of rehab. Met with pt to complete assessment and offer resources. Pt admits to drinking approximately 2-3 times per week. She claims she typically only has a few drinks but has sometimes had up to 10 glasses of wine. Pt denies that she is concerned with her drinking and denies that her family is concerned. Pt also denies past treatment but has attended [address] meetings many years ago. Pt is not interested in tx at this time but was agreeable to taking resource lists and will consider returning to [facility]. AA directory provided to pt.

## 2023-10-23 NOTE — PLAN OF CARE
Problem: Discharge Planning  Goal: Discharge to home or other facility with appropriate resources  10/23/2023 0540 by Kin Rodriguez RN  Outcome: Progressing  10/22/2023 1745 by Scott Gillis RN  Outcome: Progressing  Flowsheets (Taken 10/22/2023 0800)  Discharge to home or other facility with appropriate resources:   Identify barriers to discharge with patient and caregiver   Arrange for needed discharge resources and transportation as appropriate   Identify discharge learning needs (meds, wound care, etc)   Refer to discharge planning if patient needs post-hospital services based on physician order or complex needs related to functional status, cognitive ability or social support system     Problem: Confusion  Goal: Confusion, delirium, dementia, or psychosis is improved or at baseline  Description: INTERVENTIONS:  1. Assess for possible contributors to thought disturbance, including medications, impaired vision or hearing, underlying metabolic abnormalities, dehydration, psychiatric diagnoses, and notify attending LIP  2. [place] high risk fall precautions, as indicated  3. Provide frequent short contacts to provide reality reorientation, refocusing and direction  4. Decrease environmental stimuli, including noise as appropriate  5. Monitor and intervene to maintain adequate nutrition, hydration, elimination, sleep and activity  6. If unable to ensure safety without constant attention obtain sitter and review sitter guidelines with assigned personnel  7.  Initiate Psychosocial CNS and Spiritual Care consult, as indicated  10/23/2023 0540 by Kin Rodriguez RN  Outcome: Progressing  10/22/2023 1745 by Scott Gillis RN  Outcome: Progressing  Flowsheets (Taken 10/22/2023 0800)  Effect of thought disturbance (confusion, delirium, dementia, or psychosis) are managed with adequate functional status:   Assess for contributors to thought disturbance, including medications, impaired vision or hearing, stability, deterioration, or improvement   Collaborate with multidisciplinary team to address chronic and comorbid conditions and prevent exacerbation or deterioration   Update acute care plan with appropriate goals if chronic or comorbid symptoms are exacerbated and prevent overall improvement and discharge

## 2023-10-23 NOTE — PROGRESS NOTES
[facility]  Internal Medicine Teaching Residency Program  Inpatient Daily Progress Note  ______________________________________________________________________________    Patient: Magui Chester  YOB: 1970   EMD:[id number]    Acct: [de-identified]     Room: [facility]  Admit date: 10/21/2023  Today's date: 10/23/23  Number of days in the hospital: 2    SUBJECTIVE   Admitting Diagnosis: Tonic-clonic seizure ([address])  CC: seizures and loss of awareness   Pt examined at bedside. Chart & results reviewed.    -vitals stable,afebrile overnight, hr in 90s  -alert oriented to place but not person,time or situation   - working with pt/ot   -no acute overnight events   -did receive 8mg ativan yesterday   -on LR infusion   -eeg showed no epileptiform activity, pending mri       ROS:  Constitutional:  negative for chills, fevers, sweats  Respiratory:  negative for cough, dyspnea on exertion, hemoptysis, shortness of breath, wheezing  Cardiovascular:  negative for chest pain, chest pressure/discomfort, lower extremity edema, palpitations  Gastrointestinal:  negative for abdominal pain, constipation, diarrhea, nausea, vomiting  Neurological:  negative for dizziness, headache    BRIEF HISTORY   The patient is a pleasant 45 y.o. female with a PMHx significant for      htn  Alcohol abuse         presents with a chief complaint of shaking of both upper and lower extremities that happened today evening (10/21),she was apparently watching foot ball match, sitting on her couch when she started mumbling and began had shaking of both upper and lower extremities which lasted for a minute, this episode was witnessed by her significant other who was present during our evaluation , she also had frothing from her mouth , she lost consciousness and regained after arrival of ems   He denied any urinary or bowel incontinence,she does not remember anything about the event , not able to

## 2023-10-24 ENCOUNTER — APPOINTMENT (OUTPATIENT)
Dept: MRI IMAGING | Age: 53
DRG: 101 | End: 2023-10-24
Payer: MEDICARE

## 2023-10-24 LAB
ALBUMIN SERPL-MCNC: 3.3 G/DL (ref 3.5–5.2)
ALBUMIN/GLOB SERPL: 1.1 {RATIO} (ref 1–2.5)
ALP SERPL-CCNC: 68 U/L (ref 35–104)
ALT SERPL-CCNC: 18 U/L (ref 5–33)
ANION GAP SERPL CALCULATED.3IONS-SCNC: 11 MMOL/L (ref 9–17)
AST SERPL-CCNC: 29 U/L
BASOPHILS # BLD: <0.03 K/UL (ref 0–0.2)
BASOPHILS NFR BLD: 1 % (ref 0–2)
BILIRUB SERPL-MCNC: 0.4 MG/DL (ref 0.3–1.2)
BUN SERPL-MCNC: 9 MG/DL (ref 6–20)
CALCIUM SERPL-MCNC: 8.7 MG/DL (ref 8.6–10.4)
CHLORIDE SERPL-SCNC: 98 MMOL/L (ref 98–107)
CO2 SERPL-SCNC: 27 MMOL/L (ref 20–31)
CREAT SERPL-MCNC: 0.7 MG/DL (ref 0.5–0.9)
EOSINOPHIL # BLD: 0.11 K/UL (ref 0–0.44)
EOSINOPHILS RELATIVE PERCENT: 3 % (ref 1–4)
ERYTHROCYTE [DISTWIDTH] IN BLOOD BY AUTOMATED COUNT: 13.3 % (ref 11.8–14.4)
GFR SERPL CREATININE-BSD FRML MDRD: >60 ML/MIN/1.73M2
GLUCOSE BLD-MCNC: 122 MG/DL (ref 65–105)
GLUCOSE BLD-MCNC: 122 MG/DL (ref 65–105)
GLUCOSE BLD-MCNC: 123 MG/DL (ref 65–105)
GLUCOSE BLD-MCNC: 129 MG/DL (ref 65–105)
GLUCOSE SERPL-MCNC: 113 MG/DL (ref 70–99)
HCT VFR BLD AUTO: 42.2 % (ref 36.3–47.1)
HGB BLD-MCNC: 14.2 G/DL (ref 11.9–15.1)
IMM GRANULOCYTES # BLD AUTO: <0.03 K/UL (ref 0–0.3)
IMM GRANULOCYTES NFR BLD: 1 %
LYMPHOCYTES NFR BLD: 1.41 K/UL (ref 1.1–3.7)
LYMPHOCYTES RELATIVE PERCENT: 37 % (ref 24–43)
MCH RBC QN AUTO: 34.5 PG (ref 25.2–33.5)
MCHC RBC AUTO-ENTMCNC: 33.6 G/DL (ref 28.4–34.8)
MCV RBC AUTO: 102.4 FL (ref 82.6–102.9)
MONOCYTES NFR BLD: 0.26 K/UL (ref 0.1–1.2)
MONOCYTES NFR BLD: 7 % (ref 3–12)
NEUTROPHILS NFR BLD: 51 % (ref 36–65)
NEUTS SEG NFR BLD: 1.96 K/UL (ref 1.5–8.1)
NRBC BLD-RTO: 0 PER 100 WBC
PLATELET # BLD AUTO: 159 K/UL (ref 138–453)
PMV BLD AUTO: 9.7 FL (ref 8.1–13.5)
POTASSIUM SERPL-SCNC: 3.2 MMOL/L (ref 3.7–5.3)
PROT SERPL-MCNC: 6.4 G/DL (ref 6.4–8.3)
RBC # BLD AUTO: 4.12 M/UL (ref 3.95–5.11)
SODIUM SERPL-SCNC: 136 MMOL/L (ref 135–144)
WBC OTHER # BLD: 3.8 K/UL (ref 3.5–11.3)

## 2023-10-24 PROCEDURE — 99232 SBSQ HOSP IP/OBS MODERATE 35: CPT | Performed by: STUDENT IN AN ORGANIZED HEALTH CARE EDUCATION/TRAINING PROGRAM

## 2023-10-24 PROCEDURE — 2580000003 HC RX 258

## 2023-10-24 PROCEDURE — 6360000002 HC RX W HCPCS: Performed by: STUDENT IN AN ORGANIZED HEALTH CARE EDUCATION/TRAINING PROGRAM

## 2023-10-24 PROCEDURE — 2580000003 HC RX 258: Performed by: STUDENT IN AN ORGANIZED HEALTH CARE EDUCATION/TRAINING PROGRAM

## 2023-10-24 PROCEDURE — 84425 ASSAY OF VITAMIN B-1: CPT

## 2023-10-24 PROCEDURE — 82947 ASSAY GLUCOSE BLOOD QUANT: CPT

## 2023-10-24 PROCEDURE — 85025 COMPLETE CBC W/AUTO DIFF WBC: CPT

## 2023-10-24 PROCEDURE — 80053 COMPREHEN METABOLIC PANEL: CPT

## 2023-10-24 PROCEDURE — 99233 SBSQ HOSP IP/OBS HIGH 50: CPT | Performed by: PSYCHIATRY & NEUROLOGY

## 2023-10-24 PROCEDURE — 70551 MRI BRAIN STEM W/O DYE: CPT

## 2023-10-24 PROCEDURE — 2060000000 HC ICU INTERMEDIATE R&B

## 2023-10-24 PROCEDURE — 6370000000 HC RX 637 (ALT 250 FOR IP)

## 2023-10-24 PROCEDURE — 36415 COLL VENOUS BLD VENIPUNCTURE: CPT

## 2023-10-24 PROCEDURE — 6360000002 HC RX W HCPCS

## 2023-10-24 PROCEDURE — 2500000003 HC RX 250 WO HCPCS: Performed by: STUDENT IN AN ORGANIZED HEALTH CARE EDUCATION/TRAINING PROGRAM

## 2023-10-24 RX ORDER — CHLORDIAZEPOXIDE HYDROCHLORIDE 5 MG/1
15 CAPSULE, GELATIN COATED ORAL 2 TIMES DAILY
Status: DISCONTINUED | OUTPATIENT
Start: 2023-10-24 | End: 2023-10-25 | Stop reason: HOSPADM

## 2023-10-24 RX ORDER — SODIUM CHLORIDE 0.9 % (FLUSH) 0.9 %
10 SYRINGE (ML) INJECTION PRN
Status: DISCONTINUED | OUTPATIENT
Start: 2023-10-24 | End: 2023-10-25 | Stop reason: HOSPADM

## 2023-10-24 RX ORDER — POTASSIUM CHLORIDE 20 MEQ/1
40 TABLET, EXTENDED RELEASE ORAL ONCE
Status: COMPLETED | OUTPATIENT
Start: 2023-10-24 | End: 2023-10-24

## 2023-10-24 RX ADMIN — POTASSIUM CHLORIDE 40 MEQ: 1500 TABLET, EXTENDED RELEASE ORAL at 12:03

## 2023-10-24 RX ADMIN — SODIUM CHLORIDE: 9 INJECTION, SOLUTION INTRAVENOUS at 10:28

## 2023-10-24 RX ADMIN — ENOXAPARIN SODIUM 40 MG: 100 INJECTION SUBCUTANEOUS at 08:00

## 2023-10-24 RX ADMIN — CHLORDIAZEPOXIDE HYDROCHLORIDE 15 MG: 5 CAPSULE ORAL at 12:03

## 2023-10-24 RX ADMIN — PANTOPRAZOLE SODIUM 40 MG: 40 TABLET, DELAYED RELEASE ORAL at 08:00

## 2023-10-24 RX ADMIN — FOLIC ACID: 5 INJECTION, SOLUTION INTRAMUSCULAR; INTRAVENOUS; SUBCUTANEOUS at 10:28

## 2023-10-24 RX ADMIN — CHLORDIAZEPOXIDE HYDROCHLORIDE 15 MG: 5 CAPSULE ORAL at 20:37

## 2023-10-24 ASSESSMENT — ENCOUNTER SYMPTOMS
SORE THROAT: 0
RHINORRHEA: 0
ABDOMINAL PAIN: 0
NAUSEA: 0
SHORTNESS OF BREATH: 0
VOMITING: 0
COUGH: 0

## 2023-10-24 NOTE — PLAN OF CARE
Problem: Discharge Planning  Goal: Discharge to home or other facility with appropriate resources  10/24/2023 1747 by Leyda Avitia RN  Outcome: Progressing  Flowsheets (Taken 10/24/2023 0800)  Discharge to home or other facility with appropriate resources:   Identify barriers to discharge with patient and caregiver   Arrange for needed discharge resources and transportation as appropriate   Identify discharge learning needs (meds, wound care, etc)   Refer to discharge planning if patient needs post-hospital services based on physician order or complex needs related to functional status, cognitive ability or social support system  10/24/2023 0624 by Wendy Curiel RN  Outcome: Not Progressing     Problem: Confusion  Goal: Confusion, delirium, dementia, or psychosis is improved or at baseline  Description: INTERVENTIONS:  1. Assess for possible contributors to thought disturbance, including medications, impaired vision or hearing, underlying metabolic abnormalities, dehydration, psychiatric diagnoses, and notify attending LIP  2. [place] high risk fall precautions, as indicated  3. Provide frequent short contacts to provide reality reorientation, refocusing and direction  4. Decrease environmental stimuli, including noise as appropriate  5. Monitor and intervene to maintain adequate nutrition, hydration, elimination, sleep and activity  6. If unable to ensure safety without constant attention obtain sitter and review sitter guidelines with assigned personnel  7.  Initiate Psychosocial CNS and Spiritual Care consult, as indicated  10/24/2023 1747 by Leyda Avitia RN  Outcome: Progressing  10/24/2023 0624 by Wendy Curiel RN  Outcome: Not Progressing     Problem: Chronic Conditions and Co-morbidities  Goal: Patient's chronic conditions and co-morbidity symptoms are monitored and maintained or improved  10/24/2023 1747 by Leyda Avitia RN  Outcome: Progressing  Flowsheets (Taken 10/24/2023

## 2023-10-24 NOTE — PLAN OF CARE
Problem: Discharge Planning  Goal: Discharge to home or other facility with appropriate resources  10/24/2023 0624 by Emerald Ferrer RN  Outcome: Not Progressing  10/23/2023 1943 by Capo Wei RN  Outcome: Progressing     Problem: Confusion  Goal: Confusion, delirium, dementia, or psychosis is improved or at baseline  Description: INTERVENTIONS:  1. Assess for possible contributors to thought disturbance, including medications, impaired vision or hearing, underlying metabolic abnormalities, dehydration, psychiatric diagnoses, and notify attending LIP  2. [place] high risk fall precautions, as indicated  3. Provide frequent short contacts to provide reality reorientation, refocusing and direction  4. Decrease environmental stimuli, including noise as appropriate  5. Monitor and intervene to maintain adequate nutrition, hydration, elimination, sleep and activity  6. If unable to ensure safety without constant attention obtain sitter and review sitter guidelines with assigned personnel  7.  Initiate Psychosocial CNS and Spiritual Care consult, as indicated  10/24/2023 3816 by Emerald Ferrer RN  Outcome: Not Progressing  10/23/2023 1943 by Capo Wei RN  Outcome: Progressing     Problem: Chronic Conditions and Co-morbidities  Goal: Patient's chronic conditions and co-morbidity symptoms are monitored and maintained or improved  10/24/2023 0624 by Emerald Ferrer RN  Outcome: Not Progressing  10/23/2023 1943 by Capo Wei RN  Outcome: Progressing

## 2023-10-24 NOTE — CARE COORDINATION
CM updated by RN that patient has been pink slipped. No psych consult placed. PS message sent to on-call resident notifying that psych consult needs to be placed if patient is pink slipped.
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
## 2023-10-24 NOTE — PROGRESS NOTES
[facility]  Internal Medicine Teaching Residency Program  Inpatient Daily Progress Note  ______________________________________________________________________________    Patient: Luis Alfredo Child  YOB: 1970   [id number]    Acct: [de-identified]     Room: [address] [place] [address]  Admit date: 10/21/2023  Today's date: 10/24/23  Number of days in the hospital: 3    SUBJECTIVE   Admitting Diagnosis: Tonic-clonic seizure ([address])  CC: seizures and loss of awareness   Pt examined at bedside. Chart & results reviewed.    -declined mri yesterday, she was confused and not well oriented yesterday, was refusing Iv access and vitals   -received 2mg I'm ativan  -much more oriented , but still has no idea about the situation or why she is in the hospital   -sitter accompanying  -reports good apetite     ROS:  Constitutional:  negative for chills, fevers, sweats  Respiratory:  negative for cough, dyspnea on exertion, hemoptysis, shortness of breath, wheezing  Cardiovascular:  negative for chest pain, chest pressure/discomfort, lower extremity edema, palpitations  Gastrointestinal:  negative for abdominal pain, constipation, diarrhea, nausea, vomiting  Neurological:  negative for dizziness, headache    BRIEF HISTORY   The patient is a pleasant 45 y.o. female with a PMHx significant for      htn  Alcohol abuse         presents with a chief complaint of shaking of both upper and lower extremities that happened today evening (10/21),she was apparently watching foot ball match, sitting on her couch when she started mumbling and began had shaking of both upper and lower extremities which lasted for a minute, this episode was witnessed by her significant other who was present during our evaluation , she also had frothing from her mouth , she lost consciousness and regained after arrival of ems   He denied any urinary or bowel incontinence,she does not remember anything about the event , not soft , hold anti htn   -monitor blood pressures  -per dispense report, recent dispense of lisinopril  -starting on lisinopril-hctz      Anion gap metabolic acidosis due to lactates vs alcoholic ketoacidosis - resolved   Tachycardic and hypertensive on presentation   Elevated lactates , ketones in urine, blood gas showing mixed acid base disorder , euglycemic   -hypokalemia on presentation   -resolved after fluids, d5 infusion      DVT ppx:lovenox  GI ppx: protonix      PT/OT/SW  Discharge Planning:monitor for withdrawals, mri today   Still confused       Jeri Landry MD  Internal Medicine Resident, PGY-1  [address];  [facility]  10/24/2023, 8:00 AM

## 2023-10-25 VITALS
DIASTOLIC BLOOD PRESSURE: 72 MMHG | OXYGEN SATURATION: 100 % | TEMPERATURE: 97.5 F | WEIGHT: 156.97 LBS | HEIGHT: 62 IN | BODY MASS INDEX: 28.89 KG/M2 | HEART RATE: 94 BPM | RESPIRATION RATE: 14 BRPM | SYSTOLIC BLOOD PRESSURE: 101 MMHG

## 2023-10-25 LAB
ALBUMIN SERPL-MCNC: 3.4 G/DL (ref 3.5–5.2)
ALBUMIN/GLOB SERPL: 1.2 {RATIO} (ref 1–2.5)
ALP SERPL-CCNC: 72 U/L (ref 35–104)
ALT SERPL-CCNC: 17 U/L (ref 5–33)
ANION GAP SERPL CALCULATED.3IONS-SCNC: 11 MMOL/L (ref 9–17)
AST SERPL-CCNC: 27 U/L
BASOPHILS # BLD: 0.03 K/UL (ref 0–0.2)
BASOPHILS NFR BLD: 1 % (ref 0–2)
BILIRUB SERPL-MCNC: 0.2 MG/DL (ref 0.3–1.2)
BUN SERPL-MCNC: 13 MG/DL (ref 6–20)
CALCIUM SERPL-MCNC: 8.7 MG/DL (ref 8.6–10.4)
CHLORIDE SERPL-SCNC: 100 MMOL/L (ref 98–107)
CO2 SERPL-SCNC: 27 MMOL/L (ref 20–31)
CREAT SERPL-MCNC: 0.7 MG/DL (ref 0.5–0.9)
EOSINOPHIL # BLD: 0.12 K/UL (ref 0–0.44)
EOSINOPHILS RELATIVE PERCENT: 3 % (ref 1–4)
ERYTHROCYTE [DISTWIDTH] IN BLOOD BY AUTOMATED COUNT: 13.6 % (ref 11.8–14.4)
GFR SERPL CREATININE-BSD FRML MDRD: >60 ML/MIN/1.73M2
GLUCOSE BLD-MCNC: 113 MG/DL (ref 65–105)
GLUCOSE BLD-MCNC: 136 MG/DL (ref 65–105)
GLUCOSE SERPL-MCNC: 105 MG/DL (ref 70–99)
HCT VFR BLD AUTO: 38.1 % (ref 36.3–47.1)
HGB BLD-MCNC: 12.7 G/DL (ref 11.9–15.1)
IMM GRANULOCYTES # BLD AUTO: <0.03 K/UL (ref 0–0.3)
IMM GRANULOCYTES NFR BLD: 1 %
LYMPHOCYTES NFR BLD: 1.5 K/UL (ref 1.1–3.7)
LYMPHOCYTES RELATIVE PERCENT: 41 % (ref 24–43)
MCH RBC QN AUTO: 33.9 PG (ref 25.2–33.5)
MCHC RBC AUTO-ENTMCNC: 33.3 G/DL (ref 28.4–34.8)
MCV RBC AUTO: 101.6 FL (ref 82.6–102.9)
MONOCYTES NFR BLD: 0.33 K/UL (ref 0.1–1.2)
MONOCYTES NFR BLD: 9 % (ref 3–12)
NEUTROPHILS NFR BLD: 45 % (ref 36–65)
NEUTS SEG NFR BLD: 1.68 K/UL (ref 1.5–8.1)
NRBC BLD-RTO: 0 PER 100 WBC
PLATELET # BLD AUTO: 187 K/UL (ref 138–453)
PMV BLD AUTO: 9.5 FL (ref 8.1–13.5)
POTASSIUM SERPL-SCNC: 3.4 MMOL/L (ref 3.7–5.3)
PROT SERPL-MCNC: 6.3 G/DL (ref 6.4–8.3)
RBC # BLD AUTO: 3.75 M/UL (ref 3.95–5.11)
SODIUM SERPL-SCNC: 138 MMOL/L (ref 135–144)
WBC OTHER # BLD: 3.7 K/UL (ref 3.5–11.3)

## 2023-10-25 PROCEDURE — 80053 COMPREHEN METABOLIC PANEL: CPT

## 2023-10-25 PROCEDURE — 99232 SBSQ HOSP IP/OBS MODERATE 35: CPT | Performed by: STUDENT IN AN ORGANIZED HEALTH CARE EDUCATION/TRAINING PROGRAM

## 2023-10-25 PROCEDURE — 6360000002 HC RX W HCPCS

## 2023-10-25 PROCEDURE — 6370000000 HC RX 637 (ALT 250 FOR IP)

## 2023-10-25 PROCEDURE — 36415 COLL VENOUS BLD VENIPUNCTURE: CPT

## 2023-10-25 PROCEDURE — 99221 1ST HOSP IP/OBS SF/LOW 40: CPT | Performed by: PSYCHIATRY & NEUROLOGY

## 2023-10-25 PROCEDURE — 85025 COMPLETE CBC W/AUTO DIFF WBC: CPT

## 2023-10-25 PROCEDURE — 82947 ASSAY GLUCOSE BLOOD QUANT: CPT

## 2023-10-25 RX ORDER — LANOLIN ALCOHOL/MO/W.PET/CERES
100 CREAM (GRAM) TOPICAL DAILY
Status: DISCONTINUED | OUTPATIENT
Start: 2023-10-25 | End: 2023-10-25 | Stop reason: HOSPADM

## 2023-10-25 RX ORDER — FOLIC ACID 1 MG/1
1 TABLET ORAL DAILY
Status: DISCONTINUED | OUTPATIENT
Start: 2023-10-25 | End: 2023-10-25 | Stop reason: HOSPADM

## 2023-10-25 RX ORDER — FOLIC ACID 1 MG/1
1 TABLET ORAL DAILY
Qty: 30 TABLET | Refills: 3 | Status: SHIPPED | OUTPATIENT
Start: 2023-10-26

## 2023-10-25 RX ORDER — CHLORDIAZEPOXIDE HYDROCHLORIDE 5 MG/1
5 CAPSULE, GELATIN COATED ORAL 2 TIMES DAILY
Qty: 2 CAPSULE | Refills: 0 | Status: SHIPPED | OUTPATIENT
Start: 2023-10-27 | End: 2023-10-25 | Stop reason: SDUPTHER

## 2023-10-25 RX ORDER — CHLORDIAZEPOXIDE HYDROCHLORIDE 5 MG/1
5 CAPSULE, GELATIN COATED ORAL 2 TIMES DAILY
Qty: 2 CAPSULE | Refills: 0 | Status: SHIPPED | OUTPATIENT
Start: 2023-10-27 | End: 2023-10-28

## 2023-10-25 RX ORDER — POTASSIUM CHLORIDE 20 MEQ/1
40 TABLET, EXTENDED RELEASE ORAL ONCE
Status: DISCONTINUED | OUTPATIENT
Start: 2023-10-25 | End: 2023-10-25 | Stop reason: HOSPADM

## 2023-10-25 RX ORDER — CHLORDIAZEPOXIDE HYDROCHLORIDE 10 MG/1
10 CAPSULE, GELATIN COATED ORAL 2 TIMES DAILY
Qty: 2 CAPSULE | Refills: 0 | Status: SHIPPED | OUTPATIENT
Start: 2023-10-26 | End: 2023-10-27

## 2023-10-25 RX ORDER — THIAMINE MONONITRATE (VIT B1) 100 MG
100 TABLET ORAL DAILY
Qty: 30 TABLET | Refills: 3 | Status: SHIPPED | OUTPATIENT
Start: 2023-10-25

## 2023-10-25 RX ORDER — CHLORDIAZEPOXIDE HYDROCHLORIDE 10 MG/1
10 CAPSULE, GELATIN COATED ORAL 2 TIMES DAILY
Qty: 2 CAPSULE | Refills: 0 | Status: SHIPPED | OUTPATIENT
Start: 2023-10-26 | End: 2023-10-25 | Stop reason: SDUPTHER

## 2023-10-25 RX ADMIN — Medication 100 MG: at 09:05

## 2023-10-25 RX ADMIN — FOLIC ACID 1 MG: 1 TABLET ORAL at 09:05

## 2023-10-25 RX ADMIN — POTASSIUM BICARBONATE 40 MEQ: 782 TABLET, EFFERVESCENT ORAL at 09:05

## 2023-10-25 RX ADMIN — CHLORDIAZEPOXIDE HYDROCHLORIDE 15 MG: 5 CAPSULE ORAL at 09:05

## 2023-10-25 RX ADMIN — ENOXAPARIN SODIUM 40 MG: 100 INJECTION SUBCUTANEOUS at 09:05

## 2023-10-25 NOTE — PROGRESS NOTES
[facility]  Internal Medicine Teaching Residency Program  Inpatient Daily Progress Note  ______________________________________________________________________________    Patient: Thao Tyler  YOB: 1970   XUZ:[id number]    Acct: [de-identified]     Room: [facility]  Admit date: 10/21/2023  Today's date: 10/25/23  Number of days in the hospital: 4    SUBJECTIVE   Admitting Diagnosis: Tonic-clonic seizure ([address])  CC: seizures and loss of awareness   Pt examined at bedside. Chart & results reviewed.    - is alert but still confused,   -denies any complaints   -reports good apetite     ROS:  Constitutional:  negative for chills, fevers, sweats  Respiratory:  negative for cough, dyspnea on exertion, hemoptysis, shortness of breath, wheezing  Cardiovascular:  negative for chest pain, chest pressure/discomfort, lower extremity edema, palpitations  Gastrointestinal:  negative for abdominal pain, constipation, diarrhea, nausea, vomiting  Neurological:  negative for dizziness, headache    BRIEF HISTORY   The patient is a pleasant 45 y.o. female with a PMHx significant for      htn  Alcohol abuse         presents with a chief complaint of shaking of both upper and lower extremities that happened today evening (10/21),she was apparently watching foot ball match, sitting on her couch when she started mumbling and began had shaking of both upper and lower extremities which lasted for a minute, this episode was witnessed by her significant other who was present during our evaluation , she also had frothing from her mouth , she lost consciousness and regained after arrival of ems   He denied any urinary or bowel incontinence,she does not remember anything about the event , not able to recollect anything before the event   - denies consumption of any drugs   -denies previous h/o seizures   -denies alcohol abuse, last drink was on Thursday, and she consumed 2 coolers, which

## 2023-10-25 NOTE — CARE COORDINATION
CM spoke with patient to discuss transitional plan. Patient states that she will be returning home independently. Patient states that she does not have transportation home and requests that cab be arranged to [address] [place] [address] Drive, [id number]  cab booked for 4pm. Patient verbalizes having no additional transitional needs at this time. 56- RN notified CM that meds are still not ready.  CM called [employer] and requested cab time be changed to 5pm.    Discharge [address] Case Management Department  Written by: Karen Lawson RN    Patient Name: Alicia Rivero  Attending Provider: Vidal Richmond MD  Admit Date: 10/21/2023  5:49 PM  MRN: [id number]  Account: [de-identified]                     : 1970  Discharge Date: 10/25/23      Disposition: home    Karen Lawson RN

## 2023-10-25 NOTE — PROGRESS NOTES
Pt self removed IV catheter. Pt refusing to have another placed at this time. Pt refusing to wear telemetry intermittently and refusing to wear incontinence briefs. Pt educated on importance. No evidence of learning noted. Pt only oriented to self at this time. Primary team notified.

## 2023-10-25 NOTE — PROGRESS NOTES
Pt dc'd home via cab after receiving meds from outpt pharmacy. Pt did not show evidence of capacity to  understand discharge instructions. Writer spoke w pt's son on the phone; went over dc instructions and importance of pt to continue taking librium and abstain from drinking alcohol.

## 2023-10-25 NOTE — PLAN OF CARE
Problem: Confusion  Goal: Confusion, delirium, dementia, or psychosis is improved or at baseline  Description: INTERVENTIONS:  1. Assess for possible contributors to thought disturbance, including medications, impaired vision or hearing, underlying metabolic abnormalities, dehydration, psychiatric diagnoses, and notify attending LIP  2. [place] high risk fall precautions, as indicated  3. Provide frequent short contacts to provide reality reorientation, refocusing and direction  4. Decrease environmental stimuli, including noise as appropriate  5. Monitor and intervene to maintain adequate nutrition, hydration, elimination, sleep and activity  6. If unable to ensure safety without constant attention obtain sitter and review sitter guidelines with assigned personnel  7.  Initiate Psychosocial CNS and Spiritual Care consult, as indicated  10/25/2023 1414 by Shyann Kramer RN  Outcome: Completed  10/25/2023 0524 by Abdirizak Baker RN  Outcome: Not Progressing     Problem: Safety - Adult  Goal: Free from fall injury  10/25/2023 1414 by Shyann Kramer RN  Outcome: Completed  10/25/2023 0524 by Abdirizak Baker RN  Outcome: Not Progressing     Problem: Chronic Conditions and Co-morbidities  Goal: Patient's chronic conditions and co-morbidity symptoms are monitored and maintained or improved  10/25/2023 1414 by Shyann Kramer RN  Outcome: Completed  10/25/2023 0524 by Abdirizak Baker RN  Outcome: Not Progressing

## 2023-10-25 NOTE — PLAN OF CARE
Problem: Confusion  Goal: Confusion, delirium, dementia, or psychosis is improved or at baseline  Description: INTERVENTIONS:  1. Assess for possible contributors to thought disturbance, including medications, impaired vision or hearing, underlying metabolic abnormalities, dehydration, psychiatric diagnoses, and notify attending LIP  2. [place] high risk fall precautions, as indicated  3. Provide frequent short contacts to provide reality reorientation, refocusing and direction  4. Decrease environmental stimuli, including noise as appropriate  5. Monitor and intervene to maintain adequate nutrition, hydration, elimination, sleep and activity  6. If unable to ensure safety without constant attention obtain sitter and review sitter guidelines with assigned personnel  7.  Initiate Psychosocial CNS and Spiritual Care consult, as indicated  10/25/2023 0524 by Kennedi Snyder RN  Outcome: Not Progressing  10/24/2023 1747 by Ramy Moreno RN  Outcome: Progressing     Problem: Safety - Adult  Goal: Free from fall injury  10/25/2023 0524 by Kennedi Snyder RN  Outcome: Not Progressing  10/24/2023 1747 by Ramy Moreno RN  Outcome: Progressing  Flowsheets (Taken 10/24/2023 0700)  Free From Fall Injury: Instruct family/caregiver on patient safety     Problem: Chronic Conditions and Co-morbidities  Goal: Patient's chronic conditions and co-morbidity symptoms are monitored and maintained or improved  10/25/2023 0524 by Kennedi Snyder RN  Outcome: Not Progressing  10/24/2023 1747 by Ramy Moreno RN  Outcome: Progressing  Flowsheets (Taken 10/24/2023 0800)  Care Plan - Patient's Chronic Conditions and Co-Morbidity Symptoms are Monitored and Maintained or Improved:   Monitor and assess patient's chronic conditions and comorbid symptoms for stability, deterioration, or improvement   Collaborate with multidisciplinary team to address chronic and comorbid conditions and prevent exacerbation or deterioration   Update

## 2023-10-25 NOTE — DISCHARGE INSTRUCTIONS
Please follow-up with your primary care provider within 5 to 7 days for continued care. Continue taking Librium 10 mg two times in day for tomorrow, followed by 5 mg two times in day. Please continue using Folic acid and Thiamine. Please avoid using/drinking Alcohol. Your blood pressure medications have been stopped as your blood pressure remained stable in hospital without your medciatons. Plesea reach out to your PCP for further evaluation and management of your blood pressure. If you have been given medication please take them as prescribed. Do not take more medication than recommended at any given time. If you begin to experience any symptoms such as chest pain shortness of breath nausea vomiting dizziness drowsiness abdominal pain loss of consciousness or any other symptoms you find concerning please return to the ED for follow-up evaluation. Please feel free return to the hospital if your symptoms worsen or any new concerning symptoms develop. Follow-up with your primary care physician as needed for all other the concerns.

## 2023-10-26 ENCOUNTER — CARE COORDINATION (OUTPATIENT)
Dept: CASE MANAGEMENT | Age: 53
End: 2023-10-26

## 2023-10-26 LAB
MICROORGANISM SPEC CULT: NORMAL
MICROORGANISM SPEC CULT: NORMAL
SERVICE CMNT-IMP: NORMAL
SERVICE CMNT-IMP: NORMAL
SPECIMEN DESCRIPTION: NORMAL
SPECIMEN DESCRIPTION: NORMAL

## 2023-10-26 NOTE — PROGRESS NOTES
CLINICAL PHARMACY NOTE: MEDS TO BEDS    Total # of Prescriptions Filled: 4   The following medications were delivered to the patient:   Thiamine  Folic acid  Chlordiazepoxide 5  Chlordiazepoxide 10    Additional Documentation:   Writer did not deliver

## 2023-10-26 NOTE — CARE COORDINATION
Care Transitions Outreach Attempt # 1     Call within 2 business days of discharge: Yes   Attempted to reach patient for transitions of care follow up. Unable to reach patient. Mailbox full unable to leave a message. Left VM on daughter Naz VM requested a call back. Patient: Heidi Cha Patient : 1970 MRN: [id number]    Last Discharge Facility       Date Complaint Diagnosis Description Type Department Provider    10/21/23 Seizures Seizure ([address] [facility]) . .. ED to Hosp-Admission (Discharged) (ADMITTED) [facility] Dagmar Ramires MD; Nallely Smith,... Was this an external facility discharge?  No Discharge Facility Name: [facility]    Noted following upcoming appointments from discharge chart review:

## 2023-10-27 ENCOUNTER — CARE COORDINATION (OUTPATIENT)
Dept: CASE MANAGEMENT | Age: 53
End: 2023-10-27

## 2023-10-27 LAB — VIT B1 PYROPHOSHATE BLD-SCNC: 280 NMOL/L (ref 70–180)

## 2023-10-27 NOTE — CARE COORDINATION
Care Transitions Outreach Attempt #2    Call within 2 business days of discharge: Yes   Attempt #2 to reach patient for transitions of care follow up. Unable to reach patient. Left message requesting call back. CTN sign off if no return call received. Patient: Kasey Morales Patient : 1970 MRN: [id number]    Last Discharge Facility       Date Complaint Diagnosis Description Type Department Provider    10/21/23 Seizures Seizure ([address] ) . .. ED to Hosp-Admission (Discharged) (ADMITTED) [facility] Monroe Hernandez MD; Alberta Tran,... Was this an external facility discharge? No Discharge Facility Name: [facility]    Noted following upcoming appointments from discharge chart review:   [facility] follow up appointment(s): No future appointments. Shadi Brenner RN BSN [facility]  Care Transitions Nurse  [phone]   [email or web address]

## 2023-11-01 PROBLEM — F10.20 ALCOHOL USE DISORDER, SEVERE, DEPENDENCE (HCC): Status: ACTIVE | Noted: 2023-11-01

## 2023-11-02 NOTE — DISCHARGE SUMMARY
[address]     Department of Internal Medicine - Staff Internal Medicine Teaching Service    INPATIENT DISCHARGE SUMMARY      Patient Identification:  Reema Leong is a 45 y.o. female. :  1970  MRN: [id number]     Acct: [de-identified]   PCP: Ruth Ann Aguirre MD  Admit Date:  10/21/2023  Discharge date and time: 10/25/2023  5:05 PM   Attending Provider: No att. providers found                                     [address] [facility] Problem Lists:  Principal Problem:    Tonic-clonic seizure ([address])  Active Problems:    HTN (hypertension)    Altered mental status    Lactic acidosis    Alcohol abuse    Alcohol withdrawal syndrome without complication (HCC)    Hypokalemia    Seizure (HCC)    Nystagmus    Alcohol use disorder, severe, dependence ([address])  Resolved Problems:    * No resolved hospital problems. *      HOSPITAL STAY     Brief Inpatient course:   Reema Leong is a 45 y.o. female who was admitted for the management of Tonic-clonic seizure ([address]), presented to the emergency department with after witnessed seizure activity, she was apparently watching or manage 50 mg culture when she started mumbling and began shaking of both upper and lower extremities this lasted for a minute   .  On our evaluation, she was oriented to place,person but not time or about why she is in the hospital, did not seem to recollect anything about the event , was slightly confused   -had no tremors or did not seem to be withdrawing   -CNS examination was normal   -did not complain of aches or muscle pains, no tenderness on examination      In the ED, she was alert slightly confused  Blood pressure normotensive, tachycardic, afebrile, saturating on room air  -CBC showing no leukocytosis, hemoglobin 15.8, platelets 076  Blood gas, pH 7.45, PCO2 30, bicarb 21.5  Lactic acid 7.2  BMP sodium 137, potassium 3.2 chloride 95 CO2 18 anion gap 24 BUN 9 creatinine 0.8  - LFTs within normal

## 2024-04-15 ENCOUNTER — TELEPHONE (OUTPATIENT)
Dept: INTERNAL MEDICINE CLINIC | Age: 54
End: 2024-04-15

## 2025-02-14 NOTE — PROGRESS NOTES
Physical Therapy  Facility/Department: [address] STEPDOWN  Physical Therapy Initial Assessment    Name: Bravo Del Angel  : 1970  MRN: [id number]  Date of Service: 10/23/2023    The patient is a pleasant 45 y.o. female with a PMHx significant for      htn  Alcohol abuse    presents with a chief complaint of shaking of both upper and lower extremities that happened today evening (10/21),she was apparently watching foot ball match, sitting on her couch when she started mumbling and began had shaking of both upper and lower extremities which lasted for a minute, this episode was witnessed by her significant other who was present during our evaluation , she also had frothing from her mouth , she lost consciousness and regained after arrival of ems   He denied any urinary or bowel incontinence,she does not remember anything about the event , not able to recollect anything before the event   Discharge Recommendations:  Further therapy recommended at discharge. PT Equipment Recommendations  Equipment Needed: No (continue to assess; pt requesting quad cane; she may need a rwalker)      Patient Diagnosis(es): The primary encounter diagnosis was Seizure ([address]). A diagnosis of Alcohol withdrawal syndrome without complication (HCC) was also pertinent to this visit. Past Medical History:  has a past medical history of Asthma, Diabetes mellitus ([address]), HSV (herpes simplex virus) infection, HTN (hypertension), and Seasonal allergies. Past Surgical History:  has a past surgical history that includes  section (); Dilation and curettage of uterus; and sterilization (2007). Assessment   Pt cooperative, pleasantly confused, had trouble following verbal only commands, better with visual and tactile cues. Pt had trouble understanding to return to supine after ambulating with PT (she was thinking about going home after MD just told her she would be staying in the hospital another day).   Tactile cues for bed mob, transfers with min A, gait w/o device min A+1 x 110'. Body Structures, Functions, Activity Limitations Requiring Skilled Therapeutic Intervention: Decreased functional mobility ; Decreased safe awareness;Decreased cognition;Decreased balance;Decreased fine motor control;Decreased coordination; Increased pain;Decreased posture  Therapy Prognosis: Good  Decision Making: Medium Complexity  Barriers to Learning: decreased cognition/DTs  Requires PT Follow-Up: Yes  Activity Tolerance  Activity Tolerance: Treatment limited secondary to decreased cognition     Plan   Physcial Therapy Plan  General Plan:  (5-6 visits weekly)  Current Treatment Recommendations: Strengthening, ROM, Balance training, Functional mobility training, Transfer training, Endurance training, Gait training, Stair training, Neuromuscular re-education, Cognitive reorientation, Pain management, Home exercise program, Safety education & training, Patient/Caregiver education & training, Equipment evaluation, education, & procurement, Positioning, Therapeutic activities  Safety Devices  Type of Devices: Call light within reach, Bed alarm in place, Gait belt, Patient at risk for falls, Left in bed, Sitter present, Nurse notified, Carmen Rosales in use  Restraints  Restraints Initially in Place: No     Restrictions  Restrictions/Precautions  Restrictions/Precautions: Fall Risk, Up as Tolerated, Seizure  Required Braces or Orthoses?: No     Subjective   General  Patient assessed for rehabilitation services?: Yes  Response To Previous Treatment: Not applicable  Family / Caregiver Present: No  Follows Commands: Impaired (pt slow to respond, needs repetition, occasional visual/tactile cues)  Subjective  Subjective: denies pain at rest; states she has chronic R knee pain when ambulating         Social/Functional History  Social/Functional History  Lives With: Son  Type of Home: Apartment  Home Layout: One level  Home Access: Stairs to enter with rails  Entrance Consent: The patient's consent was obtained including but not limited to risks of crusting, scabbing, blistering, scarring, darker or lighter pigmentary change, recurrence, incomplete removal and infection. Render Post-Care Instructions In Note?: no Detail Level: Detailed Duration Of Freeze Thaw-Cycle (Seconds): 0 Show Applicator Variable?: Yes Post-Care Instructions: You have just had cryotherapy for the treatment of a pre-cancerous or other benign (non-cancerous) skin lesions. You can expect the pain to rapidly decrease over the next 45 minutes. The area treated will initially turn red and over the next 72 hours turn brown to black. A scab will form that will peel off by itself within 5 to 7 days. A blister or reddish purple blood blister may form where the area has been treated. When the scab forms, you can apply Vaseline or Scar Recovery Gel twice a day to the wound. This product will improve the healing of the wound, decreasing the appearance of red or pink pigment changes in the wound. The gel has also been shown to significantly decrease itching while the wound heals. You can purchase the Scar Recovery Gel at our office. \\n\\nCan I wash or use makeup? Yes\\n\\nShould I break the blister should one form? \\nIf the blister is bothersome, you may break the blister with a clean needle if the lesion is on the body. However, we do not recommend doing this for lesions on the face. Keep the area dry and as clean as possible in order to prevent infection. Natural skin is the best protection to prevent infection. \\n\\nWill this leave a scar? \\nIt could, but it is very unlikely. However, it may leave a slight discoloration, which should be temporary. \\n\\nWhat if the skin growth doesn't go away after this has healed? \\nThe providers treated this area believing it did not have cancerous roots and was strictly limited to the surface of the skin as a pre-cancerous or benign growth would be. The growth may not disappear or it may return over a period of time. You need to have this area checked again at your follow-up appointment to ensure that it does not need further treatment or that it has resolved.

## 2025-05-16 ENCOUNTER — HOSPITAL ENCOUNTER (INPATIENT)
Age: 55
LOS: 14 days | Discharge: NURSING, REHAB OR LONG-TERM CARE | DRG: 897 | End: 2025-05-30
Attending: STUDENT IN AN ORGANIZED HEALTH CARE EDUCATION/TRAINING PROGRAM | Admitting: PSYCHIATRY & NEUROLOGY
Payer: MEDICARE

## 2025-05-16 ENCOUNTER — APPOINTMENT (OUTPATIENT)
Dept: MRI IMAGING | Age: 55
DRG: 897 | End: 2025-05-16
Payer: MEDICARE

## 2025-05-16 ENCOUNTER — APPOINTMENT (OUTPATIENT)
Dept: GENERAL RADIOLOGY | Age: 55
DRG: 897 | End: 2025-05-16
Payer: MEDICARE

## 2025-05-16 ENCOUNTER — APPOINTMENT (OUTPATIENT)
Dept: CT IMAGING | Age: 55
DRG: 897 | End: 2025-05-16
Payer: MEDICARE

## 2025-05-16 DIAGNOSIS — R41.82 ALTERED MENTAL STATUS, UNSPECIFIED ALTERED MENTAL STATUS TYPE: Primary | ICD-10-CM

## 2025-05-16 DIAGNOSIS — E87.6 HYPOKALEMIA: ICD-10-CM

## 2025-05-16 DIAGNOSIS — E83.42 HYPOMAGNESEMIA: ICD-10-CM

## 2025-05-16 PROBLEM — G93.40 ACUTE ENCEPHALOPATHY: Status: ACTIVE | Noted: 2025-05-16

## 2025-05-16 LAB
ALBUMIN SERPL-MCNC: 4.1 G/DL (ref 3.5–5.2)
ALBUMIN/GLOB SERPL: 1.2 {RATIO} (ref 1–2.5)
ALP SERPL-CCNC: 82 U/L (ref 35–104)
ALT SERPL-CCNC: 29 U/L (ref 10–35)
AMMONIA PLAS-SCNC: 45 UMOL/L (ref 11–51)
AMPHET UR QL SCN: NEGATIVE
ANION GAP SERPL CALCULATED.3IONS-SCNC: 25 MMOL/L (ref 9–16)
APAP SERPL-MCNC: <5 UG/ML (ref 10–30)
AST SERPL-CCNC: 97 U/L (ref 10–35)
BACTERIA URNS QL MICRO: ABNORMAL
BARBITURATES UR QL SCN: NEGATIVE
BASOPHILS # BLD: <0.03 K/UL (ref 0–0.2)
BASOPHILS NFR BLD: 1 % (ref 0–2)
BENZODIAZ UR QL: NEGATIVE
BILIRUB SERPL-MCNC: 0.6 MG/DL (ref 0–1.2)
BILIRUB UR QL STRIP: NEGATIVE
BUN BLD-MCNC: <3 MG/DL (ref 8–26)
BUN SERPL-MCNC: 5 MG/DL (ref 6–20)
CA-I BLD-SCNC: 1.04 MMOL/L (ref 1.15–1.33)
CALCIUM SERPL-MCNC: 8.2 MG/DL (ref 8.6–10.4)
CANNABINOIDS UR QL SCN: NEGATIVE
CASTS #/AREA URNS LPF: ABNORMAL /LPF (ref 0–8)
CHLORIDE BLD-SCNC: 99 MMOL/L (ref 98–107)
CHLORIDE SERPL-SCNC: 92 MMOL/L (ref 98–107)
CLARITY UR: CLEAR
CO2 BLD CALC-SCNC: 23 MMOL/L (ref 22–30)
CO2 SERPL-SCNC: 18 MMOL/L (ref 20–31)
COCAINE UR QL SCN: NEGATIVE
COLOR UR: YELLOW
CREAT SERPL-MCNC: 0.7 MG/DL (ref 0.6–0.9)
EGFR, POC: >90 ML/MIN/1.73M2
EOSINOPHIL # BLD: <0.03 K/UL (ref 0–0.44)
EOSINOPHILS RELATIVE PERCENT: 0 % (ref 1–4)
EPI CELLS #/AREA URNS HPF: ABNORMAL /HPF (ref 0–5)
ERYTHROCYTE [DISTWIDTH] IN BLOOD BY AUTOMATED COUNT: 12.6 % (ref 11.8–14.4)
ETHANOL PERCENT: <0.01 %
ETHANOLAMINE SERPL-MCNC: <10 MG/DL (ref 0–0.08)
FENTANYL UR QL: NEGATIVE
GFR, ESTIMATED: >90 ML/MIN/1.73M2
GLUCOSE BLD-MCNC: 128 MG/DL (ref 74–100)
GLUCOSE BLD-MCNC: 94 MG/DL (ref 65–105)
GLUCOSE SERPL-MCNC: 113 MG/DL (ref 74–99)
GLUCOSE UR STRIP-MCNC: NEGATIVE MG/DL
HCO3 VENOUS: 24.3 MMOL/L (ref 22–29)
HCT VFR BLD AUTO: 39.4 % (ref 36.3–47.1)
HCT VFR BLD AUTO: 47 % (ref 36–46)
HGB BLD-MCNC: 14.4 G/DL (ref 11.9–15.1)
HGB UR QL STRIP.AUTO: ABNORMAL
IMM GRANULOCYTES # BLD AUTO: 0.04 K/UL (ref 0–0.3)
IMM GRANULOCYTES NFR BLD: 1 %
INR PPP: 1.1
KETONES UR STRIP-MCNC: NEGATIVE MG/DL
LACTIC ACID, SEPSIS WHOLE BLOOD: 2.1 MMOL/L (ref 0.5–1.9)
LACTIC ACID, SEPSIS WHOLE BLOOD: 9.3 MMOL/L (ref 0.5–1.9)
LEUKOCYTE ESTERASE UR QL STRIP: NEGATIVE
LYMPHOCYTES NFR BLD: 0.88 K/UL (ref 1.1–3.7)
LYMPHOCYTES RELATIVE PERCENT: 22 % (ref 24–43)
MAGNESIUM SERPL-MCNC: 1.5 MG/DL (ref 1.6–2.6)
MCH RBC QN AUTO: 35.6 PG (ref 25.2–33.5)
MCHC RBC AUTO-ENTMCNC: 36.5 G/DL (ref 28.4–34.8)
MCV RBC AUTO: 97.3 FL (ref 82.6–102.9)
METHADONE UR QL: NEGATIVE
MONOCYTES NFR BLD: 0.18 K/UL (ref 0.1–1.2)
MONOCYTES NFR BLD: 5 % (ref 3–12)
NEUTROPHILS NFR BLD: 71 % (ref 36–65)
NEUTS SEG NFR BLD: 2.79 K/UL (ref 1.5–8.1)
NITRITE UR QL STRIP: NEGATIVE
NRBC BLD-RTO: 0 PER 100 WBC
O2 SAT, VEN: 84.5 % (ref 60–85)
OPIATES UR QL SCN: NEGATIVE
OXYCODONE UR QL SCN: NEGATIVE
PCO2 VENOUS: 35.7 MM HG (ref 41–51)
PCP UR QL SCN: NEGATIVE
PH UR STRIP: 7 [PH] (ref 5–8)
PH VENOUS: 7.44 (ref 7.32–7.43)
PLATELET # BLD AUTO: 287 K/UL (ref 138–453)
PMV BLD AUTO: 9 FL (ref 8.1–13.5)
PO2 VENOUS: 47.1 MM HG (ref 30–50)
POC ANION GAP: 20 MMOL/L (ref 7–16)
POC CREATININE: 0.6 MG/DL (ref 0.51–1.19)
POC HEMOGLOBIN (CALC): 15.9 G/DL (ref 12–16)
POC LACTIC ACID: 8.5 MMOL/L (ref 0.56–1.39)
POSITIVE BASE EXCESS, VEN: 0.5 MMOL/L (ref 0–3)
POTASSIUM BLD-SCNC: 2 MMOL/L (ref 3.5–4.5)
POTASSIUM SERPL-SCNC: 2.4 MMOL/L (ref 3.7–5.3)
PROCALCITONIN SERPL-MCNC: 0.03 NG/ML (ref 0–0.09)
PROT SERPL-MCNC: 7.4 G/DL (ref 6.6–8.7)
PROT UR STRIP-MCNC: NEGATIVE MG/DL
PROTHROMBIN TIME: 14.6 SEC (ref 11.7–14.9)
RBC # BLD AUTO: 4.05 M/UL (ref 3.95–5.11)
RBC #/AREA URNS HPF: ABNORMAL /HPF (ref 0–4)
SALICYLATES SERPL-MCNC: <0.5 MG/DL (ref 0–10)
SODIUM BLD-SCNC: 141 MMOL/L (ref 138–146)
SODIUM SERPL-SCNC: 135 MMOL/L (ref 136–145)
SP GR UR STRIP: 1.02 (ref 1–1.03)
TEST INFORMATION: NORMAL
TSH SERPL DL<=0.05 MIU/L-ACNC: 1.53 UIU/ML (ref 0.27–4.2)
UROBILINOGEN UR STRIP-ACNC: NORMAL EU/DL (ref 0–1)
WBC #/AREA URNS HPF: ABNORMAL /HPF (ref 0–5)
WBC OTHER # BLD: 3.9 K/UL (ref 3.5–11.3)

## 2025-05-16 PROCEDURE — 70496 CT ANGIOGRAPHY HEAD: CPT

## 2025-05-16 PROCEDURE — 87040 BLOOD CULTURE FOR BACTERIA: CPT

## 2025-05-16 PROCEDURE — 80051 ELECTROLYTE PANEL: CPT

## 2025-05-16 PROCEDURE — G0480 DRUG TEST DEF 1-7 CLASSES: HCPCS

## 2025-05-16 PROCEDURE — 2500000003 HC RX 250 WO HCPCS

## 2025-05-16 PROCEDURE — 85014 HEMATOCRIT: CPT

## 2025-05-16 PROCEDURE — 96361 HYDRATE IV INFUSION ADD-ON: CPT

## 2025-05-16 PROCEDURE — 70450 CT HEAD/BRAIN W/O DYE: CPT

## 2025-05-16 PROCEDURE — 93005 ELECTROCARDIOGRAM TRACING: CPT

## 2025-05-16 PROCEDURE — 6360000004 HC RX CONTRAST MEDICATION

## 2025-05-16 PROCEDURE — 81001 URINALYSIS AUTO W/SCOPE: CPT

## 2025-05-16 PROCEDURE — 99223 1ST HOSP IP/OBS HIGH 75: CPT | Performed by: PSYCHIATRY & NEUROLOGY

## 2025-05-16 PROCEDURE — 2580000003 HC RX 258: Performed by: STUDENT IN AN ORGANIZED HEALTH CARE EDUCATION/TRAINING PROGRAM

## 2025-05-16 PROCEDURE — 82565 ASSAY OF CREATININE: CPT

## 2025-05-16 PROCEDURE — 99285 EMERGENCY DEPT VISIT HI MDM: CPT

## 2025-05-16 PROCEDURE — 82803 BLOOD GASES ANY COMBINATION: CPT

## 2025-05-16 PROCEDURE — 83735 ASSAY OF MAGNESIUM: CPT

## 2025-05-16 PROCEDURE — 84443 ASSAY THYROID STIM HORMONE: CPT

## 2025-05-16 PROCEDURE — 96376 TX/PRO/DX INJ SAME DRUG ADON: CPT

## 2025-05-16 PROCEDURE — 83605 ASSAY OF LACTIC ACID: CPT

## 2025-05-16 PROCEDURE — 80307 DRUG TEST PRSMV CHEM ANLYZR: CPT

## 2025-05-16 PROCEDURE — 6360000002 HC RX W HCPCS

## 2025-05-16 PROCEDURE — 80143 DRUG ASSAY ACETAMINOPHEN: CPT

## 2025-05-16 PROCEDURE — 2580000003 HC RX 258

## 2025-05-16 PROCEDURE — 82140 ASSAY OF AMMONIA: CPT

## 2025-05-16 PROCEDURE — 82330 ASSAY OF CALCIUM: CPT

## 2025-05-16 PROCEDURE — 96374 THER/PROPH/DIAG INJ IV PUSH: CPT

## 2025-05-16 PROCEDURE — 71045 X-RAY EXAM CHEST 1 VIEW: CPT

## 2025-05-16 PROCEDURE — 84520 ASSAY OF UREA NITROGEN: CPT

## 2025-05-16 PROCEDURE — 80179 DRUG ASSAY SALICYLATE: CPT

## 2025-05-16 PROCEDURE — 84145 PROCALCITONIN (PCT): CPT

## 2025-05-16 PROCEDURE — 85610 PROTHROMBIN TIME: CPT

## 2025-05-16 PROCEDURE — 96366 THER/PROPH/DIAG IV INF ADDON: CPT

## 2025-05-16 PROCEDURE — 96365 THER/PROPH/DIAG IV INF INIT: CPT

## 2025-05-16 PROCEDURE — 82947 ASSAY GLUCOSE BLOOD QUANT: CPT

## 2025-05-16 PROCEDURE — 85025 COMPLETE CBC W/AUTO DIFF WBC: CPT

## 2025-05-16 PROCEDURE — 84425 ASSAY OF VITAMIN B-1: CPT

## 2025-05-16 PROCEDURE — 2060000000 HC ICU INTERMEDIATE R&B

## 2025-05-16 PROCEDURE — 70551 MRI BRAIN STEM W/O DYE: CPT

## 2025-05-16 PROCEDURE — 80053 COMPREHEN METABOLIC PANEL: CPT

## 2025-05-16 PROCEDURE — 74018 RADEX ABDOMEN 1 VIEW: CPT

## 2025-05-16 RX ORDER — POTASSIUM CHLORIDE 7.45 MG/ML
10 INJECTION INTRAVENOUS PRN
Status: DISCONTINUED | OUTPATIENT
Start: 2025-05-16 | End: 2025-05-30 | Stop reason: HOSPADM

## 2025-05-16 RX ORDER — ONDANSETRON 4 MG/1
4 TABLET, ORALLY DISINTEGRATING ORAL EVERY 8 HOURS PRN
Status: DISCONTINUED | OUTPATIENT
Start: 2025-05-16 | End: 2025-05-30 | Stop reason: HOSPADM

## 2025-05-16 RX ORDER — SODIUM CHLORIDE 0.9 % (FLUSH) 0.9 %
5-40 SYRINGE (ML) INJECTION PRN
Status: DISCONTINUED | OUTPATIENT
Start: 2025-05-16 | End: 2025-05-30 | Stop reason: HOSPADM

## 2025-05-16 RX ORDER — THIAMINE HYDROCHLORIDE 100 MG/ML
500 INJECTION, SOLUTION INTRAMUSCULAR; INTRAVENOUS ONCE
Status: COMPLETED | OUTPATIENT
Start: 2025-05-16 | End: 2025-05-16

## 2025-05-16 RX ORDER — SODIUM CHLORIDE 0.9 % (FLUSH) 0.9 %
5-40 SYRINGE (ML) INJECTION EVERY 12 HOURS SCHEDULED
Status: DISCONTINUED | OUTPATIENT
Start: 2025-05-16 | End: 2025-05-30 | Stop reason: HOSPADM

## 2025-05-16 RX ORDER — IOPAMIDOL 755 MG/ML
75 INJECTION, SOLUTION INTRAVASCULAR
Status: COMPLETED | OUTPATIENT
Start: 2025-05-16 | End: 2025-05-16

## 2025-05-16 RX ORDER — ONDANSETRON 2 MG/ML
4 INJECTION INTRAMUSCULAR; INTRAVENOUS EVERY 6 HOURS PRN
Status: DISCONTINUED | OUTPATIENT
Start: 2025-05-16 | End: 2025-05-30 | Stop reason: HOSPADM

## 2025-05-16 RX ORDER — 0.9 % SODIUM CHLORIDE 0.9 %
1000 INTRAVENOUS SOLUTION INTRAVENOUS ONCE
Status: COMPLETED | OUTPATIENT
Start: 2025-05-16 | End: 2025-05-16

## 2025-05-16 RX ORDER — SODIUM CHLORIDE 9 MG/ML
INJECTION, SOLUTION INTRAVENOUS CONTINUOUS
Status: ACTIVE | OUTPATIENT
Start: 2025-05-16 | End: 2025-05-16

## 2025-05-16 RX ORDER — LORAZEPAM 2 MG/ML
1 INJECTION INTRAMUSCULAR EVERY 5 MIN PRN
Status: DISCONTINUED | OUTPATIENT
Start: 2025-05-16 | End: 2025-05-30 | Stop reason: HOSPADM

## 2025-05-16 RX ORDER — POLYETHYLENE GLYCOL 3350 17 G/17G
17 POWDER, FOR SOLUTION ORAL DAILY PRN
Status: DISCONTINUED | OUTPATIENT
Start: 2025-05-16 | End: 2025-05-30 | Stop reason: HOSPADM

## 2025-05-16 RX ORDER — MAGNESIUM SULFATE IN WATER 40 MG/ML
2000 INJECTION, SOLUTION INTRAVENOUS ONCE
Status: COMPLETED | OUTPATIENT
Start: 2025-05-16 | End: 2025-05-16

## 2025-05-16 RX ORDER — MAGNESIUM SULFATE IN WATER 40 MG/ML
2000 INJECTION, SOLUTION INTRAVENOUS PRN
Status: DISCONTINUED | OUTPATIENT
Start: 2025-05-16 | End: 2025-05-30 | Stop reason: HOSPADM

## 2025-05-16 RX ORDER — ALBUTEROL SULFATE 90 UG/1
2 INHALANT RESPIRATORY (INHALATION) EVERY 6 HOURS PRN
Status: DISCONTINUED | OUTPATIENT
Start: 2025-05-16 | End: 2025-05-30 | Stop reason: HOSPADM

## 2025-05-16 RX ORDER — POTASSIUM CHLORIDE 7.45 MG/ML
10 INJECTION INTRAVENOUS
Status: DISPENSED | OUTPATIENT
Start: 2025-05-16 | End: 2025-05-16

## 2025-05-16 RX ORDER — ENOXAPARIN SODIUM 100 MG/ML
40 INJECTION SUBCUTANEOUS DAILY
Status: DISCONTINUED | OUTPATIENT
Start: 2025-05-16 | End: 2025-05-30 | Stop reason: HOSPADM

## 2025-05-16 RX ORDER — ACETAMINOPHEN 650 MG/1
650 SUPPOSITORY RECTAL EVERY 6 HOURS PRN
Status: DISCONTINUED | OUTPATIENT
Start: 2025-05-16 | End: 2025-05-30 | Stop reason: HOSPADM

## 2025-05-16 RX ORDER — ACETAMINOPHEN 325 MG/1
650 TABLET ORAL EVERY 6 HOURS PRN
Status: DISCONTINUED | OUTPATIENT
Start: 2025-05-16 | End: 2025-05-30 | Stop reason: HOSPADM

## 2025-05-16 RX ORDER — POTASSIUM CHLORIDE 1500 MG/1
40 TABLET, EXTENDED RELEASE ORAL PRN
Status: DISCONTINUED | OUTPATIENT
Start: 2025-05-16 | End: 2025-05-30 | Stop reason: HOSPADM

## 2025-05-16 RX ORDER — ASPIRIN 81 MG/1
81 TABLET, CHEWABLE ORAL DAILY
Status: DISCONTINUED | OUTPATIENT
Start: 2025-05-16 | End: 2025-05-17

## 2025-05-16 RX ORDER — 0.9 % SODIUM CHLORIDE 0.9 %
1500 INTRAVENOUS SOLUTION INTRAVENOUS ONCE
Status: COMPLETED | OUTPATIENT
Start: 2025-05-16 | End: 2025-05-16

## 2025-05-16 RX ORDER — SODIUM CHLORIDE 9 MG/ML
INJECTION, SOLUTION INTRAVENOUS PRN
Status: DISCONTINUED | OUTPATIENT
Start: 2025-05-16 | End: 2025-05-30 | Stop reason: HOSPADM

## 2025-05-16 RX ORDER — FOLIC ACID 5 MG/ML
1 INJECTION, SOLUTION INTRAMUSCULAR; INTRAVENOUS; SUBCUTANEOUS ONCE
Status: DISCONTINUED | OUTPATIENT
Start: 2025-05-16 | End: 2025-05-18

## 2025-05-16 RX ORDER — SODIUM CHLORIDE 9 MG/ML
INJECTION, SOLUTION INTRAVENOUS CONTINUOUS
Status: DISCONTINUED | OUTPATIENT
Start: 2025-05-16 | End: 2025-05-22

## 2025-05-16 RX ORDER — LIDOCAINE HYDROCHLORIDE 10 MG/ML
20 INJECTION, SOLUTION INFILTRATION; PERINEURAL ONCE
Status: COMPLETED | OUTPATIENT
Start: 2025-05-16 | End: 2025-05-17

## 2025-05-16 RX ADMIN — ACYCLOVIR SODIUM 700 MG: 50 INJECTION, SOLUTION INTRAVENOUS at 18:02

## 2025-05-16 RX ADMIN — POTASSIUM CHLORIDE 10 MEQ: 7.45 INJECTION INTRAVENOUS at 16:32

## 2025-05-16 RX ADMIN — POTASSIUM CHLORIDE 10 MEQ: 7.45 INJECTION INTRAVENOUS at 20:00

## 2025-05-16 RX ADMIN — SODIUM CHLORIDE, PRESERVATIVE FREE 10 ML: 5 INJECTION INTRAVENOUS at 19:39

## 2025-05-16 RX ADMIN — SODIUM CHLORIDE 1500 ML: 9 INJECTION, SOLUTION INTRAVENOUS at 13:00

## 2025-05-16 RX ADMIN — SODIUM CHLORIDE 1000 ML: 9 INJECTION, SOLUTION INTRAVENOUS at 10:35

## 2025-05-16 RX ADMIN — POTASSIUM CHLORIDE 10 MEQ: 7.45 INJECTION INTRAVENOUS at 12:52

## 2025-05-16 RX ADMIN — MAGNESIUM SULFATE HEPTAHYDRATE 2000 MG: 40 INJECTION, SOLUTION INTRAVENOUS at 11:03

## 2025-05-16 RX ADMIN — IOPAMIDOL 75 ML: 755 INJECTION, SOLUTION INTRAVENOUS at 10:47

## 2025-05-16 RX ADMIN — POTASSIUM CHLORIDE 10 MEQ: 7.45 INJECTION INTRAVENOUS at 13:46

## 2025-05-16 RX ADMIN — POTASSIUM CHLORIDE 10 MEQ: 7.45 INJECTION INTRAVENOUS at 15:39

## 2025-05-16 RX ADMIN — THIAMINE HYDROCHLORIDE 500 MG: 100 INJECTION, SOLUTION INTRAMUSCULAR; INTRAVENOUS at 11:04

## 2025-05-16 NOTE — ED NOTES
Pt resting in bed with personal items and call light within reach. No acute distress noted. Resp even and non labored. Monitor continued. IV fluids infusing. Will continue with plan of care.

## 2025-05-16 NOTE — ED NOTES
Per ED attending Dr. Reyes. Pause potassium replacement and administer acyclovir. Acyclovir started.

## 2025-05-16 NOTE — ED NOTES
Pt resting in bed with personal items and call light within reach. Sitter at bedside to ensure safety of patient. Pt still remains alert but disoriented. Pt states her name and where she is at but cannot recall the year or current president. VSS. No acute distress noted. Monitor continued. Potassium replacement continues to infuse.

## 2025-05-16 NOTE — ED PROVIDER NOTES
[facility]   Emergency Department  Emergency Medicine Attending Sign-out   Note started: 4:10 PM EDT    Care of Purvi Ray was assumed from previous attending Dr. Mares at 4 PM and is being seen for Altered Mental Status  .  The patient's initial evaluation and plan have been discussed with the prior provider who initially evaluated the patient.     Attestation  I was available and discussed any additional care issues that arose and coordinated the management plans with the resident(s) caring for the patient during my duty period. Any areas of disagreement with resident's documentation of care or procedures are noted on the chart. I was personally present for the key portions of any/all procedures, during my duty period. I have documented in the chart those procedures where I was not present during the key portions.     BRIEF PATIENT SUMMARY/MDM COURSE PER INITIAL PROVIDER:   RECENT VITALS:     Temp: 98.6 °F (37 °C),  Pulse: (!) 101, Respirations: 11, BP: (!) 152/96, SpO2: 100 %    This patient is a 54 y.o. Female with history of alcohol abuse who was found on the side of the street altered by EMS.  Upon arrival here she was alert looking around but had significant expressive aphasia and dysarthria.  Otherwise had nonfocal exam.  Concern for CVA and was evaluated for stroke.  However CT head and CTA were negative.  Initial lactic was 9, now in the 2 range.  Therefore there was concern for possible seizure activity and given that her alcohol was negative, possible alcohol withdrawal.  Potassium also found to be 2.4.  Had MRI performed that just resulted showing hyperattenuation concern for possible HSV encephalitis    DIAGNOSTICS/MEDICATIONS:     MEDICATIONS GIVEN:  ED Medication Orders (From admission, onward)      Start Ordered     Status Ordering Provider    05/16/25 1600 05/16/25 1552  acyclovir (ZOVIRAX) 1,000 mg in sodium chloride 0.9 % 250 mL IVPB  ONCE        Question:   carotid arteries. Atherosclerosis of the right cervical ICA.  No significant stenosis of the internal carotid arteries by NASCET criteria.  No evidence of a dissection. VERTEBRAL ARTERIES: No significant stenosis or dissection is seen of the vertebral arteries. SOFT TISSUES: No focal consolidation within the visualized lung apices.  No acute abnormality within the visualized superior mediastinum. BONES: No acute osseous abnormality. CTA HEAD: ANTERIOR CIRCULATION: No significant stenosis of the intracranial internal carotid, anterior cerebral, or middle cerebral arteries. No aneurysm. POSTERIOR CIRCULATION: No significant stenosis of the basilar or posterior cerebral arteries. No aneurysm. BRAIN: No mass effect or midline shift.     1. No flow limiting stenosis or dissection of the cervical carotid/vertebral arteries. 2. No significant stenosis or large vessel occlusion of the [id number]-pb-Bilbyh.     CT HEAD WO CONTRAST  Result Date: 5/16/2025  EXAMINATION: CT OF THE HEAD WITHOUT CONTRAST  5/16/2025 10:36 am TECHNIQUE: CT of the head was performed without the administration of intravenous contrast. Automated exposure control, iterative reconstruction, and/or weight based adjustment of the mA/kV was utilized to reduce the radiation dose to as low as reasonably achievable. COMPARISON: CT head 10/21/2023 HISTORY: ORDERING SYSTEM PROVIDED HISTORY: dysarthria TECHNOLOGIST PROVIDED HISTORY: Dysarthria Decision Support Exception - unselect if not a suspected or confirmed emergency medical condition->Emergency Medical Condition (MA) FINDINGS: BRAIN/VENTRICLES: There is no acute intracranial hemorrhage, mass effect or midline shift.  No abnormal extra-axial fluid collection.  The gray-white differentiation is maintained without evidence of an acute infarct.  Mild generalized cerebral volume loss with associated prominence of the ventricles.  There is no evidence of hydrocephalus. ORBITS: The visualized portion of the orbits

## 2025-05-16 NOTE — CARE COORDINATION
Case Management Assessment  Initial Evaluation    Date/Time of Evaluation: 5/16/2025 3:45 PM  Assessment Completed by: GEO GARCIA RN    If patient is discharged prior to next notation, then this note serves as note for discharge by case management.    Patient Name: Purvi Ray                   YOB: 1970  Diagnosis: No admission diagnoses are documented for this encounter.                   Date / Time: 5/16/2025 10:13 AM    Patient Admission Status: Emergency   Readmission Risk (Low < 19, Mod (19-27), High > 27): No data recorded  Current PCP: Rebecca Leong MD  PCP verified by CM? Yes    Chart Reviewed: Yes      History Provided by: Patient  Patient Orientation: Alert and Oriented    Patient Cognition: Alert    Hospitalization in the last 30 days (Readmission):  No    If yes, Readmission Assessment in  Navigator will be completed.    Advance Directives:      Code Status: Prior   Patient's Primary Decision Maker is:        Discharge Planning:    Patient lives with: Alone Type of Home: Apartment  Primary Care Giver: Self  Patient Support Systems include: Family Members   Current Financial resources:    Current community resources:    Current services prior to admission: None            Current DME:              Type of Home Care services:  None    ADLS  Prior functional level: Independent in ADLs/IADLs  Current functional level: Independent in ADLs/IADLs    PT AM-PAC:   /24  OT AM-PAC:   /24    Family can provide assistance at DC: No  Would you like Case Management to discuss the discharge plan with any other family members/significant others, and if so, who? No  Plans to Return to Present Housing: Unknown at present  Other Identified Issues/Barriers to RETURNING to current housing: safety  Potential Assistance needed at discharge: Transportation, Transitional Care            Potential DME:    Patient expects to discharge to: Apartment  Plan for transportation at discharge:

## 2025-05-16 NOTE — CONSULTS
Stroke Neurology Consult Note  Stroke Alert @ 10:21 am  Arrival at bedside @ 10:21 am    Reason for Consult:  ED Stroke Alert  Requesting Physician:  ED team   Endovascular Neurosurgeon: Golden Merino MD  Stroke Team: Golden Merino MD  History Obtained From:  electronic medical record  Chief Complaint:  Acute neurological deficits   Allergies:  Environmental/seasonal    History of Presenting Illness     Purvi Ray is a 54 y.o.  female with a history of HTN, DM, HSV, alcohol related seizures, who presents with altered mental status was found confused on the street with a bottle of vodka next to her.     Her last known well is not available at time of evaluation, EMS were called as patient was found confused. On my evaluation, patient is alert, not following commands, seems confused, not answering questions appropriate, speech is fluent but lacking comprehension. No clear dysarthria was noted. Was able to hold upper extremities antigravity and withdraws to painful stimuli on lower extremities.    LKW: unknown   NIHSS: 8  Modified Sweta Scale: pre morbid N/A  SBP: 150s  Glucose: 128  CT head without contrast: no acute abnormalities  TNK: unknown LKW  Endovascular: not indicated     On re-eval after CT scan, patient was able to tell me her age, speech seemed to be improving slightly, moving all extremities and no clear focal weakness, follows simple commands.       Review of Systems  Unable to obtain due mental status changes     Past Histories          Diagnosis Date    Asthma     Diabetes mellitus (HCC)     on no medications    HSV (herpes simplex virus) infection     HTN (hypertension)     Seasonal allergies          Procedure Laterality Date     SECTION  2006    DILATION AND CURETTAGE OF UTERUS      x 2    STERILIZATION  2007    hysteroscopic blockage of left fallopian tube     Social History     Socioeconomic History    Marital status: Single     Spouse name: Not on file    Number of children:

## 2025-05-16 NOTE — CODE DOCUMENTATION
Stroke alert non critical activated. Pt appears to be intoxicated actively slurring speech and unable to follow some commands.

## 2025-05-16 NOTE — ED PROVIDER NOTES
[facility]     Emergency Department     Faculty Attestation    I performed a history and physical examination of the patient and discussed management with the resident. I have reviewed and agree with the resident’s findings including all diagnostic interpretations, and treatment plans as written at the time of my review. Any areas of disagreement are noted on the chart. I was personally present for the key portions of any procedures. I have documented in the chart those procedures where I was not present during the key portions. For Physician Assistant/ Nurse Practitioner cases/documentation I have personally evaluated this patient and have completed at least one if not all key elements of the E/M (history, physical exam, and MDM). Additional findings are as noted.    PtName: Purvi Ray  MRN: [id number]  Birthdate 1970  Date of evaluation: 5/16/25  Note Started: 10:26 AM EDT    Primary Care Physician: Rebecca Leong MD    Brief HPI:  Patient is a 54-year-old female who was found outside by EMS with confusion/altered mental status.  Patient has history of alcohol use disorder.  Also has history of alcohol withdrawal seizures.  The patient is alert however she is unable to answer questions she is confused and her speech is abnormal.    Pertinent Physical Exam Findings:  Vitals:    05/16/25 1020   BP: (!) 153/74   Pulse: (!) 131   Resp: 18   SpO2: 97%   Aphasic and dysarthric, otherwise no obvious focal neurologic deficits.  Tachycardic rate, regular rhythm, breathing is even and unlabored.     Medical Decision Making: Patient is a 54 y.o. female presenting to the emergency department with confusion. The chart was reviewed for pertinent history relating to the chief complaint.  The patient appears confused, she is both aphasic and dysarthric.  She is alert.  She is tachycardic and hypertensive.  There is no last known well for the patient.  Given that she is

## 2025-05-16 NOTE — ED NOTES
Pt got up again and pulled another IV out. ED coordinators notified and patient placed in room closer to the nursing station for observation.

## 2025-05-16 NOTE — ED NOTES
Pt got up[ put of bed without calling out. Pt subsequently ripped both IV's out. Pt placed back in bed and new IV started. Will continue with plan of care.

## 2025-05-16 NOTE — H&P
[facility] Neurology   IN-PATIENT SERVICE   [facility]    HISTORY AND PHYSICAL EXAMINATION            Date:   5/16/2025  Patient name:  Purvi Ray  Date of admission:  5/16/2025 10:13 AM  MRN:   [id number]  Account:  [id number]  YOB: 1970  PCP:    Rebecca Leong MD  Room:   10/10  Code Status:    Prior    Chief Complaint:     Chief Complaint   Patient presents with    Altered Mental Status       History Obtained From:     EMR    History of Present Illness:     Purvi Ray is a 54 y.o.  female with a history of HTN, DM, HSV, alcohol related seizures, who presents with altered mental status was found confused on the street with a bottle of vodka next to her.      Her last known well is not available at time of evaluation, EMS were called as patient was found confused. On my evaluation, patient is alert, not following commands, seems confused, not answering questions appropriate, speech is fluent but lacking comprehension. No clear dysarthria was noted. Was able to hold upper extremities antigravity and withdraws to painful stimuli on lower extremities.    LKW: unknown   NIHSS: 8  Modified Sweta Scale: pre morbid N/A  SBP: 150s  Glucose: 128  CT head without contrast: no acute abnormalities  TNK: unknown LKW  Endovascular: not indicated      On re-eval after CT scan, patient was able to tell me her age, speech seemed to be improving slightly, moving all extremities and no clear focal weakness, follows simple commands.     Etoh levels were low     MRI brain obtained - High signal in the medial aspect of the temporal lobes bilaterally. Artifact vs herpes encephalitis     LP to be completed in ED. Was started on broad spectrum Atbx     Was found to have hypomagnesemia and hypokalemia both were replaced,thiamin IV was given       Past Medical History:     Past Medical History:   Diagnosis Date    Asthma     Diabetes mellitus (HCC)     on no medications    HSV (herpes simplex

## 2025-05-16 NOTE — ED NOTES
ED to inpatient nurses report      Chief Complaint:  Chief Complaint   Patient presents with    Altered Mental Status     Present to ED from: Home     MOA:     LOC: alert to only name  Mobility: Requires assistance * 2  Oxygen Baseline: % room air     Current needs required: MRI   Pending ED orders: 2nd lactic   Present condition: Stable     Why did the patient come to the ED? Altered mental. EMS reported ETOH but ethanol is <.01. Pt potassium 2.4 currently receiving bags of potassium IV. CT showed no abnormalities. Plan for MRI   What is the plan? Admit to inpatient  Any procedures or intervention occur? N/a  Any safety concerns?? N/a    Mental Status:  Level of Consciousness: Alert (0)    Psych Assessment:   Psychosocial  Psychosocial (WDL): Exceptions to WDL (Slurring words. unable to follow commands)  Vital signs   Vitals:    05/16/25 1110 05/16/25 1140 05/16/25 1202 05/16/25 1246   BP:  (!) 173/82  102/82   Pulse:  (!) 116 (!) 110 (!) 108   Resp:  19 15 16   Temp: 98.6 °F (37 °C)      TempSrc: Axillary      SpO2:   99% 100%        Vitals:  Patient Vitals for the past 24 hrs:   BP Temp Temp src Pulse Resp SpO2   05/16/25 1246 102/82 -- -- (!) 108 16 100 %   05/16/25 1202 -- -- -- (!) 110 15 99 %   05/16/25 1140 (!) 173/82 -- -- (!) 116 19 --   05/16/25 1110 -- 98.6 °F (37 °C) Axillary -- -- --   05/16/25 1020 (!) 153/74 -- -- (!) 131 18 97 %      Visit Vitals  /82   Pulse (!) 108   Temp 98.6 °F (37 °C) (Axillary)   Resp 16   SpO2 100%        LDAs:   Peripheral IV 05/16/25 Right Antecubital (Active)       Peripheral IV 05/16/25 Left;Anterior Forearm (Active)       Ambulatory Status:  Presents to emergency department  because of falls (Syncope, seizure, or loss of consciousness): Yes, Age > 70: No, Altered Mental Status, Intoxication with alcohol or substance confusion (Disorientation, impaired judgment, poor safety awaremess, or inability to follow instructions): Yes, Impaired Mobility: Ambulates or

## 2025-05-16 NOTE — PROGRESS NOTES
[facility]    PATIENT NAME: Purvi Ray    Shift date: 05/16/2025  Shift day: Friday   Shift # 1    Room # 08/08     Name: Purvi Ray            Age: 54 y.o.  Gender: female          Islam: Apostolic   Place of Muslim:     Trauma/Incident type: Stroke Alert  Admit Date & Time: 5/16/2025 10:13 AM  TRAUMA NAME: None    ADVANCE DIRECTIVES IN CHART?  No    NAME OF DECISION MAKER:     RELATIONSHIP OF DECISION MAKER TO PATIENT:     PATIENT/EVENT DESCRIPTION:  Purvi Ray is a 54 y.o. female who arrived via ground ambulance as stroke alert. Patient to be admitted to 08/08.       SPIRITUAL ASSESSMENT-INTERVENTION-OUTCOME:  No spiritual assessment was carried out because patient was a bit confused. Family was not present at the time. However, patient did say family knew she came to [facility].  provided ministry of presence, offered support and prayed with patient.  called patient's daughter, Naz, at [phone] and notified her. Naz said she never knew that patient was at the hospital. Naz said she was in [place] and  gave her ED phone number to call for updates on patient's condition.       PATIENT BELONGINGS:  No belongings noted    ANY BELONGINGS OF SIGNIFICANT VALUE NOTED:  Unknown    REGISTRATION STAFF NOTIFIED?  Yes    WHAT IS YOUR SPIRITUAL CARE PLAN FOR THIS PATIENT?:  Follow up visits recommended for ongoing assessment of patient's condition and for more spiritual and emotional support.     Electronically signed by Chaplain Ramila, on 5/16/2025 at 11:37 AM.  [facility]  [phone]

## 2025-05-16 NOTE — ED NOTES
Pt presents to ED room 8 via EMS as altered mental status and possible alcohol intoxication. EMS states maybe a pint of vodka was found empty and patient was altered on arrival. On arrival here to the ED patient is verbal but words are slurred and patient is unable to follow commands. Airway maintained. Pt is tachycardic and hypotensive. IV established. EKG obtained. Stroke alert non critical paged. Will continue with plan of care

## 2025-05-17 LAB
ANION GAP SERPL CALCULATED.3IONS-SCNC: 14 MMOL/L (ref 9–16)
BASOPHILS # BLD: <0.03 K/UL (ref 0–0.2)
BASOPHILS NFR BLD: 0 % (ref 0–2)
BUN SERPL-MCNC: 2 MG/DL (ref 6–20)
CALCIUM SERPL-MCNC: 8.2 MG/DL (ref 8.6–10.4)
CHLORIDE SERPL-SCNC: 104 MMOL/L (ref 98–107)
CO2 SERPL-SCNC: 22 MMOL/L (ref 20–31)
CREAT SERPL-MCNC: 0.5 MG/DL (ref 0.6–0.9)
CRP SERPL HS-MCNC: 5.8 MG/L (ref 0–5)
EKG ATRIAL RATE: 128 BPM
EKG P AXIS: 45 DEGREES
EKG P-R INTERVAL: 128 MS
EKG Q-T INTERVAL: 382 MS
EKG QRS DURATION: 66 MS
EKG QTC CALCULATION (BAZETT): 557 MS
EKG R AXIS: -15 DEGREES
EKG T AXIS: 38 DEGREES
EKG VENTRICULAR RATE: 128 BPM
EOSINOPHIL # BLD: 0.05 K/UL (ref 0–0.44)
EOSINOPHILS RELATIVE PERCENT: 1 % (ref 1–4)
ERYTHROCYTE [DISTWIDTH] IN BLOOD BY AUTOMATED COUNT: 12.9 % (ref 11.8–14.4)
ERYTHROCYTE [SEDIMENTATION RATE] IN BLOOD BY PHOTOMETRIC METHOD: 13 MM/HR (ref 0–30)
FOLATE SERPL-MCNC: 9.6 NG/ML (ref 4.8–24.2)
GFR, ESTIMATED: >90 ML/MIN/1.73M2
GLUCOSE BLD-MCNC: 103 MG/DL (ref 65–105)
GLUCOSE SERPL-MCNC: 92 MG/DL (ref 74–99)
HCT VFR BLD AUTO: 38.6 % (ref 36.3–47.1)
HGB BLD-MCNC: 13.7 G/DL (ref 11.9–15.1)
IMM GRANULOCYTES # BLD AUTO: <0.03 K/UL (ref 0–0.3)
IMM GRANULOCYTES NFR BLD: 0 %
LYMPHOCYTES NFR BLD: 1.07 K/UL (ref 1.1–3.7)
LYMPHOCYTES RELATIVE PERCENT: 21 % (ref 24–43)
MAGNESIUM SERPL-MCNC: 2 MG/DL (ref 1.6–2.6)
MCH RBC QN AUTO: 34.9 PG (ref 25.2–33.5)
MCHC RBC AUTO-ENTMCNC: 35.5 G/DL (ref 28.4–34.8)
MCV RBC AUTO: 98.5 FL (ref 82.6–102.9)
MONOCYTES NFR BLD: 0.27 K/UL (ref 0.1–1.2)
MONOCYTES NFR BLD: 5 % (ref 3–12)
NEUTROPHILS NFR BLD: 73 % (ref 36–65)
NEUTS SEG NFR BLD: 3.71 K/UL (ref 1.5–8.1)
NRBC BLD-RTO: 0 PER 100 WBC
PLATELET # BLD AUTO: 259 K/UL (ref 138–453)
PMV BLD AUTO: 9 FL (ref 8.1–13.5)
POTASSIUM SERPL-SCNC: 2.6 MMOL/L (ref 3.7–5.3)
POTASSIUM SERPL-SCNC: 2.9 MMOL/L (ref 3.7–5.3)
POTASSIUM SERPL-SCNC: 3 MMOL/L (ref 3.7–5.3)
RBC # BLD AUTO: 3.92 M/UL (ref 3.95–5.11)
SODIUM SERPL-SCNC: 140 MMOL/L (ref 136–145)
VIT B12 SERPL-MCNC: 493 PG/ML (ref 232–1245)
WBC OTHER # BLD: 5.1 K/UL (ref 3.5–11.3)

## 2025-05-17 PROCEDURE — 6370000000 HC RX 637 (ALT 250 FOR IP)

## 2025-05-17 PROCEDURE — 2060000000 HC ICU INTERMEDIATE R&B

## 2025-05-17 PROCEDURE — 95816 EEG AWAKE AND DROWSY: CPT

## 2025-05-17 PROCEDURE — 6360000002 HC RX W HCPCS

## 2025-05-17 PROCEDURE — 87205 SMEAR GRAM STAIN: CPT

## 2025-05-17 PROCEDURE — 85025 COMPLETE CBC W/AUTO DIFF WBC: CPT

## 2025-05-17 PROCEDURE — 36415 COLL VENOUS BLD VENIPUNCTURE: CPT

## 2025-05-17 PROCEDURE — 82607 VITAMIN B-12: CPT

## 2025-05-17 PROCEDURE — 2500000003 HC RX 250 WO HCPCS

## 2025-05-17 PROCEDURE — 84132 ASSAY OF SERUM POTASSIUM: CPT

## 2025-05-17 PROCEDURE — 82746 ASSAY OF FOLIC ACID SERUM: CPT

## 2025-05-17 PROCEDURE — 82947 ASSAY GLUCOSE BLOOD QUANT: CPT

## 2025-05-17 PROCEDURE — 2580000003 HC RX 258

## 2025-05-17 PROCEDURE — 99232 SBSQ HOSP IP/OBS MODERATE 35: CPT | Performed by: PSYCHIATRY & NEUROLOGY

## 2025-05-17 PROCEDURE — 95816 EEG AWAKE AND DROWSY: CPT | Performed by: PSYCHIATRY & NEUROLOGY

## 2025-05-17 PROCEDURE — 86140 C-REACTIVE PROTEIN: CPT

## 2025-05-17 PROCEDURE — 87070 CULTURE OTHR SPECIMN AEROBIC: CPT

## 2025-05-17 PROCEDURE — 85652 RBC SED RATE AUTOMATED: CPT

## 2025-05-17 PROCEDURE — 80048 BASIC METABOLIC PNL TOTAL CA: CPT

## 2025-05-17 PROCEDURE — 83735 ASSAY OF MAGNESIUM: CPT

## 2025-05-17 RX ORDER — LIDOCAINE HYDROCHLORIDE 10 MG/ML
5 INJECTION, SOLUTION EPIDURAL; INFILTRATION; INTRACAUDAL; PERINEURAL ONCE
Status: DISCONTINUED | OUTPATIENT
Start: 2025-05-17 | End: 2025-05-19

## 2025-05-17 RX ORDER — LORAZEPAM 2 MG/ML
2 INJECTION INTRAMUSCULAR PRN
Status: COMPLETED | OUTPATIENT
Start: 2025-05-17 | End: 2025-05-18

## 2025-05-17 RX ORDER — LORAZEPAM 2 MG/ML
0.5 INJECTION INTRAMUSCULAR ONCE
Status: COMPLETED | OUTPATIENT
Start: 2025-05-18 | End: 2025-05-18

## 2025-05-17 RX ORDER — POTASSIUM CHLORIDE 1500 MG/1
40 TABLET, EXTENDED RELEASE ORAL ONCE
Status: COMPLETED | OUTPATIENT
Start: 2025-05-17 | End: 2025-05-17

## 2025-05-17 RX ORDER — POTASSIUM CHLORIDE 1500 MG/1
80 TABLET, EXTENDED RELEASE ORAL ONCE
Status: DISCONTINUED | OUTPATIENT
Start: 2025-05-17 | End: 2025-05-17

## 2025-05-17 RX ORDER — THIAMINE HYDROCHLORIDE 100 MG/ML
200 INJECTION, SOLUTION INTRAMUSCULAR; INTRAVENOUS 3 TIMES DAILY
Status: DISCONTINUED | OUTPATIENT
Start: 2025-05-17 | End: 2025-05-20

## 2025-05-17 RX ORDER — POTASSIUM CHLORIDE 7.45 MG/ML
10 INJECTION INTRAVENOUS
Status: DISPENSED | OUTPATIENT
Start: 2025-05-17 | End: 2025-05-17

## 2025-05-17 RX ORDER — LORAZEPAM 2 MG/ML
0.5 INJECTION INTRAMUSCULAR PRN
Status: COMPLETED | OUTPATIENT
Start: 2025-05-17 | End: 2025-05-17

## 2025-05-17 RX ORDER — POTASSIUM CHLORIDE 1500 MG/1
80 TABLET, EXTENDED RELEASE ORAL ONCE
Status: COMPLETED | OUTPATIENT
Start: 2025-05-17 | End: 2025-05-17

## 2025-05-17 RX ADMIN — POTASSIUM CHLORIDE 80 MEQ: 1500 TABLET, EXTENDED RELEASE ORAL at 09:14

## 2025-05-17 RX ADMIN — THIAMINE HYDROCHLORIDE 200 MG: 100 INJECTION, SOLUTION INTRAMUSCULAR; INTRAVENOUS at 11:09

## 2025-05-17 RX ADMIN — CEFTRIAXONE SODIUM 2000 MG: 2 INJECTION, POWDER, FOR SOLUTION INTRAMUSCULAR; INTRAVENOUS at 21:54

## 2025-05-17 RX ADMIN — POTASSIUM CHLORIDE 10 MEQ: 7.46 INJECTION, SOLUTION INTRAVENOUS at 19:25

## 2025-05-17 RX ADMIN — HYDROMORPHONE HYDROCHLORIDE 0.5 MG: 1 INJECTION, SOLUTION INTRAMUSCULAR; INTRAVENOUS; SUBCUTANEOUS at 14:14

## 2025-05-17 RX ADMIN — CEFTRIAXONE SODIUM 2000 MG: 2 INJECTION, POWDER, FOR SOLUTION INTRAMUSCULAR; INTRAVENOUS at 11:05

## 2025-05-17 RX ADMIN — POTASSIUM CHLORIDE 10 MEQ: 7.46 INJECTION, SOLUTION INTRAVENOUS at 22:32

## 2025-05-17 RX ADMIN — POTASSIUM CHLORIDE 40 MEQ: 1500 TABLET, EXTENDED RELEASE ORAL at 13:34

## 2025-05-17 RX ADMIN — THIAMINE HYDROCHLORIDE 200 MG: 100 INJECTION, SOLUTION INTRAMUSCULAR; INTRAVENOUS at 21:33

## 2025-05-17 RX ADMIN — LORAZEPAM 0.5 MG: 2 INJECTION INTRAMUSCULAR; INTRAVENOUS at 14:14

## 2025-05-17 RX ADMIN — ASPIRIN 81 MG: 81 TABLET, CHEWABLE ORAL at 08:00

## 2025-05-17 RX ADMIN — ACYCLOVIR SODIUM 650 MG: 50 INJECTION, SOLUTION INTRAVENOUS at 11:08

## 2025-05-17 RX ADMIN — SODIUM CHLORIDE, PRESERVATIVE FREE 10 ML: 5 INJECTION INTRAVENOUS at 21:14

## 2025-05-17 RX ADMIN — THIAMINE HYDROCHLORIDE 200 MG: 100 INJECTION, SOLUTION INTRAMUSCULAR; INTRAVENOUS at 16:17

## 2025-05-17 RX ADMIN — LIDOCAINE HYDROCHLORIDE 20 ML: 10 INJECTION, SOLUTION EPIDURAL; INFILTRATION; INTRACAUDAL; PERINEURAL at 14:13

## 2025-05-17 RX ADMIN — ACYCLOVIR SODIUM 650 MG: 50 INJECTION, SOLUTION INTRAVENOUS at 16:17

## 2025-05-17 RX ADMIN — POTASSIUM CHLORIDE 40 MEQ: 1500 TABLET, EXTENDED RELEASE ORAL at 17:55

## 2025-05-17 RX ADMIN — ENOXAPARIN SODIUM 40 MG: 100 INJECTION SUBCUTANEOUS at 08:00

## 2025-05-17 RX ADMIN — POTASSIUM CHLORIDE 10 MEQ: 7.46 INJECTION, SOLUTION INTRAVENOUS at 21:11

## 2025-05-17 RX ADMIN — POTASSIUM CHLORIDE 10 MEQ: 7.46 INJECTION, SOLUTION INTRAVENOUS at 17:54

## 2025-05-17 ASSESSMENT — PAIN SCALES - GENERAL
PAINLEVEL_OUTOF10: 0

## 2025-05-17 ASSESSMENT — PAIN DESCRIPTION - LOCATION: LOCATION: BACK

## 2025-05-17 NOTE — PROGRESS NOTES
Pharmacy Note     Dose Adjustment    Purvi Ray is a 54 y.o. female. Pharmacist assessment of medication dosing.    Recent Labs     05/16/25  1038   BUN 5*       Recent Labs     05/16/25  1030 05/16/25  1038   CREATININE 0.6 0.7       Estimated Creatinine Clearance: 90 mL/min (based on SCr of 0.7 mg/dL).      Height:   Ht Readings from Last 1 Encounters:   05/16/25 1.575 m (5' 2\")     Weight:  Wt Readings from Last 1 Encounters:   05/16/25 83.8 kg (184 lb 11.9 oz)       The following medication dose has been adjusted based upon P&T Guidelines:             Acyclovir dose changed to utilize adjusted body weight. Acyclovir 650 mg IV q8h.    Bradley Matt, Pharm.D., KEITH, [facility]  5/17/2025 8:02 AM

## 2025-05-17 NOTE — ACP (ADVANCE CARE PLANNING)
Advance Care Planning     Advance Care Planning Inpatient Note  Spiritual Care Department    Today's Date: 5/16/2025  Unit: [facility] 1C STEPDOWN    Received request from Other: nurse .  Upon review of chart and communication with care team, Spiritual Care will defer advance care planning with patient at this time.. Child/Children was/were present in the room during visit.    Goals of ACP Conversation:  Discuss advance care planning documents    Health Care Decision Makers:     No healthcare decision makers have been documented.    Summary:  No Decision Maker named by patient at this time    Advance Care Planning Documents (Patient Wishes):  None     Assessment:  Patient not orient,  spoke with patient daughter - Candance about state of [place] hierarchy     Interventions:  Discussed and provided education on state decision maker hierarchy    Care Preferences Communicated:   No    Outcomes/Plan:  ACP Discussion: Postponed    Electronically signed by Chaplain ARNAV on 5/16/2025 at 9:19 PM
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
## 2025-05-17 NOTE — PROGRESS NOTES
Writer at bedside performing shift assessment. Patients daughter Naz Cory at bedside, with concerns regarding patients wellbeing at home and wanting to speak to a physician about a evaluation by psych/. Physician unable to come to bedside at this time. Charge nurse Aria called to bedside to talk to daughter about concerns and how to go about everything. Patient currently not oriented to time, place or situation so daughter Naz is decision maker for patient. [facility] guilherme was called to come discuss POA paper work and options. Daughter requesting patient be made confidential due to concerns for her wellbeing at home.    Naz can be reached at [phone], she will be here all weekend before going back to [place] on Sunday.

## 2025-05-17 NOTE — PROCEDURES
PROCEDURE NOTE  Date: 5/17/2025   Name: Purvi Ray  YOB: 1970    Procedures            Date: 5/17/2025  Referring physician: Dr Barbara Collins  Patient aged 54 y with possible seizure. EEG done to assess for epileptiform activity.    Introduction  This routine 21-minute EEG was recorded using the International 10-20 System on a [employer] workstation at 256 samples/s. Automated spike and seizure detection algorithms were applied.    Description  significantly limited due to EMG artifact but no clear electrographic seizure seen. No consistent focal slowing or interhemispheric asymmetry was noted. Stage I and stage II sleep were not observed. There were no clear interictal epileptiform discharges or electrographic seizures.    Activations  Hyperventilation was not performed. Intermittent photic stimulation was performed and demonstrated no posterior driving response.    Impression  unremarkable awake EEG. Significantly limited due to EMG artifact.     No epileptiform discharges were identified. Please note the absence of such activity on this record cannot conclusively rule out an epileptic disorder. If such is still clinically suspected, a repeat study with sleep deprivation and/or prolonged sampling may be helpful.    Ivonne Solomon MD  Epilepsy Board Certified.  Neurology Board Certified.    Electronically Signed
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
## 2025-05-17 NOTE — PLAN OF CARE
Problem: Chronic Conditions and Co-morbidities  Goal: Patient's chronic conditions and co-morbidity symptoms are monitored and maintained or improved  5/17/2025 0738 by Kennedi Sherman RN  Outcome: Progressing  5/17/2025 0407 by Sonia Villegas RN  Outcome: Progressing     Problem: Discharge Planning  Goal: Discharge to home or other facility with appropriate resources  5/17/2025 0738 by Kennedi Sherman RN  Outcome: Progressing  5/17/2025 0407 by Sonia Villegas RN  Outcome: Progressing     Problem: Pain  Goal: Verbalizes/displays adequate comfort level or baseline comfort level  5/17/2025 0738 by Kennedi Sherman RN  Outcome: Progressing  5/17/2025 0407 by Sonia Villegas RN  Outcome: Progressing

## 2025-05-18 ENCOUNTER — APPOINTMENT (OUTPATIENT)
Dept: CT IMAGING | Age: 55
DRG: 897 | End: 2025-05-18
Payer: MEDICARE

## 2025-05-18 ENCOUNTER — APPOINTMENT (OUTPATIENT)
Dept: MRI IMAGING | Age: 55
DRG: 897 | End: 2025-05-18
Payer: MEDICARE

## 2025-05-18 LAB
ANION GAP SERPL CALCULATED.3IONS-SCNC: 12 MMOL/L (ref 9–16)
BASOPHILS # BLD: <0.03 K/UL (ref 0–0.2)
BASOPHILS NFR BLD: 0 % (ref 0–2)
BUN SERPL-MCNC: 2 MG/DL (ref 6–20)
CALCIUM SERPL-MCNC: 8.1 MG/DL (ref 8.6–10.4)
CHLORIDE SERPL-SCNC: 109 MMOL/L (ref 98–107)
CO2 SERPL-SCNC: 19 MMOL/L (ref 20–31)
CREAT SERPL-MCNC: 0.5 MG/DL (ref 0.6–0.9)
EOSINOPHIL # BLD: 0.12 K/UL (ref 0–0.44)
EOSINOPHILS RELATIVE PERCENT: 2 % (ref 1–4)
ERYTHROCYTE [DISTWIDTH] IN BLOOD BY AUTOMATED COUNT: 12.8 % (ref 11.8–14.4)
GFR, ESTIMATED: >90 ML/MIN/1.73M2
GLUCOSE SERPL-MCNC: 100 MG/DL (ref 74–99)
HCT VFR BLD AUTO: 35 % (ref 36.3–47.1)
HGB BLD-MCNC: 12.5 G/DL (ref 11.9–15.1)
IMM GRANULOCYTES # BLD AUTO: 0.03 K/UL (ref 0–0.3)
IMM GRANULOCYTES NFR BLD: 1 %
LYMPHOCYTES NFR BLD: 1.07 K/UL (ref 1.1–3.7)
LYMPHOCYTES RELATIVE PERCENT: 20 % (ref 24–43)
MCH RBC QN AUTO: 34.8 PG (ref 25.2–33.5)
MCHC RBC AUTO-ENTMCNC: 35.7 G/DL (ref 28.4–34.8)
MCV RBC AUTO: 97.5 FL (ref 82.6–102.9)
MONOCYTES NFR BLD: 0.33 K/UL (ref 0.1–1.2)
MONOCYTES NFR BLD: 6 % (ref 3–12)
NEUTROPHILS NFR BLD: 70 % (ref 36–65)
NEUTS SEG NFR BLD: 3.72 K/UL (ref 1.5–8.1)
NRBC BLD-RTO: 0 PER 100 WBC
PLATELET # BLD AUTO: ABNORMAL K/UL (ref 138–453)
PLATELET, FLUORESCENCE: ABNORMAL K/UL (ref 138–453)
POTASSIUM SERPL-SCNC: 3.4 MMOL/L (ref 3.7–5.3)
POTASSIUM SERPL-SCNC: 3.6 MMOL/L (ref 3.7–5.3)
RBC # BLD AUTO: 3.59 M/UL (ref 3.95–5.11)
SODIUM SERPL-SCNC: 140 MMOL/L (ref 136–145)
WBC OTHER # BLD: 5.3 K/UL (ref 3.5–11.3)

## 2025-05-18 PROCEDURE — 6370000000 HC RX 637 (ALT 250 FOR IP)

## 2025-05-18 PROCEDURE — 85055 RETICULATED PLATELET ASSAY: CPT

## 2025-05-18 PROCEDURE — 6360000002 HC RX W HCPCS

## 2025-05-18 PROCEDURE — A9576 INJ PROHANCE MULTIPACK: HCPCS | Performed by: PSYCHIATRY & NEUROLOGY

## 2025-05-18 PROCEDURE — 95700 EEG CONT REC W/VID EEG TECH: CPT

## 2025-05-18 PROCEDURE — 2500000003 HC RX 250 WO HCPCS

## 2025-05-18 PROCEDURE — 36415 COLL VENOUS BLD VENIPUNCTURE: CPT

## 2025-05-18 PROCEDURE — 80048 BASIC METABOLIC PNL TOTAL CA: CPT

## 2025-05-18 PROCEDURE — 6360000004 HC RX CONTRAST MEDICATION

## 2025-05-18 PROCEDURE — 70553 MRI BRAIN STEM W/O & W/DYE: CPT

## 2025-05-18 PROCEDURE — 84132 ASSAY OF SERUM POTASSIUM: CPT

## 2025-05-18 PROCEDURE — 6360000004 HC RX CONTRAST MEDICATION: Performed by: PSYCHIATRY & NEUROLOGY

## 2025-05-18 PROCEDURE — 71260 CT THORAX DX C+: CPT

## 2025-05-18 PROCEDURE — 74177 CT ABD & PELVIS W/CONTRAST: CPT

## 2025-05-18 PROCEDURE — 95711 VEEG 2-12 HR UNMONITORED: CPT

## 2025-05-18 PROCEDURE — 99232 SBSQ HOSP IP/OBS MODERATE 35: CPT | Performed by: PSYCHIATRY & NEUROLOGY

## 2025-05-18 PROCEDURE — 85025 COMPLETE CBC W/AUTO DIFF WBC: CPT

## 2025-05-18 PROCEDURE — 2580000003 HC RX 258

## 2025-05-18 PROCEDURE — 2060000000 HC ICU INTERMEDIATE R&B

## 2025-05-18 PROCEDURE — 6360000002 HC RX W HCPCS: Performed by: NURSE PRACTITIONER

## 2025-05-18 RX ORDER — IOPAMIDOL 755 MG/ML
100 INJECTION, SOLUTION INTRAVASCULAR
Status: COMPLETED | OUTPATIENT
Start: 2025-05-18 | End: 2025-05-18

## 2025-05-18 RX ORDER — LIDOCAINE HYDROCHLORIDE 10 MG/ML
5 INJECTION, SOLUTION EPIDURAL; INFILTRATION; INTRACAUDAL; PERINEURAL ONCE
Status: COMPLETED | OUTPATIENT
Start: 2025-05-18 | End: 2025-05-18

## 2025-05-18 RX ORDER — FOLIC ACID 1 MG/1
1 TABLET ORAL DAILY
Status: DISCONTINUED | OUTPATIENT
Start: 2025-05-18 | End: 2025-05-30 | Stop reason: HOSPADM

## 2025-05-18 RX ORDER — MULTIVITAMIN WITH IRON
1 TABLET ORAL DAILY
Status: DISCONTINUED | OUTPATIENT
Start: 2025-05-18 | End: 2025-05-30 | Stop reason: HOSPADM

## 2025-05-18 RX ORDER — MIDAZOLAM HYDROCHLORIDE 2 MG/2ML
3 INJECTION, SOLUTION INTRAMUSCULAR; INTRAVENOUS ONCE
Status: COMPLETED | OUTPATIENT
Start: 2025-05-18 | End: 2025-05-18

## 2025-05-18 RX ORDER — POTASSIUM CHLORIDE 1500 MG/1
40 TABLET, EXTENDED RELEASE ORAL 2 TIMES DAILY
Status: COMPLETED | OUTPATIENT
Start: 2025-05-18 | End: 2025-05-18

## 2025-05-18 RX ADMIN — MIDAZOLAM HYDROCHLORIDE 3 MG: 1 INJECTION, SOLUTION INTRAMUSCULAR; INTRAVENOUS at 14:55

## 2025-05-18 RX ADMIN — MULTIVITAMIN TABLET 1 TABLET: TABLET at 09:42

## 2025-05-18 RX ADMIN — LIDOCAINE HYDROCHLORIDE 5 ML: 10 INJECTION, SOLUTION EPIDURAL; INFILTRATION; INTRACAUDAL; PERINEURAL at 15:51

## 2025-05-18 RX ADMIN — CEFTRIAXONE SODIUM 2000 MG: 2 INJECTION, POWDER, FOR SOLUTION INTRAMUSCULAR; INTRAVENOUS at 08:05

## 2025-05-18 RX ADMIN — ACYCLOVIR SODIUM 650 MG: 50 INJECTION, SOLUTION INTRAVENOUS at 00:01

## 2025-05-18 RX ADMIN — THIAMINE HYDROCHLORIDE 200 MG: 100 INJECTION, SOLUTION INTRAMUSCULAR; INTRAVENOUS at 09:45

## 2025-05-18 RX ADMIN — IOHEXOL 50 ML: 240 INJECTION, SOLUTION INTRATHECAL; INTRAVASCULAR; INTRAVENOUS; ORAL at 14:05

## 2025-05-18 RX ADMIN — SODIUM CHLORIDE: 0.9 INJECTION, SOLUTION INTRAVENOUS at 09:38

## 2025-05-18 RX ADMIN — ACYCLOVIR SODIUM 650 MG: 50 INJECTION, SOLUTION INTRAVENOUS at 17:26

## 2025-05-18 RX ADMIN — IOPAMIDOL 100 ML: 755 INJECTION, SOLUTION INTRAVENOUS at 14:05

## 2025-05-18 RX ADMIN — POTASSIUM BICARBONATE 40 MEQ: 782 TABLET, EFFERVESCENT ORAL at 20:12

## 2025-05-18 RX ADMIN — POTASSIUM CHLORIDE 40 MEQ: 1500 TABLET, EXTENDED RELEASE ORAL at 09:42

## 2025-05-18 RX ADMIN — THIAMINE HYDROCHLORIDE 200 MG: 100 INJECTION, SOLUTION INTRAMUSCULAR; INTRAVENOUS at 17:27

## 2025-05-18 RX ADMIN — SODIUM CHLORIDE, PRESERVATIVE FREE 10 ML: 5 INJECTION INTRAVENOUS at 20:17

## 2025-05-18 RX ADMIN — ENOXAPARIN SODIUM 40 MG: 100 INJECTION SUBCUTANEOUS at 09:43

## 2025-05-18 RX ADMIN — Medication 2 MG: at 15:52

## 2025-05-18 RX ADMIN — THIAMINE HYDROCHLORIDE 200 MG: 100 INJECTION, SOLUTION INTRAMUSCULAR; INTRAVENOUS at 20:16

## 2025-05-18 RX ADMIN — FOLIC ACID 1 MG: 1 TABLET ORAL at 09:42

## 2025-05-18 RX ADMIN — Medication 2 MG: at 11:08

## 2025-05-18 RX ADMIN — ACYCLOVIR SODIUM 650 MG: 50 INJECTION, SOLUTION INTRAVENOUS at 08:10

## 2025-05-18 RX ADMIN — LORAZEPAM 0.5 MG: 2 INJECTION INTRAMUSCULAR; INTRAVENOUS at 01:15

## 2025-05-18 RX ADMIN — GADOTERIDOL 16 ML: 279.3 INJECTION, SOLUTION INTRAVENOUS at 11:43

## 2025-05-18 RX ADMIN — CEFTRIAXONE SODIUM 2000 MG: 2 INJECTION, POWDER, FOR SOLUTION INTRAMUSCULAR; INTRAVENOUS at 20:12

## 2025-05-18 ASSESSMENT — PAIN SCALES - GENERAL: PAINLEVEL_OUTOF10: 0
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
## 2025-05-18 NOTE — PLAN OF CARE
Problem: Chronic Conditions and Co-morbidities  Goal: Patient's chronic conditions and co-morbidity symptoms are monitored and maintained or improved  Recent Flowsheet Documentation  Taken 5/17/2025 2000 by Prerna Kenney RN  Care Plan - Patient's Chronic Conditions and Co-Morbidity Symptoms are Monitored and Maintained or Improved: Monitor and assess patient's chronic conditions and comorbid symptoms for stability, deterioration, or improvement     Problem: Discharge Planning  Goal: Discharge to home or other facility with appropriate resources  Recent Flowsheet Documentation  Taken 5/17/2025 2000 by Prerna Kenney RN  Discharge to home or other facility with appropriate resources: Identify barriers to discharge with patient and caregiver     Problem: Safety - Adult  Goal: Free from fall injury  Recent Flowsheet Documentation  Taken 5/17/2025 2000 by Prerna Kenney RN  Free From Fall Injury: Instruct family/caregiver on patient safety

## 2025-05-19 ENCOUNTER — APPOINTMENT (OUTPATIENT)
Dept: INTERVENTIONAL RADIOLOGY/VASCULAR | Age: 55
DRG: 897 | End: 2025-05-19
Payer: MEDICARE

## 2025-05-19 LAB
APPEARANCE CSF: ABNORMAL
BASOPHILS # BLD: 0.04 K/UL (ref 0–0.2)
BASOPHILS NFR BLD: 1 % (ref 0–2)
C GATTII+NEOFOR DNA CSF QL NAA+NON-PROBE: NOT DETECTED
CLOT CHECK: ABNORMAL
CMV DNA CSF QL NAA+NON-PROBE: NOT DETECTED
COLOR CSF: ABNORMAL
E COLI K1 DNA CSF QL NAA+NON-PROBE: NOT DETECTED
EOSINOPHIL # BLD: 0.12 K/UL (ref 0–0.44)
EOSINOPHIL NFR CSF: 2 %
EOSINOPHILS RELATIVE PERCENT: 3 % (ref 1–4)
ERYTHROCYTE [DISTWIDTH] IN BLOOD BY AUTOMATED COUNT: 13.1 % (ref 11.8–14.4)
EV RNA CSF QL NAA+NON-PROBE: NOT DETECTED
GLUCOSE CSF-MCNC: 66 MG/DL (ref 40–70)
GP B STREP DNA CSF QL NAA+NON-PROBE: NOT DETECTED
HAEM INFLU DNA CSF QL NAA+NON-PROBE: NOT DETECTED
HCT VFR BLD AUTO: 37.4 % (ref 36.3–47.1)
HGB BLD-MCNC: 11.9 G/DL (ref 11.9–15.1)
HHV6 DNA CSF QL NAA+NON-PROBE: NOT DETECTED
HSV1 DNA CSF QL NAA+NON-PROBE: NOT DETECTED
HSV2 DNA CSF QL NAA+NON-PROBE: NOT DETECTED
IMM GRANULOCYTES # BLD AUTO: 0 K/UL (ref 0–0.3)
IMM GRANULOCYTES NFR BLD: 0 %
L MONOCYTOG DNA CSF QL NAA+NON-PROBE: NOT DETECTED
LYMPHOCYTES NFR BLD: 1.04 K/UL (ref 1.1–3.7)
LYMPHOCYTES NFR CSF: 24 % (ref 40–80)
LYMPHOCYTES RELATIVE PERCENT: 26 % (ref 24–43)
MCH RBC QN AUTO: 35.2 PG (ref 25.2–33.5)
MCHC RBC AUTO-ENTMCNC: 31.8 G/DL (ref 28.4–34.8)
MCV RBC AUTO: 110.7 FL (ref 82.6–102.9)
MONOCYTES NFR BLD: 0.28 K/UL (ref 0.1–1.2)
MONOCYTES NFR BLD: 7 % (ref 3–12)
MONOCYTES, CSF: 12 % (ref 15–45)
MORPHOLOGY: ABNORMAL
N MEN DNA CSF QL NAA+NON-PROBE: NOT DETECTED
NEUTROPHILS NFR BLD: 63 % (ref 36–65)
NEUTROPHILS NFR CSF: 62 % (ref 0–6)
NEUTS SEG NFR BLD: 2.52 K/UL (ref 1.5–8.1)
NRBC BLD-RTO: 0 PER 100 WBC
PARECHOVIRUS A RNA CSF QL NAA+NON-PROBE: NOT DETECTED
PLATELET # BLD AUTO: 186 K/UL (ref 138–453)
PMV BLD AUTO: 9.5 FL (ref 8.1–13.5)
PROT CSF-MCNC: 182 MG/DL (ref 15–45)
RBC # BLD AUTO: 3.38 M/UL (ref 3.95–5.11)
RBC # FLD MANUAL: 5000 CELLS/UL
S PNEUM DNA CSF QL NAA+NON-PROBE: NOT DETECTED
SPECIMEN DESCRIPTION: NORMAL
SPECIMEN VOL CSF: 2 ML
TOTAL CELLS COUNTED BRONCH: 5 CELLS/UL (ref 0–5)
TUBE # CSF: 1
VZV DNA CSF QL NAA+NON-PROBE: NOT DETECTED
WBC OTHER # BLD: 4 K/UL (ref 3.5–11.3)
XANTHOCHROMIA CSF QL: PRESENT

## 2025-05-19 PROCEDURE — 82945 GLUCOSE OTHER FLUID: CPT

## 2025-05-19 PROCEDURE — 83916 OLIGOCLONAL BANDS: CPT

## 2025-05-19 PROCEDURE — 2580000003 HC RX 258

## 2025-05-19 PROCEDURE — 97530 THERAPEUTIC ACTIVITIES: CPT

## 2025-05-19 PROCEDURE — 36415 COLL VENOUS BLD VENIPUNCTURE: CPT

## 2025-05-19 PROCEDURE — 2500000003 HC RX 250 WO HCPCS

## 2025-05-19 PROCEDURE — 62328 DX LMBR SPI PNXR W/FLUOR/CT: CPT

## 2025-05-19 PROCEDURE — 97535 SELF CARE MNGMENT TRAINING: CPT

## 2025-05-19 PROCEDURE — 95720 EEG PHY/QHP EA INCR W/VEEG: CPT | Performed by: PSYCHIATRY & NEUROLOGY

## 2025-05-19 PROCEDURE — 84157 ASSAY OF PROTEIN OTHER: CPT

## 2025-05-19 PROCEDURE — 6370000000 HC RX 637 (ALT 250 FOR IP)

## 2025-05-19 PROCEDURE — 89051 BODY FLUID CELL COUNT: CPT

## 2025-05-19 PROCEDURE — 82784 ASSAY IGA/IGD/IGG/IGM EACH: CPT

## 2025-05-19 PROCEDURE — 85025 COMPLETE CBC W/AUTO DIFF WBC: CPT

## 2025-05-19 PROCEDURE — 82042 OTHER SOURCE ALBUMIN QUAN EA: CPT

## 2025-05-19 PROCEDURE — 2060000000 HC ICU INTERMEDIATE R&B

## 2025-05-19 PROCEDURE — 97166 OT EVAL MOD COMPLEX 45 MIN: CPT

## 2025-05-19 PROCEDURE — 82040 ASSAY OF SERUM ALBUMIN: CPT

## 2025-05-19 PROCEDURE — 87483 CNS DNA AMP PROBE TYPE 12-25: CPT

## 2025-05-19 PROCEDURE — 95714 VEEG EA 12-26 HR UNMNTR: CPT

## 2025-05-19 PROCEDURE — 97162 PT EVAL MOD COMPLEX 30 MIN: CPT

## 2025-05-19 PROCEDURE — 99233 SBSQ HOSP IP/OBS HIGH 50: CPT | Performed by: PSYCHIATRY & NEUROLOGY

## 2025-05-19 PROCEDURE — 6360000002 HC RX W HCPCS

## 2025-05-19 RX ADMIN — ACYCLOVIR SODIUM 650 MG: 50 INJECTION, SOLUTION INTRAVENOUS at 00:28

## 2025-05-19 RX ADMIN — CEFTRIAXONE SODIUM 2000 MG: 2 INJECTION, POWDER, FOR SOLUTION INTRAMUSCULAR; INTRAVENOUS at 20:22

## 2025-05-19 RX ADMIN — ACYCLOVIR SODIUM 650 MG: 50 INJECTION, SOLUTION INTRAVENOUS at 08:55

## 2025-05-19 RX ADMIN — ACYCLOVIR SODIUM 650 MG: 50 INJECTION, SOLUTION INTRAVENOUS at 23:53

## 2025-05-19 RX ADMIN — MULTIVITAMIN TABLET 1 TABLET: TABLET at 08:41

## 2025-05-19 RX ADMIN — CEFTRIAXONE SODIUM 2000 MG: 2 INJECTION, POWDER, FOR SOLUTION INTRAMUSCULAR; INTRAVENOUS at 08:48

## 2025-05-19 RX ADMIN — FOLIC ACID 1 MG: 1 TABLET ORAL at 08:41

## 2025-05-19 RX ADMIN — SODIUM CHLORIDE: 0.9 INJECTION, SOLUTION INTRAVENOUS at 02:30

## 2025-05-19 RX ADMIN — THIAMINE HYDROCHLORIDE 200 MG: 100 INJECTION, SOLUTION INTRAMUSCULAR; INTRAVENOUS at 20:22

## 2025-05-19 RX ADMIN — ACYCLOVIR SODIUM 650 MG: 50 INJECTION, SOLUTION INTRAVENOUS at 15:54

## 2025-05-19 RX ADMIN — THIAMINE HYDROCHLORIDE 200 MG: 100 INJECTION, SOLUTION INTRAMUSCULAR; INTRAVENOUS at 15:45

## 2025-05-19 RX ADMIN — THIAMINE HYDROCHLORIDE 200 MG: 100 INJECTION, SOLUTION INTRAMUSCULAR; INTRAVENOUS at 08:44

## 2025-05-19 RX ADMIN — SODIUM CHLORIDE: 0.9 INJECTION, SOLUTION INTRAVENOUS at 22:25

## 2025-05-19 ASSESSMENT — PAIN SCALES - GENERAL: PAINLEVEL_OUTOF10: 0

## 2025-05-19 NOTE — BRIEF OP NOTE
Brief Postoperative Note Lumbar Puncture    Purvi Ray  YOB: 1970  [id number]    Pre-operative Diagnosis: AMS, acute encephalopathy    Post-operative Diagnosis: Same    Procedure: Fluoro guided Lumbar Puncture    Anesthesia: 1% Lidocaine    Surgeons/Assistants: Daisha Adhikari PA-C and Dr. Shankar    Complications: None    EBL: Minimal    Specimens: Obtained and sent to lab    Fluoroscopy guided lumbar puncture. Variant vertebral anatomy on  fluoroscopic image. 20 gauge spinal needle used to access CSF at level L5/6.  A total of 2 ml pink tinted CSF obtained.  Due to patient motion, there was loss of free flowing CSF, attempted to adjust needle without success. Dressing applied. Instructions given.     Electronically signed by Daisha Adhikari PA-C on 5/19/2025 at 1:10 PM

## 2025-05-19 NOTE — PROCEDURES
PROCEDURE NOTE  Date: 5/19/2025   Name: Purvi Ray  YOB: 1970    Lumbar Puncture    Date/Time: 5/19/2025 7:44 AM    Performed by: Tamara Bhagat MD  Authorized by: Velia Reyes MD    Consent:     Consent obtained:  Verbal    Consent given by:  Patient (and patient's daughter (Candance) over the phone)    Risks, benefits, and alternatives were discussed: yes      Risks discussed:  Bleeding, infection, pain, headache, nerve damage and repeat procedure  Universal protocol:     Patient identity confirmed:  Verbally with patient, hospital-assigned identification number and arm band  Pre-procedure details:     Procedure purpose:  Diagnostic    Preparation: Patient was prepped and draped in usual sterile fashion    Anesthesia:     Anesthesia method:  Local infiltration    Local anesthetic:  Lidocaine 1% w/o epi  Procedure details:     Lumbar space:  L3-L4 interspace    Patient position:  L lateral decubitus    Needle gauge:  22  Post-procedure details:     Puncture site:  Adhesive bandage applied    Patient tolerance of procedure: Procedure terminated as patient could not tolerate procedure.
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
## 2025-05-19 NOTE — PLAN OF CARE
Problem: Chronic Conditions and Co-morbidities  Goal: Patient's chronic conditions and co-morbidity symptoms are monitored and maintained or improved  5/18/2025 2110 by Smith Obrien RN  Outcome: Progressing  5/18/2025 1057 by Eun Ferreira RN  Outcome: Progressing     Problem: Discharge Planning  Goal: Discharge to home or other facility with appropriate resources  5/18/2025 2110 by Smith Obrien RN  Outcome: Progressing  5/18/2025 1057 by Eun Ferreira RN  Outcome: Progressing     Problem: Pain  Goal: Verbalizes/displays adequate comfort level or baseline comfort level  5/18/2025 2110 by Smith Obrien RN  Outcome: Progressing  5/18/2025 1057 by Eun Ferreira RN  Outcome: Progressing     Problem: Safety - Adult  Goal: Free from fall injury  5/18/2025 2110 by Smith Obrien RN  Outcome: Progressing  5/18/2025 1057 by Eun Ferreira RN  Outcome: Progressing
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
## 2025-05-19 NOTE — PROGRESS NOTES
Microbiology calls writer re: questions about the CSF tubes that were sent to them from lumbar puncture, asking about tubes 1 & 2 , & what tests to run with what. Writer informs lab that the lumbar puncture was done in IR, & that writer was not present in that department when test was done & specimens were drawn. Writer informs micro that they need to contact the MD with any questions. Again they start to ask writer. Writer again informs them that they will have to contact the MD that placed their order.  then asked how are they supposed to contact them. Writer informs them thru Perfect serve. Writer heard the tech on phone with writer ask another tech if they have perfect serve. Writer heard the tech in the background say \"yes\". Tech then hangs up phone.

## 2025-05-19 NOTE — PROGRESS NOTES
Facility/Department: [address] STEPDOWN   Physical Therapy Initial Evaluation    Patient Name: Purvi Ray        MRN: [id number]    : 1970    Date of Service: 2025    Chief Complaint   Patient presents with    Altered Mental Status     Past Medical History:  has a past medical history of Asthma, Diabetes mellitus (HCC), HSV (herpes simplex virus) infection, HTN (hypertension), and Seasonal allergies.  Past Surgical History:  has a past surgical history that includes  section (); Dilation and curettage of uterus; and sterilization (2007).    Discharge Recommendations   Further therapy recommended at discharge.  PT Equipment Recommendations  Equipment Needed: No  Other: CTA. PT is unsafe to walk without skilled assistance.    Assessment  Body Structures, Functions, Activity Limitations Requiring Skilled Therapeutic Intervention: Decreased functional mobility , Decreased ADL status, Decreased endurance, Decreased cognition, Decreased safe awareness, Decreased strength  Assessment: Pt completed bed mobility w/ SBA and STS. Pt ambulated with CGA no device 5' to restroom then 20' around room. Cognition limiting safety to return home safely. Pt would benefit from continued acute care PT to address further deficits.  Therapy Prognosis: Good  Decision Making: Medium Complexity  Requires PT Follow-Up: Yes  Activity Tolerance  Activity Tolerance: Patient tolerated treatment well, Treatment limited secondary to medical complications  Activity Tolerance Comments: Ambulation distance limited by LTME  Safety Devices  Type of Devices: Left in chair, Chair alarm in place, Gait belt, Nurse notified, Sitter present, Telesitter in use, Patient at risk for falls, Call light within reach  Restraints  Restraints Initially in Place: No    AM-PAC  AM-PAC Basic Mobility - Inpatient   How much help is needed turning from your back to your side while in a flat bed without using bedrails?: None  How much help is

## 2025-05-19 NOTE — PROGRESS NOTES
Occupational Therapy  Occupational Therapy Initial Evaluation  Facility/Department: [facility] 1C STEPDOWN   Patient Name: Purvi Ray        MRN: [id number]    : 1970    Date of Service: 2025    Chief Complaint   Patient presents with    Altered Mental Status     Past Medical History:  has a past medical history of Asthma, Diabetes mellitus (HCC), HSV (herpes simplex virus) infection, HTN (hypertension), and Seasonal allergies.  Past Surgical History:  has a past surgical history that includes  section (); Dilation and curettage of uterus; and sterilization (2007).    Discharge Recommendations  Discharge Recommendations: Patient would benefit from continued therapy after discharge  OT Equipment Recommendations  Other: CTA    Assessment  Performance deficits / Impairments: Decreased functional mobility ;Decreased cognition;Decreased high-level IADLs;Decreased ADL status;Decreased balance;Decreased safe awareness  Assessment: Pt presents to OT this date with above noted deficits. Pt is not currently functioning at [facility]. Pt requires SBA bed mobility, CGA transfers/func mob, CGA toileting/sinkside ADL. D/t this Pt does not demonstrate the functional ability to safely return to prior living arrangements. It is recommended that Pt recieve OT services during hospitalization and after discharge to increase safety/ADLs for safe return to previous environment.  Prognosis: Good  Decision Making: Medium Complexity  REQUIRES OT FOLLOW-UP: Yes  Activity Tolerance  Activity Tolerance: Patient Tolerated treatment well  Safety Devices  Type of Devices: Call light within reach;Chair alarm in place;Gait belt;Patient at risk for falls;Left in chair;Nurse notified;Telesitter in use;Sitter present  Restraints  Restraints Initially in Place: No    AM-PAC  AM-PAC Daily Activity - Inpatient   How much help is needed for putting on and taking off regular lower body clothing?: A Little  How much help is needed for  reading  Hearing  Hearing: Within functional limits    BUE Assessment  Gross Assessment  AROM: Within functional limits  Strength: Within functional limits (BUE grossly 5/5)  Coordination: Within functional limits  Tone: Normal  Sensation: Intact  Hand Dominance: Right     Objective  Orientation  Overall Orientation Status: Impaired  Orientation Level: Oriented to place;Oriented to situation;Oriented to person;Disoriented to time (year 1985)  Cognition  Overall Cognitive Status: Exceptions  Arousal/Alertness: Appears intact  Following Commands: Follows one step commands with increased time;Follows multistep commands with increased time;Follows one step commands with repetition;Follows multistep commands with repetition  Attention Span: Attends with cues to redirect;Difficulty attending to directions;Difficulty dividing attention  Memory: Decreased recall of biographical Information  Safety Judgement: Decreased awareness of need for assistance;Decreased awareness of need for safety  Problem Solving: Assistance required to generate solutions;Assistance required to identify errors made;Assistance required to correct errors made;Assistance required to implement solutions;Decreased awareness of errors  Insights: Decreased awareness of deficits  Initiation: Requires cues for some  Sequencing: Requires cues for some    Activities of Daily Living  Feeding: Independent;Based on clinical judgement  Grooming: Contact guard assistance;Verbal cueing  Grooming Skilled Clinical Factors: standing sinkside oral care/hand hygiene. Pt required VCs for use of hand soap and attempted to utilize body wash as toothpaste requiring VC/visual cues to correct.  UE Bathing: Contact guard assistance  LE Bathing: Contact guard assistance  UE Dressing: Contact guard assistance  UE Dressing Skilled Clinical Factors: don new gown like jacket  LE Dressing: Contact guard assistance  LE Dressing Skilled Clinical Factors: CGA standing clothing

## 2025-05-19 NOTE — PROCEDURES
PROCEDURE NOTE  Date: 5/19/2025   Name: Purvi Ray  YOB: 1970    Procedures          Referring physician: Dr. Jimenez  Date: 5/19/2025  Start Time: 5/18/2025 @1630  End Time: 5/19/2025 @ 0730    Indication  Patient with encephalopathy, EEG done to rule out subclinical seizures.       Introduction  This continuous video-EEG was acquired using a [employer] workstation at 256 samples/s. Electrodes were placed according to the International 10-20 system. Automated spike and seizure detection algorithms were applied. Video was recorded during this study.    Description  During the maximal alert state, a well-regulated 8-9 Hz posterior dominant rhythm was seen which was symmetrical and attenuated to eye opening. No consistent focal slowing or interhemispheric asymmetry was noted. Normal sleep structures were observed. There were no interictal epileptiform discharges or electrographic seizures.    Events  No events reported.       Impression  Very limited study due to lead artifact, mainly frontal/temporal leads but over all unremarkable continuous video-EEG.      No clear epileptiform discharges were identified.  Please note the absence of   such activity in this record cannot conclusively rule out an epileptic   disorder.  If such is still clinically suspected, a repeat study with   prolonged sampling may be helpful.    Ivonne Solomon MD  Epilepsy Board Certified.  Neurology Board Certified.    Electronically Signed

## 2025-05-19 NOTE — FLOWSHEET NOTE
LUMBAR PUNCTURE    DR. ANDRADE AND TIFFANI NORIEGA  MS  RN  KT  RT    RT    PATIENT TO IR, IDENTIFIED, ALLERGIES CONFIRMED. MONITORS ON. PRONE. STABLE.     TIME OUT COMPLETE. PREP TO LUMBAR REGION PER DR. ANDRADE    2MLS  OF CLEAR COLORED CSF OBTAINED-slightly pink fluid with increasing clarity-pt. Very restless and confused and continuously fidgeting and moving    BANDAGE TO LUMBAR REGION PUNCTURE SITE.   REPORT CALLED TO  RN. PATIENT STABLE, OFF MONITOR, BACK TO STRET[facility]ER AND READY FOR TRANSPORT. SAMPLES OBTAINED SENT TO LAB PER WRITER DANILO.

## 2025-05-20 PROBLEM — G03.9 MENINGITIS: Status: ACTIVE | Noted: 2025-05-20

## 2025-05-20 LAB
ANION GAP SERPL CALCULATED.3IONS-SCNC: 12 MMOL/L (ref 9–16)
BASOPHILS # BLD: <0.03 K/UL (ref 0–0.2)
BASOPHILS NFR BLD: 0 % (ref 0–2)
BUN SERPL-MCNC: 4 MG/DL (ref 6–20)
CALCIUM SERPL-MCNC: 9 MG/DL (ref 8.6–10.4)
CHLORIDE SERPL-SCNC: 107 MMOL/L (ref 98–107)
CO2 SERPL-SCNC: 21 MMOL/L (ref 20–31)
CREAT SERPL-MCNC: 0.5 MG/DL (ref 0.6–0.9)
EOSINOPHIL # BLD: 0.1 K/UL (ref 0–0.44)
EOSINOPHILS RELATIVE PERCENT: 1 % (ref 1–4)
ERYTHROCYTE [DISTWIDTH] IN BLOOD BY AUTOMATED COUNT: 13 % (ref 11.8–14.4)
GFR, ESTIMATED: >90 ML/MIN/1.73M2
GLUCOSE SERPL-MCNC: 96 MG/DL (ref 74–99)
HCT VFR BLD AUTO: 35.1 % (ref 36.3–47.1)
HGB BLD-MCNC: 12.5 G/DL (ref 11.9–15.1)
IMM GRANULOCYTES # BLD AUTO: 0.03 K/UL (ref 0–0.3)
IMM GRANULOCYTES NFR BLD: 0 %
LYMPHOCYTES NFR BLD: 0.77 K/UL (ref 1.1–3.7)
LYMPHOCYTES RELATIVE PERCENT: 11 % (ref 24–43)
MCH RBC QN AUTO: 35.5 PG (ref 25.2–33.5)
MCHC RBC AUTO-ENTMCNC: 35.6 G/DL (ref 28.4–34.8)
MCV RBC AUTO: 99.7 FL (ref 82.6–102.9)
MONOCYTES NFR BLD: 0.36 K/UL (ref 0.1–1.2)
MONOCYTES NFR BLD: 5 % (ref 3–12)
NEUTROPHILS NFR BLD: 82 % (ref 36–65)
NEUTS SEG NFR BLD: 5.7 K/UL (ref 1.5–8.1)
NRBC BLD-RTO: 0 PER 100 WBC
PLATELET # BLD AUTO: 185 K/UL (ref 138–453)
PMV BLD AUTO: 8.8 FL (ref 8.1–13.5)
POTASSIUM SERPL-SCNC: 3.6 MMOL/L (ref 3.7–5.3)
RBC # BLD AUTO: 3.52 M/UL (ref 3.95–5.11)
SODIUM SERPL-SCNC: 140 MMOL/L (ref 136–145)
VIT B1 PYROPHOSHATE BLD-SCNC: 111 NMOL/L (ref 70–180)
WBC OTHER # BLD: 7 K/UL (ref 3.5–11.3)

## 2025-05-20 PROCEDURE — 2060000000 HC ICU INTERMEDIATE R&B

## 2025-05-20 PROCEDURE — 2500000003 HC RX 250 WO HCPCS

## 2025-05-20 PROCEDURE — 80048 BASIC METABOLIC PNL TOTAL CA: CPT

## 2025-05-20 PROCEDURE — 99232 SBSQ HOSP IP/OBS MODERATE 35: CPT | Performed by: PSYCHIATRY & NEUROLOGY

## 2025-05-20 PROCEDURE — 99222 1ST HOSP IP/OBS MODERATE 55: CPT | Performed by: INTERNAL MEDICINE

## 2025-05-20 PROCEDURE — 95711 VEEG 2-12 HR UNMONITORED: CPT

## 2025-05-20 PROCEDURE — 95720 EEG PHY/QHP EA INCR W/VEEG: CPT | Performed by: PSYCHIATRY & NEUROLOGY

## 2025-05-20 PROCEDURE — 97110 THERAPEUTIC EXERCISES: CPT

## 2025-05-20 PROCEDURE — 6360000002 HC RX W HCPCS

## 2025-05-20 PROCEDURE — 6370000000 HC RX 637 (ALT 250 FOR IP)

## 2025-05-20 PROCEDURE — 95718 EEG PHYS/QHP 2-12 HR W/VEEG: CPT | Performed by: PSYCHIATRY & NEUROLOGY

## 2025-05-20 PROCEDURE — 97116 GAIT TRAINING THERAPY: CPT

## 2025-05-20 PROCEDURE — 2580000003 HC RX 258

## 2025-05-20 PROCEDURE — 36415 COLL VENOUS BLD VENIPUNCTURE: CPT

## 2025-05-20 PROCEDURE — 97535 SELF CARE MNGMENT TRAINING: CPT

## 2025-05-20 PROCEDURE — 85025 COMPLETE CBC W/AUTO DIFF WBC: CPT

## 2025-05-20 RX ORDER — LORAZEPAM 2 MG/ML
4 INJECTION INTRAMUSCULAR PRN
Status: COMPLETED | OUTPATIENT
Start: 2025-05-20 | End: 2025-05-22

## 2025-05-20 RX ORDER — QUETIAPINE FUMARATE 25 MG/1
25 TABLET, FILM COATED ORAL NIGHTLY
Status: DISCONTINUED | OUTPATIENT
Start: 2025-05-20 | End: 2025-05-30 | Stop reason: HOSPADM

## 2025-05-20 RX ORDER — LANOLIN ALCOHOL/MO/W.PET/CERES
100 CREAM (GRAM) TOPICAL DAILY
Status: DISCONTINUED | OUTPATIENT
Start: 2025-05-21 | End: 2025-05-30 | Stop reason: HOSPADM

## 2025-05-20 RX ADMIN — ENOXAPARIN SODIUM 40 MG: 100 INJECTION SUBCUTANEOUS at 08:52

## 2025-05-20 RX ADMIN — CEFTRIAXONE SODIUM 2000 MG: 2 INJECTION, POWDER, FOR SOLUTION INTRAMUSCULAR; INTRAVENOUS at 23:01

## 2025-05-20 RX ADMIN — SODIUM CHLORIDE: 0.9 INJECTION, SOLUTION INTRAVENOUS at 17:33

## 2025-05-20 RX ADMIN — MULTIVITAMIN TABLET 1 TABLET: TABLET at 08:52

## 2025-05-20 RX ADMIN — FOLIC ACID 1 MG: 1 TABLET ORAL at 08:52

## 2025-05-20 RX ADMIN — THIAMINE HYDROCHLORIDE 200 MG: 100 INJECTION, SOLUTION INTRAMUSCULAR; INTRAVENOUS at 08:59

## 2025-05-20 RX ADMIN — CEFTRIAXONE SODIUM 2000 MG: 2 INJECTION, POWDER, FOR SOLUTION INTRAMUSCULAR; INTRAVENOUS at 09:01

## 2025-05-20 RX ADMIN — QUETIAPINE FUMARATE 25 MG: 25 TABLET ORAL at 20:57

## 2025-05-20 RX ADMIN — ACYCLOVIR SODIUM 650 MG: 50 INJECTION, SOLUTION INTRAVENOUS at 09:14

## 2025-05-20 ASSESSMENT — PAIN SCALES - GENERAL
PAINLEVEL_OUTOF10: 0

## 2025-05-20 NOTE — PROGRESS NOTES
[facility] Neurology   IN-PATIENT SERVICE  General Neurology Progress Note   [facility]                Date:   5/20/2025  Patient name:  Purvi Ray  Date of admission:  5/16/2025 10:13 AM  MRN:   [id number]  Account:  [id number]  YOB: 1970  PCP:    Rebecca Leong MD  Room:   [address] [place] [address]  Code Status:    Full Code  Chief Complaint:   Chief Complaint   Patient presents with    Altered Mental Status       Interval history      The patient was seen and examined at bedside this morning.     Continues to be pleasantly confused, LP yesterday only yielded 2 cc of spinal fluid.  Meningitis/encephalitis panel negative.  ID consulted for antimicrobial recommendations-will DC acyclovir.    Plan to repeat LP with IR. Continue thiamine.    Brief History     54-year-old woman with history of hypertension, diabetes mellitus, HSV, and alcohol-related seizures was found down on the street next to a bottle of vodka. Details surrounding the event are unclear. EMS was called for confusion; last known well is unknown. On initial evaluation, patient was alert to name only, unable to state the date, month, or year. She was unable to perform simple calculations or repeat phrases. Cranial nerves were grossly intact, strength was symmetrical. Speech was fluent but comprehension was impaired. No clear dysarthria. She held upper extremities antigravity and withdrew to pain in the lower extremities. NIHSS was 8. BP in the 150s, glucose 128. CT head showed no acute abnormalities.    Differential at presentation included Wernicke’s encephalopathy, alcohol-related delirium, postictal confusion, HSV encephalitis, and toxic-metabolic encephalopathy. Initial labs notable for hypokalemia (2.4), hypomagnesemia (1.5), hypocalcemia (8.2), hyponatremia, metabolic acidosis. Ammonia was 45, TSH 1.53. Toxicology screen was negative.    Following CT, her mental status improved--she was able to state her age,    BMI 33.79 kg/m²   Temp (24hrs), Av.2 °F (36.8 °C), Min:98 °F (36.7 °C), Max:98.4 °F (36.9 °C)    No results for input(s): \"POCGLU\" in the last 72 hours.      Intake/Output Summary (Last 24 hours) at 2025 0918  Last data filed at 2025 1830  Gross per 24 hour   Intake 940 ml   Output --   Net 940 ml           Neurological Exam:         Mental status       GCS 15   Alert and oriented to self and place, not month or year  following all commands   speech is fluent, no dysarthria, aphasia.   Impaired concentration, fund of knowledge, acalculia, unable to name any other [place] president besides the current, unable to name all the months of the year            No nucheal rigidity, no Brudzinskin or Kernig's sign          Cranial nerves       II - visual fields intact to confrontation; pupils reactive     III, IV, VI - extraocular muscles intact; no JAYLENE; no nystagmus; no ptosis      V - normal facial sensation                                                                 VII - normal facial symmetry                                                               VIII - intact hearing                                                                               IX, X - symmetrical palate elevation                                                 XI - symmetrical shoulder shrug                                                         XII - midline tongue without atrophy or fasciculation         Motor function     Strength:   Extremities  Left  Right      Upper Ext     5/5        5/5       Lower Ext     3+/5        3+/5       Normal bulk and tone.          Sensory function       Intact to light touch  Intact to pin prick  Intact to vibration  Intact proprioception             Cerebellar    Intact finger-nose-finger testing.   Intact heel-to-shin testing.   No dysdiadochokinesia present.   No tremors                             Reflex function       2/4 symmetric throughout .   Downgoing plantar response

## 2025-05-20 NOTE — PROGRESS NOTES
Physical Therapy  Facility/Department: [address] STEPDOWN   Physical Therapy Daily Treatment Note    Patient Name: Purvi Ray        MRN: [id number]    : 1970    Date of Service: 2025    Chief Complaint   Patient presents with    Altered Mental Status     Past Medical History:  has a past medical history of Asthma, Diabetes mellitus (HCC), HSV (herpes simplex virus) infection, HTN (hypertension), and Seasonal allergies.  Past Surgical History:  has a past surgical history that includes  section (); Dilation and curettage of uterus; and sterilization (2007).    Discharge Recommendations  Discharge Recommendations: Patient would benefit from continued therapy after discharge  PT Equipment Recommendations  Equipment Needed: No  Other: .    Assessment  Body Structures, Functions, Activity Limitations Requiring Skilled Therapeutic Intervention: Decreased functional mobility ;Decreased ADL status;Decreased endurance;Decreased cognition;Decreased safe awareness;Decreased strength  Assessment: Pt completed bed mobility and transfers with SBA. Pt ambulated 250ft with no AD and CGA provided for safety. Recommending continued PT to address balance deficits and maximize safety and independence with mobility.  Therapy Prognosis: Good  Activity Tolerance  Activity Tolerance: Patient tolerated treatment well;Treatment limited secondary to decreased cognition  Safety Devices  Type of Devices: Call light within reach;Gait belt;Patient at risk for falls;Nurse notified;Telesitter in use;Sitter present;Left in bed;Bed alarm in place  Restraints  Restraints Initially in Place: No    AM-PAC  AM-PAC Basic Mobility - Inpatient   How much help is needed turning from your back to your side while in a flat bed without using bedrails?: None  How much help is needed moving from lying on your back to sitting on the side of a flat bed without using bedrails?: None  How much help is needed moving to and from a bed  solutions;Assistance required to identify errors made;Assistance required to correct errors made;Assistance required to implement solutions;Decreased awareness of errors  Insights: Decreased awareness of deficits  Initiation: Requires cues for some  Sequencing: Requires cues for some  Cognition Comment: Pt with overall decrease in problem solving, safety awareness, and insight into deficits.      Mobility   Bed mobility  Supine to Sit: Stand by assistance  Sit to Supine: Stand by assistance  Scooting: Stand by assistance  Bed Mobility Comments: HOB elevated, able to perform supine <-> sit with SBA, impulsive needing cueing to await therapists readiness.    Transfers  Sit to Stand: Stand by assistance  Stand to Sit: Stand by assistance  Comment: Transfers performed multiple times from EOB with no AD SBA provided for safety, impulsive at times needing cueing to await therapists readiness.    Ambulation  Surface: Level tile  Device: No Device  Assistance: Contact guard assistance  Quality of Gait: increase lateral sway, decrease step height, decrease step length, reciprocal gait pattern  Gait Deviations: Increased JIN;Slow Elena  Distance: 250ft  Comments: Pt overall fair stability, no true LOB.  More Ambulation?: No    Stairs/Curb  Stairs?: No      Balance   Balance  Posture: Good  Sitting - Static: Good  Sitting - Dynamic: Good  Standing - Static: Fair  Standing - Dynamic: Fair  Comments: seated balance assessed seated EOB, standing balance assessed with no AD.    Exercise  PT Exercises  Exercise Treatment:  Seated LE exercise program: Long Arc Quads, hip abduction/adduction, heel/toe raises, and marches. Reps: x10  Comments: Pt performed while seated EOB.     Patient Education  Patient Education  Education Given To: Patient  Education Provided: Role of Therapy  Education Method: Demonstration;Verbal  Barriers to Learning: None  Education Outcome: Verbalized understanding;Demonstrated understanding;Continued

## 2025-05-20 NOTE — PROCEDURES
PROCEDURE NOTE  Date: 5/19/2025   Name: Purvi Ray  YOB: 1970    Procedures      EEG was reviewed up to 10 PM and was normal    Electronically signed by Fabrizio Jimenez MD on 5/19/2025 at 10:22 PM

## 2025-05-20 NOTE — PROCEDURES
PROCEDURE NOTE  Date: 5/20/2025   Name: Purvi Ray  YOB: 1970    Procedures    LONG-TERM EEG-VIDEO MONITORING   CLINICAL NEUROPHYSIOLOGY LABORATORY  DEPARTMENT OF NEUROLOGY  [facility]     Patient: Purvi Ray  Age: 54 y.o.  MRN: [id number]    Referring Physician: No ref. provider found  History: The patient is a 54 y.o. female who presented breakthrough seizure/encephalopathy. This long-term video-EEG monitoring study was performed to determine the nature of the patient's clinical events. The patient is on neuroactive medications.   Purvi Ray   Current Facility-Administered Medications   Medication Dose Route Frequency Provider Last Rate Last Admin    folic acid (FOLVITE) tablet 1 mg  1 mg Oral Daily Daisha Faust MD   1 mg at 05/19/25 0841    multivitamin 1 tablet  1 tablet Oral Daily Daisha Faust MD   1 tablet at 05/19/25 0841    acyclovir (ZOVIRAX) 650 mg in sodium chloride 0.9 % 250 mL IVPB  10 mg/kg (Adjusted) IntraVENous q8h Onesimo Chinchilla MD   Stopped at 05/20/25 0053    cefTRIAXone (ROCEPHIN) 2,000 mg in sterile water 20 mL IV syringe  2,000 mg IntraVENous Q12H Onesimo Chinchilla MD   2,000 mg at 05/19/25 2022    thiamine (B-1) injection 200 mg  200 mg IntraVENous TID Onesimo Chinchilla MD   200 mg at 05/19/25 2022    albuterol sulfate HFA (PROVENTIL;VENTOLIN;PROAIR) 108 (90 Base) MCG/ACT inhaler 2 puff  2 puff Inhalation Q6H PRN Gaviota Naqvi MD        sodium chloride flush 0.9 % injection 5-40 mL  5-40 mL IntraVENous 2 times per day Gaviota Naqvi MD   10 mL at 05/18/25 2017    sodium chloride flush 0.9 % injection 5-40 mL  5-40 mL IntraVENous PRN Gaviota Naqvi MD        0.9 % sodium chloride infusion   IntraVENous PRN Gaviota Naqvi MD        potassium chloride (KLOR-CON M) extended release tablet 40 mEq  40 mEq Oral PRN Gaviota Naqvi MD        Or    potassium bicarb-citric acid (EFFER-K) effervescent tablet 40 mEq  40 mEq Oral PRN Gaviota Naqvi MD        Or     potassium chloride 10 mEq/100 mL IVPB (Peripheral Line)  10 mEq IntraVENous PRN Gaviota Naqvi MD   Stopped at 05/17/25 2338    magnesium sulfate 2000 mg in 50 mL IVPB premix  2,000 mg IntraVENous PRN Gaviota Naqvi MD        LORazepam (ATIVAN) injection 1 mg  1 mg IntraVENous Q5 Min PRN Gaviota Naqvi MD        enoxaparin (LOVENOX) injection 40 mg  40 mg SubCUTAneous Daily Gaviota Naqvi MD   40 mg at 05/18/25 0943    ondansetron (ZOFRAN-ODT) disintegrating tablet 4 mg  4 mg Oral Q8H PRN Gaviota Naqvi MD        Or    ondansetron (ZOFRAN) injection 4 mg  4 mg IntraVENous Q6H PRN Gaviota Naqvi MD        polyethylene glycol (GLYCOLAX) packet 17 g  17 g Oral Daily PRN Gaviota Naqvi MD        acetaminophen (TYLENOL) tablet 650 mg  650 mg Oral Q6H PRN Gaviota Naqvi MD        Or    acetaminophen (TYLENOL) suppository 650 mg  650 mg Rectal Q6H PRN Gaviota Naqvi MD        0.9 % sodium chloride infusion   IntraVENous Continuous Gaviota Naqvi MD 75 mL/hr at 05/19/25 2225 New Bag at 05/19/25 2225     Technical Description: This is a 21-channel digital EEG recording with time-locked video. Electrodes were placed in accordance with the 10-20 International System of Electrode Placement. Single lead EKG monitoring was included.    Baseline EEG Recording:  A formal baseline EEG recording was not obtained.     Day - 5/19/25, starting at 7:30 and reviewed upto 6:30 on 5/20/25    Interictal EEG Samples:  In the alert state, the posterior background rhythm was a symmetric, well-modulated, 9 Hz, 20-40 uV rhythm which reacted symmetrically to eye opening and had a normal frequency-amplitude gradient with an age-appropriate mixture of frequencies. During drowsiness, there were bursts of diffuse slowing and waxing and waning of the posterior dominant rhythm. During stage II sleep symmetric V waves, K complexes, and sleep spindles were seen.  The appearance of diffuse delta activity was observed in slow wave sleep. Muscle

## 2025-05-20 NOTE — CONSULTS
[facility] -   Infectious diseases evaluation  admission date 5/16/2025    reason for consultation:   Possible meningitis    Impression :   Current:  Found down - acute encephalopathy 5/16 possible meningitis  5/19 LP difficult twice - 2 cc bloody fluid removed - CSF PCR neg  Wakes up confused  MRI initially suspicious for bitemporal high signal - repeat MRI T2 flair less suspicious for infection -  Seizure - possibly post ictal  Etoh abuse concern for Wernicke      Other:  DM2 - asthma-  Discussion / summary of stay / plan of care/ Recommendations:     HENCE:   CSF PCR neg few days after admission - hence stop acyclovir since HSv is less likely  Keep ceftriaxone  5/17 - till 5/23 - 7 days    Infection Control Recommendations   [place] Precautions    Antimicrobial Stewardship Recommendations   Simplification of therapy  Targeted therapy  History of Present Illness:   Initial history:  Purvi Ray is a 54 y.o.-year-old female   Found down w a bottle on the street not responding - acute encephalopathy 5/16 - no fever - and WBc low normal- metabolic acidosis noticed and electrolyte abnormalities corrected -  CXR and CTAP chest are all negative  5/19 LP difficult twice - 2 cc bloody fluid removed - CSF PCR neg  Wakes up confused  MRI initially suspicious for bitemporal high signal - repeat MRI T2 flair less suspicious for infection -  Seizure - possibly post ictal  Etoh abuse concern for Wernicke  Metabolic acidosis as well and hypokalemia    Started on acyclovir and ceftriaxone CNS dosing since 5/17  Still confused -  ID called for AB management          Interval changes  5/20/2025   Patient Vitals for the past 8 hrs:   BP Temp Temp src Pulse Resp SpO2   05/20/25 0707 (!) 161/112 98.4 °F (36.9 °C) Oral 92 17 --   05/20/25 0335 (!) 140/92 -- -- 83 11 100 %       Summary of relevant labs:  Labs:    Micro:  CSF PCR neg  BC  5/16  results:        Thank you for allowing us to participate in the care of this patient.Please call with questions.    This note is created with the assistance of a speech recognition program.  While intending to generate adocument that actually reflects the content of the visit, the document can still have some errors including those of syntax and sound a like substitutions which may escape proof reading.  It such instances, actual meaningcan be extrapolated by contextual diversion.    Margaret Sher MD  Office: [phone]  Perfect serve / office [phone]           No

## 2025-05-20 NOTE — CARE COORDINATION
SW cont to follow as dtr concerned about pt's home situation and substance use.   Attempted to meet with pt, sitter present. Pt alert, talkative, affect bright but was not fully oriented. SW will cont to follow and discuss dtr's concerns with pt once she is able. Advised pt that SW met her dtr on Sat and she would like SW to keep her updated. Pt was agreeable for IRENE to call dtr, Su. Called Su and left message.

## 2025-05-20 NOTE — CARE COORDINATION
Case Management   Daily Progress Note       Patient Name: Purvi Ray                   YOB: 1970  Diagnosis: Hypokalemia [E87.6]  Hypomagnesemia [E83.42]  Altered mental status [R41.82]  Altered mental status, unspecified altered mental status type [R41.82]                       GMLOS: 3.3 days  Length of Stay: 4  days    Anticipated Discharge Date: Two or more days before discharge    Readmission Risk (Low < 19, Mod (19-27), High > 27): Readmission Risk Score: 8.5        Current Transitional Plan    [] Home Independently    [] Home with HC    [] Skilled Nursing Facility    [] Acute Rehabilitation    [] Long Term Acute Care (LTAC)    [] Other:     Plan for the Stay (Medical Management) :          Workflow Continuation (Additional Notes) : Spoke with daughter Candance and state she or other family will take her home with them. Demand that we place her in SNF d/t safety reasons and not being able to care for self.   Explained that if she were to get accepted it would not be a permanent situation d/t no secondary coverage. State send referrals anyway.   Referrals placed per family request to the following  [facility]  [facility]             AJITH LALA, DANILO  May 20, 2025

## 2025-05-20 NOTE — PROGRESS NOTES
Occupational Therapy Daily Treatment Note  Facility/Department: [facility] 1C STEPDOWN   Patient Name: Purvi Ray        MRN: [id number]    : 1970    Date of Service: 2025    Chief Complaint   Patient presents with    Altered Mental Status     Past Medical History:  has a past medical history of Asthma, Diabetes mellitus (HCC), HSV (herpes simplex virus) infection, HTN (hypertension), and Seasonal allergies.  Past Surgical History:  has a past surgical history that includes  section (); Dilation and curettage of uterus; and sterilization (2007).    Discharge Recommendations  Discharge Recommendations: Patient would benefit from continued therapy after discharge  OT Equipment Recommendations  Other: CTA    Assessment  Performance deficits / Impairments: Decreased functional mobility ;Decreased cognition;Decreased high-level IADLs;Decreased ADL status;Decreased balance;Decreased safe awareness  Assessment: Pt tolerated session well. Pt demonstrating increased ability to complete ADL and ADL mobility, limited by cognition with decreased problem solving, processing, insight into deficits and safety awareness. Pt requiring cues for task initiation and follow through, with decreased effectiveness of completion. Pt completed ADL mobility without use of device with grossly SBA, no overt LOB, decreased speed and cues for safety but overall good completion. Pt continues to benefit from OT to address impairments and to ensure optimal safety and independence.  Prognosis: Good  REQUIRES OT FOLLOW-UP: Yes  Activity Tolerance  Activity Tolerance: Patient Tolerated treatment well  Safety Devices  Type of Devices: Call light within reach;Gait belt;Patient at risk for falls;Left in chair;Nurse notified;Telesitter in use;Sitter present (Pt left up in recliner chair with legs elevated, all needs in reach, sitter present.)  Restraints  Restraints Initially in Place: No    AM-PAC  AM-PAC Daily Activity -  discharge  Short Term Goal 1: complete bed mobility IND  Short Term Goal 2: complete functional mobility SUP  Short Term Goal 3: complete transfers SUP  Short Term Goal 4: complete toileting/LB ADLs SUP  Short Term Goal 5: complete UB ADLs IND    Plan  Occupational Therapy Plan  Times Per Week: 4-5x/wk  Times Per Day: Once a day  Current Treatment Recommendations: Balance training, Functional mobility training, Gait training, Patient/Caregiver education & training, Safety education & training, Cognitive reorientation, Equipment evaluation, education, & procurement, Home management training, Self-Care / ADL    Minutes  OT Individual Minutes  Time In: 1012  Time Out: 1050  Minutes: 38  Time Code Minutes   Timed Code Treatment Minutes: 38 Minutes    Electronically signed by RAFAEL MISTRY OT on 5/20/25 at 11:53 AM EDT

## 2025-05-21 ENCOUNTER — APPOINTMENT (OUTPATIENT)
Dept: INTERVENTIONAL RADIOLOGY/VASCULAR | Age: 55
DRG: 897 | End: 2025-05-21
Payer: MEDICARE

## 2025-05-21 LAB
ANION GAP SERPL CALCULATED.3IONS-SCNC: 16 MMOL/L (ref 9–16)
BASOPHILS # BLD: <0.03 K/UL (ref 0–0.2)
BASOPHILS NFR BLD: 0 % (ref 0–2)
BUN SERPL-MCNC: 5 MG/DL (ref 6–20)
CALCIUM SERPL-MCNC: 9.1 MG/DL (ref 8.6–10.4)
CHLORIDE SERPL-SCNC: 105 MMOL/L (ref 98–107)
CO2 SERPL-SCNC: 19 MMOL/L (ref 20–31)
CREAT SERPL-MCNC: 0.5 MG/DL (ref 0.6–0.9)
EOSINOPHIL # BLD: 0.08 K/UL (ref 0–0.44)
EOSINOPHILS RELATIVE PERCENT: 1 % (ref 1–4)
ERYTHROCYTE [DISTWIDTH] IN BLOOD BY AUTOMATED COUNT: 13.6 % (ref 11.8–14.4)
GFR, ESTIMATED: >90 ML/MIN/1.73M2
GLUCOSE SERPL-MCNC: 99 MG/DL (ref 74–99)
HCT VFR BLD AUTO: 34.2 % (ref 36.3–47.1)
HGB BLD-MCNC: 12.1 G/DL (ref 11.9–15.1)
IMM GRANULOCYTES # BLD AUTO: 0.03 K/UL (ref 0–0.3)
IMM GRANULOCYTES NFR BLD: 0 %
LYMPHOCYTES NFR BLD: 0.93 K/UL (ref 1.1–3.7)
LYMPHOCYTES RELATIVE PERCENT: 12 % (ref 24–43)
MCH RBC QN AUTO: 35.6 PG (ref 25.2–33.5)
MCHC RBC AUTO-ENTMCNC: 35.4 G/DL (ref 28.4–34.8)
MCV RBC AUTO: 100.6 FL (ref 82.6–102.9)
MICROORGANISM SPEC CULT: NORMAL
MICROORGANISM SPEC CULT: NORMAL
MONOCYTES NFR BLD: 0.48 K/UL (ref 0.1–1.2)
MONOCYTES NFR BLD: 6 % (ref 3–12)
NEUTROPHILS NFR BLD: 81 % (ref 36–65)
NEUTS SEG NFR BLD: 5.97 K/UL (ref 1.5–8.1)
NRBC BLD-RTO: 0 PER 100 WBC
PLATELET # BLD AUTO: 181 K/UL (ref 138–453)
PMV BLD AUTO: 9.5 FL (ref 8.1–13.5)
POTASSIUM SERPL-SCNC: 3.6 MMOL/L (ref 3.7–5.3)
RBC # BLD AUTO: 3.4 M/UL (ref 3.95–5.11)
SERVICE CMNT-IMP: NORMAL
SERVICE CMNT-IMP: NORMAL
SODIUM SERPL-SCNC: 140 MMOL/L (ref 136–145)
SPECIMEN DESCRIPTION: NORMAL
SPECIMEN DESCRIPTION: NORMAL
WBC OTHER # BLD: 7.5 K/UL (ref 3.5–11.3)

## 2025-05-21 PROCEDURE — 2060000000 HC ICU INTERMEDIATE R&B

## 2025-05-21 PROCEDURE — 99232 SBSQ HOSP IP/OBS MODERATE 35: CPT | Performed by: INTERNAL MEDICINE

## 2025-05-21 PROCEDURE — 86592 SYPHILIS TEST NON-TREP QUAL: CPT

## 2025-05-21 PROCEDURE — 84157 ASSAY OF PROTEIN OTHER: CPT

## 2025-05-21 PROCEDURE — 6370000000 HC RX 637 (ALT 250 FOR IP)

## 2025-05-21 PROCEDURE — 80048 BASIC METABOLIC PNL TOTAL CA: CPT

## 2025-05-21 PROCEDURE — 2500000003 HC RX 250 WO HCPCS

## 2025-05-21 PROCEDURE — 6360000002 HC RX W HCPCS

## 2025-05-21 PROCEDURE — 99232 SBSQ HOSP IP/OBS MODERATE 35: CPT | Performed by: PSYCHIATRY & NEUROLOGY

## 2025-05-21 PROCEDURE — 36415 COLL VENOUS BLD VENIPUNCTURE: CPT

## 2025-05-21 PROCEDURE — 2580000003 HC RX 258

## 2025-05-21 PROCEDURE — 82945 GLUCOSE OTHER FLUID: CPT

## 2025-05-21 PROCEDURE — 85025 COMPLETE CBC W/AUTO DIFF WBC: CPT

## 2025-05-21 RX ORDER — POTASSIUM CHLORIDE 1500 MG/1
20 TABLET, EXTENDED RELEASE ORAL ONCE
Status: DISCONTINUED | OUTPATIENT
Start: 2025-05-21 | End: 2025-05-21

## 2025-05-21 RX ADMIN — SODIUM CHLORIDE: 0.9 INJECTION, SOLUTION INTRAVENOUS at 20:25

## 2025-05-21 RX ADMIN — FOLIC ACID 1 MG: 1 TABLET ORAL at 08:16

## 2025-05-21 RX ADMIN — SODIUM CHLORIDE, PRESERVATIVE FREE 10 ML: 5 INJECTION INTRAVENOUS at 20:26

## 2025-05-21 RX ADMIN — CEFTRIAXONE SODIUM 2000 MG: 2 INJECTION, POWDER, FOR SOLUTION INTRAMUSCULAR; INTRAVENOUS at 23:07

## 2025-05-21 RX ADMIN — QUETIAPINE FUMARATE 25 MG: 25 TABLET ORAL at 20:24

## 2025-05-21 RX ADMIN — CEFTRIAXONE SODIUM 2000 MG: 2 INJECTION, POWDER, FOR SOLUTION INTRAMUSCULAR; INTRAVENOUS at 12:26

## 2025-05-21 RX ADMIN — Medication 100 MG: at 08:16

## 2025-05-21 RX ADMIN — MULTIVITAMIN TABLET 1 TABLET: TABLET at 08:16

## 2025-05-21 ASSESSMENT — PAIN SCALES - GENERAL
PAINLEVEL_OUTOF10: 0

## 2025-05-21 NOTE — CARE COORDINATION
SW cont to follow as dtr concerned about pt's home environment. Pt remains pleasantly confused so unable to assess home situation with bfriend and alcohol use. Noted dtr prefers SNF for pt and CM has sent ref's.   Called dtr, Su, and updated her that SW unable to complete assessment as she has remained confused. SW will cont to follow.

## 2025-05-21 NOTE — PROGRESS NOTES
[facility] -   Infectious diseases evaluation  admission date 5/16/2025    reason for consultation:   Possible meningitis    Impression :   Current:  Found down - acute encephalopathy 5/16 possible meningitis  5/19 LP difficult twice - 2 cc bloody fluid removed - CSF PCR neg  Wakes up confused  MRI initially suspicious for bitemporal high signal - repeat MRI T2 flair less suspicious for infection -  Seizure - possibly post ictal  Etoh abuse concern for Wernicke      Other:  DM2 - asthma-  Discussion / summary of stay / plan of care/ Recommendations:     HENCE:   CSF PCR neg few days after admission - hence stop acyclovir since HSv is less likely  Keep ceftriaxone  5/17 - till 5/23 - 7 days  5/21 LP under anesthesia today    Infection Control Recommendations   [place] Precautions    Antimicrobial Stewardship Recommendations   Simplification of therapy  Targeted therapy  History of Present Illness:   Initial history:  Purvi Ray is a 54 y.o.-year-old female   Found down w a bottle on the street not responding - acute encephalopathy 5/16 - no fever - and WBc low normal- metabolic acidosis noticed and electrolyte abnormalities corrected -  CXR and CTAP chest are all negative  5/19 LP difficult twice - 2 cc bloody fluid removed - CSF PCR neg  Wakes up confused  MRI initially suspicious for bitemporal high signal - repeat MRI T2 flair less suspicious for infection -  Seizure - possibly post ictal  Etoh abuse concern for Wernicke  Metabolic acidosis as well and hypokalemia    Started on acyclovir and ceftriaxone CNS dosing since 5/17  Still confused -  ID called for AB management          Interval changes  5/21/2025   Patient Vitals for the past 8 hrs:   BP Temp Temp src Pulse Resp SpO2   05/21/25 0734 131/84 98.1 °F (36.7 °C) Oral 78 14 100 %   05/21/25 0359 -- -- -- 99 13 98 %   05/21/25 0352 130/83 98.1 °F (36.7 °C) Oral 97 12 98 %   5/21  Still confused- abd soft

## 2025-05-21 NOTE — CARE COORDINATION
Case Management   Daily Progress Note       Patient Name: Purvi Ray                   YOB: 1970  Diagnosis: Hypokalemia [E87.6]  Hypomagnesemia [E83.42]  Altered mental status [R41.82]  Altered mental status, unspecified altered mental status type [R41.82]                       GMLOS: 3.3 days  Length of Stay: 5  days    Anticipated Discharge Date: To be determined    Readmission Risk (Low < 19, Mod (19-27), High > 27): Readmission Risk Score: 11.1        Current Transitional Plan    [] Home Independently    [] Home with HC    [x] Skilled Nursing Facility    [] Acute Rehabilitation    [] Long Term Acute Care (LTAC)    [] Other:     Plan for the Stay (Medical Management) :          Workflow Continuation (Additional Notes) :    Spoke to Naz, pt.'s daughter regarding accepting facilities. She was informed that [facility] and [facility] was able to accept. Naz requested to review the facilities and will have a choice tomorrow. She was informed that f/u will occur tomorrow morning to determine 1st. Choice. She verbalized understanding.     Saman Saldana  May 21, 2025

## 2025-05-21 NOTE — PROGRESS NOTES
[facility] Neurology   IN-PATIENT SERVICE  General Neurology Progress Note   [facility]                Date:   5/21/2025  Patient name:  Purvi Ray  Date of admission:  5/16/2025 10:13 AM  MRN:   [id number]  Account:  [id number]  YOB: 1970  PCP:    Rebecca Leong MD  Room:   [address] [place] [address]  Code Status:    Full Code  Chief Complaint:   Chief Complaint   Patient presents with    Altered Mental Status       Interval history      The patient was seen and examined at bedside this morning.     Continues to be pleasantly confused, LP 5/19 only yielded 2 cc of spinal fluid.  Meningitis/encephalitis panel negative.  ID consulted for antimicrobial recommendations-will DC acyclovir.    Plan to repeat LP with IR under sedation today. Continue thiamine.    Brief History     54-year-old woman with history of hypertension, diabetes mellitus, HSV, and alcohol use with alcohol-related seizures was found down on the street next to a bottle of vodka. Details surrounding the event are unclear. EMS was called for confusion; last known well is unknown. On initial evaluation, patient was alert to name only, unable to state the date, month, or year. She was unable to perform simple calculations or repeat phrases. Cranial nerves were grossly intact, strength was symmetrical. Speech was fluent but comprehension was impaired. No clear dysarthria. She held upper extremities antigravity and withdrew to pain in the lower extremities. NIHSS was 8. BP in the 150s, glucose 128. CT head showed no acute abnormalities.    Differential at presentation included Wernicke’s encephalopathy, alcohol-related delirium, postictal confusion, HSV encephalitis, and toxic-metabolic encephalopathy. Initial labs notable for hypokalemia (2.4), hypomagnesemia (1.5), hypocalcemia (8.2), hyponatremia, metabolic acidosis. Ammonia was 45, TSH 1.53. Toxicology screen was negative.    Following CT, her mental status

## 2025-05-21 NOTE — PLAN OF CARE
Problem: Chronic Conditions and Co-morbidities  Goal: Patient's chronic conditions and co-morbidity symptoms are monitored and maintained or improved  5/21/2025 1706 by Joan Mcclellan RN  Outcome: Progressing  Flowsheets (Taken 5/21/2025 0814)  Care Plan - Patient's Chronic Conditions and Co-Morbidity Symptoms are Monitored and Maintained or Improved:   Monitor and assess patient's chronic conditions and comorbid symptoms for stability, deterioration, or improvement   Collaborate with multidisciplinary team to address chronic and comorbid conditions and prevent exacerbation or deterioration  5/21/2025 0502 by Bell Espino RN  Outcome: Progressing     Problem: Discharge Planning  Goal: Discharge to home or other facility with appropriate resources  5/21/2025 1706 by Joan Mcclellan RN  Outcome: Progressing  Flowsheets (Taken 5/21/2025 0814)  Discharge to home or other facility with appropriate resources:   Identify barriers to discharge with patient and caregiver   Arrange for needed discharge resources and transportation as appropriate   Identify discharge learning needs (meds, wound care, etc)   Refer to discharge planning if patient needs post-hospital services based on physician order or complex needs related to functional status, cognitive ability or social support system  5/21/2025 0502 by Bell Espino RN  Outcome: Progressing     Problem: Pain  Goal: Verbalizes/displays adequate comfort level or baseline comfort level  5/21/2025 1706 by Joan Mcclellan RN  Outcome: Progressing  Flowsheets (Taken 5/21/2025 0734)  Verbalizes/displays adequate comfort level or baseline comfort level:   Encourage patient to monitor pain and request assistance   Assess pain using appropriate pain scale  5/21/2025 0502 by Bell Espino RN  Outcome: Progressing     Problem: Safety - Adult  Goal: Free from fall injury  5/21/2025 1706 by Joan Mcclellan RN  Outcome: Progressing  Flowsheets (Taken 5/21/2025 0700)  Free From Fall Injury:  Instruct family/caregiver on patient safety  5/21/2025 0502 by Bell Espino RN  Outcome: Progressing     Problem: ABCDS Injury Assessment  Goal: Absence of physical injury  5/21/2025 1706 by Joan Mcclellan RN  Outcome: Progressing  Flowsheets (Taken 5/21/2025 0700)  Absence of Physical Injury: Implement safety measures based on patient assessment  5/21/2025 0502 by Bell Espino RN  Outcome: Progressing     Problem: Neurosensory - Adult  Goal: Achieves stable or improved neurological status  Outcome: Progressing  Flowsheets (Taken 5/21/2025 0814)  Achieves stable or improved neurological status:   Assess for and report changes in neurological status   Maintain blood pressure and fluid volume within ordered parameters to optimize cerebral perfusion and minimize risk of hemorrhage  Goal: Absence of seizures  Outcome: Progressing  Flowsheets (Taken 5/21/2025 0814)  Absence of seizures:   Monitor for seizure activity.  If seizure occurs, document type and location of movements and any associated apnea   If seizure occurs, turn head to side and suction secretions as needed  Goal: Remains free of injury related to seizures activity  Outcome: Progressing  Flowsheets (Taken 5/21/2025 0814)  Remains free of injury related to seizure activity:   Maintain airway, patient safety  and administer oxygen as ordered   Monitor patient for seizure activity, document and report duration and description of seizure to Licensed Independent Practitioner  Goal: Achieves maximal functionality and self care  Outcome: Progressing  Flowsheets (Taken 5/21/2025 0814)  Achieves maximal functionality and self care:   Monitor swallowing and airway patency with patient fatigue and changes in neurological status   Encourage and assist patient to increase activity and self care with guidance from physical therapy/occupational therapy     Problem: Cardiovascular - Adult  Goal: Maintains optimal cardiac output and hemodynamic stability  Outcome:

## 2025-05-21 NOTE — PROCEDURES
PROCEDURE NOTE  Date: 5/21/2025   Name: Purvi Ray  YOB: 1970    Procedures    LONG-TERM EEG-VIDEO MONITORING   CLINICAL NEUROPHYSIOLOGY LABORATORY  DEPARTMENT OF NEUROLOGY  [facility]     Patient: Purvi Ray  Age: 54 y.o.  MRN: [id number]    Referring Physician: No ref. provider found  History: The patient is a 54 y.o. female who presented breakthrough seizure/encephalopathy. This long-term video-EEG monitoring study was performed to determine the nature of the patient's clinical events. The patient is on neuroactive medications.   Purvi Ray   Current Facility-Administered Medications   Medication Dose Route Frequency Provider Last Rate Last Admin    LORazepam (ATIVAN) injection 4 mg  4 mg IntraVENous PRN Onesimo Chinchilla MD        thiamine tablet 100 mg  100 mg Oral Daily Onesimo Chinchilla MD   100 mg at 05/21/25 0816    QUEtiapine (SEROQUEL) tablet 25 mg  25 mg Oral Nightly Onesimo Chinchilla MD   25 mg at 05/20/25 2057    folic acid (FOLVITE) tablet 1 mg  1 mg Oral Daily Daisha Faust MD   1 mg at 05/21/25 0816    multivitamin 1 tablet  1 tablet Oral Daily Daisha Faust MD   1 tablet at 05/21/25 0816    cefTRIAXone (ROCEPHIN) 2,000 mg in sterile water 20 mL IV syringe  2,000 mg IntraVENous Q12H Onesimo Chinchilla MD   2,000 mg at 05/20/25 2301    albuterol sulfate HFA (PROVENTIL;VENTOLIN;PROAIR) 108 (90 Base) MCG/ACT inhaler 2 puff  2 puff Inhalation Q6H PRN Gaviota Naqvi MD        sodium chloride flush 0.9 % injection 5-40 mL  5-40 mL IntraVENous 2 times per day Gaviota Naqvi MD   10 mL at 05/18/25 2017    sodium chloride flush 0.9 % injection 5-40 mL  5-40 mL IntraVENous PRN Gaviota Naqvi MD        0.9 % sodium chloride infusion   IntraVENous PRN Gaviota Naqvi MD        potassium chloride (KLOR-CON M) extended release tablet 40 mEq  40 mEq Oral PRN Gaviota Naqvi MD        Or    potassium bicarb-citric acid (EFFER-K) effervescent tablet 40 mEq  40 mEq Oral PRN Gaviota Naqvi MD

## 2025-05-22 ENCOUNTER — APPOINTMENT (OUTPATIENT)
Dept: INTERVENTIONAL RADIOLOGY/VASCULAR | Age: 55
DRG: 897 | End: 2025-05-22
Payer: MEDICARE

## 2025-05-22 LAB
ALB CSF/SERPL: 40.3 RATIO (ref 0–9)
ALBUMIN CSF-MCNC: 131 MG/DL (ref 0–35)
ALBUMIN SERPL-MCNC: 3251 MG/DL (ref 3500–5200)
ANION GAP SERPL CALCULATED.3IONS-SCNC: 10 MMOL/L (ref 9–16)
BASOPHILS # BLD: 0.03 K/UL (ref 0–0.2)
BASOPHILS NFR BLD: 1 % (ref 0–2)
BUN SERPL-MCNC: 8 MG/DL (ref 6–20)
CALCIUM SERPL-MCNC: 9.1 MG/DL (ref 8.6–10.4)
CHLORIDE SERPL-SCNC: 106 MMOL/L (ref 98–107)
CO2 SERPL-SCNC: 24 MMOL/L (ref 20–31)
CREAT SERPL-MCNC: 0.6 MG/DL (ref 0.6–0.9)
EOSINOPHIL # BLD: 0.12 K/UL (ref 0–0.44)
EOSINOPHILS RELATIVE PERCENT: 2 % (ref 1–4)
ERYTHROCYTE [DISTWIDTH] IN BLOOD BY AUTOMATED COUNT: 13.7 % (ref 11.8–14.4)
GFR, ESTIMATED: >90 ML/MIN/1.73M2
GLUCOSE CSF-MCNC: 52 MG/DL (ref 40–70)
GLUCOSE SERPL-MCNC: 93 MG/DL (ref 74–99)
HCT VFR BLD AUTO: 33.7 % (ref 36.3–47.1)
HGB BLD-MCNC: 11.1 G/DL (ref 11.9–15.1)
IGG CSF-MCNC: 33.9 MG/DL (ref 0–6)
IGG SERPL-MCNC: 1212 MG/DL (ref 768–1632)
IGG SYNTH RATE SER+CSF CALC-MRATE: 59.4 MG/D
IGG/ALB CLEAR SER+CSF-RTO: 0.69 RATIO (ref 0.28–0.66)
IGG/ALB CSF: 0.26 RATIO (ref 0.09–0.25)
IMM GRANULOCYTES # BLD AUTO: 0.04 K/UL (ref 0–0.3)
IMM GRANULOCYTES NFR BLD: 1 %
LYMPHOCYTES NFR BLD: 1.06 K/UL (ref 1.1–3.7)
LYMPHOCYTES RELATIVE PERCENT: 20 % (ref 24–43)
MCH RBC QN AUTO: 35 PG (ref 25.2–33.5)
MCHC RBC AUTO-ENTMCNC: 32.9 G/DL (ref 28.4–34.8)
MCV RBC AUTO: 106.3 FL (ref 82.6–102.9)
MICROORGANISM SPEC CULT: NORMAL
MICROORGANISM/AGENT SPEC: NORMAL
MONOCYTES NFR BLD: 0.47 K/UL (ref 0.1–1.2)
MONOCYTES NFR BLD: 9 % (ref 3–12)
NEUTROPHILS NFR BLD: 67 % (ref 36–65)
NEUTS SEG NFR BLD: 3.59 K/UL (ref 1.5–8.1)
NRBC BLD-RTO: 0 PER 100 WBC
OLIGOCLONAL BANDS CSF ELPH-IMP: ABNORMAL
OLIGOCLONAL BANDS CSF ELPH-IMP: NEGATIVE
OLIGOCLONAL BANDS CSF IEF: 0 BANDS (ref 0–1)
PLATELET # BLD AUTO: 182 K/UL (ref 138–453)
PMV BLD AUTO: 9.1 FL (ref 8.1–13.5)
POTASSIUM SERPL-SCNC: 3.6 MMOL/L (ref 3.7–5.3)
PROT CSF-MCNC: 412 MG/DL (ref 15–45)
RBC # BLD AUTO: 3.17 M/UL (ref 3.95–5.11)
RBC # BLD: ABNORMAL 10*6/UL
SODIUM SERPL-SCNC: 140 MMOL/L (ref 136–145)
SPECIMEN DESCRIPTION: NORMAL
WBC OTHER # BLD: 5.3 K/UL (ref 3.5–11.3)

## 2025-05-22 PROCEDURE — 009U3ZX DRAINAGE OF SPINAL CANAL, PERCUTANEOUS APPROACH, DIAGNOSTIC: ICD-10-PCS | Performed by: RADIOLOGY

## 2025-05-22 PROCEDURE — 97535 SELF CARE MNGMENT TRAINING: CPT

## 2025-05-22 PROCEDURE — 6370000000 HC RX 637 (ALT 250 FOR IP)

## 2025-05-22 PROCEDURE — 97110 THERAPEUTIC EXERCISES: CPT

## 2025-05-22 PROCEDURE — 6360000002 HC RX W HCPCS

## 2025-05-22 PROCEDURE — 2500000003 HC RX 250 WO HCPCS

## 2025-05-22 PROCEDURE — 36415 COLL VENOUS BLD VENIPUNCTURE: CPT

## 2025-05-22 PROCEDURE — 85025 COMPLETE CBC W/AUTO DIFF WBC: CPT

## 2025-05-22 PROCEDURE — 99232 SBSQ HOSP IP/OBS MODERATE 35: CPT | Performed by: PSYCHIATRY & NEUROLOGY

## 2025-05-22 PROCEDURE — 97116 GAIT TRAINING THERAPY: CPT

## 2025-05-22 PROCEDURE — 2060000000 HC ICU INTERMEDIATE R&B

## 2025-05-22 PROCEDURE — 62328 DX LMBR SPI PNXR W/FLUOR/CT: CPT

## 2025-05-22 PROCEDURE — B01BYZZ FLUOROSCOPY OF SPINAL CORD USING OTHER CONTRAST: ICD-10-PCS | Performed by: RADIOLOGY

## 2025-05-22 PROCEDURE — 80048 BASIC METABOLIC PNL TOTAL CA: CPT

## 2025-05-22 PROCEDURE — 99232 SBSQ HOSP IP/OBS MODERATE 35: CPT | Performed by: INTERNAL MEDICINE

## 2025-05-22 RX ADMIN — MULTIVITAMIN TABLET 1 TABLET: TABLET at 11:22

## 2025-05-22 RX ADMIN — FOLIC ACID 1 MG: 1 TABLET ORAL at 11:22

## 2025-05-22 RX ADMIN — CEFTRIAXONE SODIUM 2000 MG: 2 INJECTION, POWDER, FOR SOLUTION INTRAMUSCULAR; INTRAVENOUS at 22:24

## 2025-05-22 RX ADMIN — Medication 100 MG: at 11:23

## 2025-05-22 RX ADMIN — SODIUM CHLORIDE, PRESERVATIVE FREE 10 ML: 5 INJECTION INTRAVENOUS at 11:24

## 2025-05-22 RX ADMIN — CEFTRIAXONE SODIUM 2000 MG: 2 INJECTION, POWDER, FOR SOLUTION INTRAMUSCULAR; INTRAVENOUS at 11:31

## 2025-05-22 RX ADMIN — ENOXAPARIN SODIUM 40 MG: 100 INJECTION SUBCUTANEOUS at 11:22

## 2025-05-22 RX ADMIN — IMMUNE GLOBULIN (HUMAN) 20000 MG: 10 INJECTION INTRAVENOUS; SUBCUTANEOUS at 22:32

## 2025-05-22 RX ADMIN — SODIUM CHLORIDE, PRESERVATIVE FREE 10 ML: 5 INJECTION INTRAVENOUS at 22:32

## 2025-05-22 RX ADMIN — LORAZEPAM 4 MG: 2 INJECTION INTRAMUSCULAR; INTRAVENOUS at 09:58

## 2025-05-22 NOTE — PROGRESS NOTES
[facility] Neurology   IN-PATIENT SERVICE  General Neurology Progress Note   [facility]                Date:   5/22/2025  Patient name:  Purvi Ray  Date of admission:  5/16/2025 10:13 AM  MRN:   [id number]  Account:  [id number]  YOB: 1970  PCP:    Rebecca Leong MD  Room:   [address] [place] [address]  Code Status:    Full Code  Chief Complaint:   Chief Complaint   Patient presents with    Altered Mental Status       Interval history      The patient was seen and examined at bedside this morning.     Continues to be pleasantly confused, LP 5/19 only yielded 2 cc of spinal fluid.  Meningitis/encephalitis panel negative.  ID consulted for antimicrobial recommendations recommend to cont ceftriaxone till 5/23  LP was not done yesterday due to lost of IV access, today     Plan to repeat LP with IR under sedation today. Continue thiamine.    Brief History     54-year-old woman with history of hypertension, diabetes mellitus, HSV, and alcohol use with alcohol-related seizures was found down on the street next to a bottle of vodka. Details surrounding the event are unclear. EMS was called for confusion; last known well is unknown. On initial evaluation, patient was alert to name only, unable to state the date, month, or year. She was unable to perform simple calculations or repeat phrases. Cranial nerves were grossly intact, strength was symmetrical. Speech was fluent but comprehension was impaired. No clear dysarthria. She held upper extremities antigravity and withdrew to pain in the lower extremities. NIHSS was 8. BP in the 150s, glucose 128. CT head showed no acute abnormalities.    Differential at presentation included Wernicke’s encephalopathy, alcohol-related delirium, postictal confusion, HSV encephalitis, and toxic-metabolic encephalopathy. Initial labs notable for hypokalemia (2.4), hypomagnesemia (1.5), hypocalcemia (8.2), hyponatremia, metabolic acidosis. Ammonia was 45, TSH

## 2025-05-22 NOTE — CARE COORDINATION
Pre-Cert initiated in [facility] for [facility]. Auth ID: [id number]. Auth is Pending. CM notified.

## 2025-05-22 NOTE — PROGRESS NOTES
Occupational Therapy  Occupational Therapy Daily Treatment Note  Facility/Department: [facility] 1C STEPDOWN   Patient Name: Purvi Ray        MRN: [id number]    : 1970    Date of Service: 2025    Chief Complaint   Patient presents with    Altered Mental Status     Past Medical History:  has a past medical history of Asthma, Diabetes mellitus (HCC), HSV (herpes simplex virus) infection, HTN (hypertension), and Seasonal allergies.  Past Surgical History:  has a past surgical history that includes  section (); Dilation and curettage of uterus; and sterilization (2007).    Discharge Recommendations  Discharge Recommendations: Patient would benefit from continued therapy after discharge  OT Equipment Recommendations  Other: CTA    Assessment  Performance deficits / Impairments: Decreased functional mobility ;Decreased cognition;Decreased high-level IADLs;Decreased ADL status;Decreased balance;Decreased safe awareness  Prognosis: Good  Decision Making: Medium Complexity  REQUIRES OT FOLLOW-UP: Yes  Activity Tolerance  Activity Tolerance: Patient Tolerated treatment well;Treatment limited secondary to medical complications (free text)  Safety Devices  Type of Devices: Call light within reach;Gait belt;Patient at risk for falls;Nurse notified;Telesitter in use;Sitter present;Left in chair  Restraints  Restraints Initially in Place: No    AM-PAC  AM-PAC Daily Activity - Inpatient   How much help is needed for putting on and taking off regular lower body clothing?: A Little  How much help is needed for bathing (which includes washing, rinsing, drying)?: A Little  How much help is needed for toileting (which includes using toilet, bedpan, or urinal)?: A Little  How much help is needed for putting on and taking off regular upper body clothing?: A Little  How much help is needed for taking care of personal grooming?: A Little  How much help for eating meals?: None  AM-PAC Inpatient Daily Activity Raw  assistance;Verbal cueing  Grooming Skilled Clinical Factors: Standing at sink, SBA to ensure stability, max visual/tactile/verbal cues to start/end activity and to stay on task at hand. Brushing teeth/washing face and washing B hands. Pt. replacing caps on bottles that do not mach, multiple times.    Balance  Balance  Sitting:  (Static/dynamic sitting EOB, no LOB.)  Standing:  (Static/dynamic standing, at sink for grooming activity, SBA to ensure stability and safety. No AE. Pt. limited by cognition, not stability this tx session.)    Transfers/Mobility  Bed mobility  Supine to Sit: Supervision  Sit to Supine: Stand by assistance  Bed Mobility Comments: Flat bed, bed rail, cues for initiation of task.         Transfers  Sit to stand: Stand by assistance  Stand to sit: Stand by assistance  Transfer Comments: no device steady, no LOB. EOB->stand->mobility->stood at sink->Mobility->EOB->supine.         Functional Mobility: Stand by assistance  Functional Mobility Skilled Clinical Factors: Mobility- to and from BR/room and hallway distances, SBA, no AE. Cues to pace self around corners and with directional transitions -F carryover              Patient Education  Patient Education  Education Given To: Patient  Education Provided: Role of Therapy;Plan of Care;Transfer Training;Fall Prevention Strategies  Education Provided Comments: Pt educated on safety, task follow through and posture.  Education Method: Demonstration;Verbal  Barriers to Learning: Cognition  Education Outcome: Verbalized understanding;Continued education needed    Goals  Short Term Goals  Time Frame for Short Term Goals: Pt will, by discharge  Short Term Goal 1: complete bed mobility IND  Short Term Goal 2: complete functional mobility SUP  Short Term Goal 3: complete transfers SUP  Short Term Goal 4: complete toileting/LB ADLs SUP  Short Term Goal 5: complete UB ADLs IND    Plan  Occupational Therapy Plan  Times Per Week: 4-5x/wk  Times Per Day: Once a

## 2025-05-22 NOTE — PROGRESS NOTES
Physical Therapy  Facility/Department: [address] STEPDOWN   Physical Therapy Daily Treatment Note    Patient Name: Purvi Ray        MRN: [id number]    : 1970    Date of Service: 2025    Chief Complaint   Patient presents with    Altered Mental Status     Past Medical History:  has a past medical history of Asthma, Diabetes mellitus (HCC), HSV (herpes simplex virus) infection, HTN (hypertension), and Seasonal allergies.  Past Surgical History:  has a past surgical history that includes  section (); Dilation and curettage of uterus; and sterilization (2007).    Discharge Recommendations  Discharge Recommendations: Patient would benefit from continued therapy after discharge  PT Equipment Recommendations  Equipment Needed: No  Other: .    Assessment  Body Structures, Functions, Activity Limitations Requiring Skilled Therapeutic Intervention: Decreased functional mobility ;Decreased ADL status;Decreased endurance;Decreased cognition;Decreased safe awareness;Decreased strength  Assessment: Pt ambulated 260ft with no AD and CGA/SBA, overall fair stability with no true LOB. Pt most limited by decreased cognition. Recommending continued PT to address deficits and maximize safety and independence with mobility and for stair performance.  Therapy Prognosis: Good  Requires PT Follow-Up: Yes  Activity Tolerance  Activity Tolerance: Patient tolerated treatment well;Treatment limited secondary to decreased cognition  Safety Devices  Type of Devices: Call light within reach;Gait belt;Patient at risk for falls;Nurse notified;Telesitter in use;Sitter present;Left in chair  Restraints  Restraints Initially in Place: No    AM-PAC  AM-PAC Basic Mobility - Inpatient   How much help is needed turning from your back to your side while in a flat bed without using bedrails?: None  How much help is needed moving from lying on your back to sitting on the side of a flat bed without using bedrails?: None  How

## 2025-05-22 NOTE — PROGRESS NOTES
Pt presents to IR for LP for diagnosis  Consent obtained from daughter prior to start  4mg ativan given via PIV per order  TIFFANI VALDEZ, Dr. Shankar, CM RT to bedside  Timeout performed  Site prepped and draped, monitor on, pt prone on table  Access obtained, 1cc of blood-tinged CSF drained, specimens collected  Pt unable to tolerate further, site deaccessed and covered with bandaid  Pt transported back to holding via IR team, RN sitter at bedside  Report called to floor DANILO Coto, reminded Chica CARRASCO about the need for yellow top tube for oligoclonal banding and to contact lab to verify that it was drawn today    This RN also spoke with DANILO Franco on 5/21 regarding need for oligoclonal banding yellow top tube to be drawn

## 2025-05-22 NOTE — BRIEF OP NOTE
Brief Postoperative Note Lumbar Puncture    Purvi Ray  YOB: 1970  [id number]    Pre-operative Diagnosis: r/o autoimmune encephalitis    Post-operative Diagnosis: Same    Procedure: Fluoro guided Lumbar Puncture    Anesthesia: 1% Lidocaine    Surgeons/Assistants: Dr. Shankar    Complications: None    EBL: Minimal    Specimens: Obtained and sent to lab    Fluoroscopy guided lumbar puncture performed in IR room 1. Patient was given 4 mg of ativan and continued to move on fluoroscopy table during exam. Procedure terminated after patient continued to move during exam. 20 gauge spinal needle was used to access at levels L3-L4. 1 ml bloody mixed with CSF obtained. Dressing applied. Instructions given.    Electronically signed by Daisha Adhikari PA-C on 5/22/2025 at 11:14 AM       [de-identified] : At this point I recommended a therapeutic injection and under sterile precautions an injection of 5 cc 1% lidocaine with 0.5 cc of Kenalog and 0.5 cc of Dexamethasone - was placed into the joint of the Right knee without complication, and after several minutes, the patient felt significant relief.\par \par  At this point I recommended a therapeutic injection and under sterile precautions an injection of 5 cc 1% lidocaine with 0.5 cc of Kenalog and 0.5 cc of Dexamethasone - was placed into the joint of the Left knee without complication, and after several minutes, the patient felt significant relief.\par

## 2025-05-22 NOTE — CARE COORDINATION
Case Management   Daily Progress Note       Patient Name: Purvi Ray                   YOB: 1970  Diagnosis: Hypokalemia [E87.6]  Hypomagnesemia [E83.42]  Altered mental status [R41.82]  Altered mental status, unspecified altered mental status type [R41.82]                       GMLOS: 3.3 days  Length of Stay: 6  days    Anticipated Discharge Date: To be determined    Readmission Risk (Low < 19, Mod (19-27), High > 27): Readmission Risk Score: 11.2        Current Transitional Plan    [] Home Independently    [] Home with HC    [x] Skilled Nursing Facility    [] Acute Rehabilitation    [] Long Term Acute Care (LTAC)    [] Other:     Plan for the Stay (Medical Management) :    IR-lumbar puncture      Workflow Continuation (Additional Notes) :    Spoke to patient's daughter Naz regarding placement to SNF. She decided to go with [facility]. She was informed that we will start precert today. She was educated that if insurance denies we might have to discharge home. She verbalized understanding. Requested we keep her updated.     Saman Saldana  May 22, 2025

## 2025-05-22 NOTE — PROGRESS NOTES
Patient and all belongings moved from room 138 to room 146.  Daughter Su notified of this move at 2010.

## 2025-05-22 NOTE — PLAN OF CARE
Problem: Safety - Adult  Goal: Free from fall injury  5/22/2025 1804 by Chica Raymond, RN  Outcome: Progressing  5/22/2025 0434 by Aria Muniz, RN  Outcome: Progressing

## 2025-05-22 NOTE — PROGRESS NOTES
Occupational Therapy    [facility]  Occupational Therapy Not Seen Note    DATE: 2025    NAME: Purvi Ray  MRN: [id number]   : 1970      Patient not seen this date for Occupational Therapy due to:    Pt. Off floor for testing, Lumbar Puncture.    Electronically signed by MARIE Martini on 2025 at 11:21 AM

## 2025-05-22 NOTE — PLAN OF CARE
Problem: Chronic Conditions and Co-morbidities  Goal: Patient's chronic conditions and co-morbidity symptoms are monitored and maintained or improved  5/22/2025 0434 by Aria Muniz RN  Outcome: Progressing  5/21/2025 1706 by Joan Mcclellan RN  Outcome: Progressing  Flowsheets (Taken 5/21/2025 0814)  Care Plan - Patient's Chronic Conditions and Co-Morbidity Symptoms are Monitored and Maintained or Improved:   Monitor and assess patient's chronic conditions and comorbid symptoms for stability, deterioration, or improvement   Collaborate with multidisciplinary team to address chronic and comorbid conditions and prevent exacerbation or deterioration     Problem: Discharge Planning  Goal: Discharge to home or other facility with appropriate resources  5/22/2025 0434 by Aria Muniz RN  Outcome: Progressing  5/21/2025 1706 by Joan Mcclellan RN  Outcome: Progressing  Flowsheets (Taken 5/21/2025 0814)  Discharge to home or other facility with appropriate resources:   Identify barriers to discharge with patient and caregiver   Arrange for needed discharge resources and transportation as appropriate   Identify discharge learning needs (meds, wound care, etc)   Refer to discharge planning if patient needs post-hospital services based on physician order or complex needs related to functional status, cognitive ability or social support system     Problem: Pain  Goal: Verbalizes/displays adequate comfort level or baseline comfort level  5/22/2025 0434 by Aria Muniz RN  Outcome: Progressing  5/21/2025 1706 by Joan Mcclellan RN  Outcome: Progressing  Flowsheets (Taken 5/21/2025 0734)  Verbalizes/displays adequate comfort level or baseline comfort level:   Encourage patient to monitor pain and request assistance   Assess pain using appropriate pain scale     Problem: Safety - Adult  Goal: Free from fall injury  5/22/2025 0434 by Aria Muniz RN  Outcome: Progressing  5/21/2025 1706 by Joan Mcclellan

## 2025-05-23 LAB
ANION GAP SERPL CALCULATED.3IONS-SCNC: 11 MMOL/L (ref 9–16)
BASOPHILS # BLD: <0.03 K/UL (ref 0–0.2)
BASOPHILS NFR BLD: 1 % (ref 0–2)
BUN SERPL-MCNC: 6 MG/DL (ref 6–20)
CALCIUM SERPL-MCNC: 9.2 MG/DL (ref 8.6–10.4)
CHLORIDE SERPL-SCNC: 104 MMOL/L (ref 98–107)
CO2 SERPL-SCNC: 22 MMOL/L (ref 20–31)
CREAT SERPL-MCNC: 0.5 MG/DL (ref 0.6–0.9)
EOSINOPHIL # BLD: 0.05 K/UL (ref 0–0.44)
EOSINOPHILS RELATIVE PERCENT: 2 % (ref 1–4)
ERYTHROCYTE [DISTWIDTH] IN BLOOD BY AUTOMATED COUNT: 13.5 % (ref 11.8–14.4)
GFR, ESTIMATED: >90 ML/MIN/1.73M2
GLUCOSE SERPL-MCNC: 99 MG/DL (ref 74–99)
HCT VFR BLD AUTO: 34.8 % (ref 36.3–47.1)
HGB BLD-MCNC: 11.6 G/DL (ref 11.9–15.1)
IMM GRANULOCYTES # BLD AUTO: 0.03 K/UL (ref 0–0.3)
IMM GRANULOCYTES NFR BLD: 1 %
LYMPHOCYTES NFR BLD: 0.83 K/UL (ref 1.1–3.7)
LYMPHOCYTES RELATIVE PERCENT: 25 % (ref 24–43)
MAGNESIUM SERPL-MCNC: 1.8 MG/DL (ref 1.6–2.6)
MCH RBC QN AUTO: 35 PG (ref 25.2–33.5)
MCHC RBC AUTO-ENTMCNC: 33.3 G/DL (ref 28.4–34.8)
MCV RBC AUTO: 105.1 FL (ref 82.6–102.9)
MONOCYTES NFR BLD: 0.3 K/UL (ref 0.1–1.2)
MONOCYTES NFR BLD: 9 % (ref 3–12)
NEUTROPHILS NFR BLD: 63 % (ref 36–65)
NEUTS SEG NFR BLD: 2.1 K/UL (ref 1.5–8.1)
NRBC BLD-RTO: 0 PER 100 WBC
PLATELET # BLD AUTO: 196 K/UL (ref 138–453)
PMV BLD AUTO: 9 FL (ref 8.1–13.5)
POTASSIUM SERPL-SCNC: 3.5 MMOL/L (ref 3.7–5.3)
RBC # BLD AUTO: 3.31 M/UL (ref 3.95–5.11)
RBC # BLD: ABNORMAL 10*6/UL
SODIUM SERPL-SCNC: 137 MMOL/L (ref 136–145)
VDRL CSF QL: NONREACTIVE
WBC OTHER # BLD: 3.3 K/UL (ref 3.5–11.3)

## 2025-05-23 PROCEDURE — 6360000002 HC RX W HCPCS

## 2025-05-23 PROCEDURE — 2060000000 HC ICU INTERMEDIATE R&B

## 2025-05-23 PROCEDURE — 6370000000 HC RX 637 (ALT 250 FOR IP)

## 2025-05-23 PROCEDURE — 99232 SBSQ HOSP IP/OBS MODERATE 35: CPT | Performed by: INTERNAL MEDICINE

## 2025-05-23 PROCEDURE — 2500000003 HC RX 250 WO HCPCS

## 2025-05-23 PROCEDURE — 97530 THERAPEUTIC ACTIVITIES: CPT

## 2025-05-23 PROCEDURE — 36415 COLL VENOUS BLD VENIPUNCTURE: CPT

## 2025-05-23 PROCEDURE — 83735 ASSAY OF MAGNESIUM: CPT

## 2025-05-23 PROCEDURE — 80048 BASIC METABOLIC PNL TOTAL CA: CPT

## 2025-05-23 PROCEDURE — 99232 SBSQ HOSP IP/OBS MODERATE 35: CPT | Performed by: PSYCHIATRY & NEUROLOGY

## 2025-05-23 PROCEDURE — 85025 COMPLETE CBC W/AUTO DIFF WBC: CPT

## 2025-05-23 PROCEDURE — 97116 GAIT TRAINING THERAPY: CPT

## 2025-05-23 RX ADMIN — FOLIC ACID 1 MG: 1 TABLET ORAL at 08:11

## 2025-05-23 RX ADMIN — POTASSIUM BICARBONATE 40 MEQ: 782 TABLET, EFFERVESCENT ORAL at 18:22

## 2025-05-23 RX ADMIN — SODIUM CHLORIDE, PRESERVATIVE FREE 10 ML: 5 INJECTION INTRAVENOUS at 20:57

## 2025-05-23 RX ADMIN — IMMUNE GLOBULIN (HUMAN) 20000 MG: 10 INJECTION INTRAVENOUS; SUBCUTANEOUS at 11:10

## 2025-05-23 RX ADMIN — ACETAMINOPHEN 650 MG: 325 TABLET ORAL at 08:11

## 2025-05-23 RX ADMIN — QUETIAPINE FUMARATE 25 MG: 25 TABLET ORAL at 20:57

## 2025-05-23 RX ADMIN — Medication 100 MG: at 08:11

## 2025-05-23 RX ADMIN — SODIUM CHLORIDE, PRESERVATIVE FREE 10 ML: 5 INJECTION INTRAVENOUS at 08:12

## 2025-05-23 RX ADMIN — CEFTRIAXONE SODIUM 2000 MG: 2 INJECTION, POWDER, FOR SOLUTION INTRAMUSCULAR; INTRAVENOUS at 11:14

## 2025-05-23 RX ADMIN — MULTIVITAMIN TABLET 1 TABLET: TABLET at 08:11

## 2025-05-23 ASSESSMENT — PAIN DESCRIPTION - LOCATION: LOCATION: KNEE

## 2025-05-23 ASSESSMENT — PAIN SCALES - GENERAL: PAINLEVEL_OUTOF10: 3

## 2025-05-23 ASSESSMENT — PAIN DESCRIPTION - DESCRIPTORS: DESCRIPTORS: SORE

## 2025-05-23 ASSESSMENT — PAIN DESCRIPTION - ORIENTATION: ORIENTATION: RIGHT;LEFT

## 2025-05-23 NOTE — PROGRESS NOTES
Physical Therapy  Facility/Department: [address] STEPDOWN   Physical Therapy Daily Treatment Note    Patient Name: Purvi Ray        MRN: [id number]    : 1970    Date of Service: 2025    Chief Complaint   Patient presents with    Altered Mental Status     Past Medical History:  has a past medical history of Asthma, Diabetes mellitus (HCC), HSV (herpes simplex virus) infection, HTN (hypertension), and Seasonal allergies.  Past Surgical History:  has a past surgical history that includes  section (); Dilation and curettage of uterus; and sterilization (2007).    Discharge Recommendations  Discharge Recommendations: Patient would benefit from continued therapy after discharge  PT Equipment Recommendations  Equipment Needed: No  Other: .    Assessment  Body Structures, Functions, Activity Limitations Requiring Skilled Therapeutic Intervention: Decreased functional mobility ;Decreased ADL status;Decreased endurance;Decreased cognition;Decreased safe awareness;Decreased strength  Assessment: Pt ambulated 100ft x2 with no AD CGA, required max cueing for direction and safety. Pt also performed a flight of stairs with B HR and CGA. Pt most limited by significant decreased cognition, needing cueing throughout for safety d/t impulsiveness and also attempting to enter other patients room d/t confusion. Recommending continued PT to address deficits and maximize safety and independence with mobility. Pt is a fall risk d/t impulsivity.  Therapy Prognosis: Good  Requires PT Follow-Up: Yes  Activity Tolerance  Activity Tolerance: Patient tolerated treatment well;Treatment limited secondary to decreased cognition  Safety Devices  Type of Devices: Call light within reach;Gait belt;Patient at risk for falls;Nurse notified;Telesitter in use;Sitter present;Left in bed  Restraints  Restraints Initially in Place: No    AM-PAC  AM-PAC Basic Mobility - Inpatient   How much help is needed turning from your back

## 2025-05-23 NOTE — PROGRESS NOTES
Comprehensive Nutrition Assessment    Type and Reason for Visit:  Initial, LOS    Nutrition Recommendations/Plan:   Regular diet.      Malnutrition Assessment:  Malnutrition Status:  No malnutrition (05/23/25 1049)    Context:  Acute Illness       Nutrition Assessment:    Patient is seen today for length of stay. Patient is a 54 y.o. female admitted for altered mental status. PMHx: HTN, back pain, alcohol abuse. Regular diet order in place. Weight hx reviewed, it appears patient has a hx of weight loss ~3 years ago but no recent weights to trend recent weight hx. Patient was eating breakfast during visit. Patient endorses good appetite/po intake and denies any recent weight loss.    Nutrition Related Findings:    Meds/labs reviewed. Wound Type: None       Current Nutrition Intake & Therapies:    Average Meal Intake: %  Average Supplements Intake: None Ordered  ADULT DIET; Regular    Anthropometric Measures:  Height: 157.5 cm (5' 2\")  Ideal Body Weight (IBW): 110 lbs (50 kg)    Current Body Weight: 83.8 kg (184 lb 11.9 oz),   IBW. Weight Source: Other (estimated)  Current BMI (kg/m2): 33.8  Weight Adjustment For: No Adjustment    Estimated Daily Nutrient Needs:  Energy Requirements Based On: Kcal/kg  Weight Used for Energy Requirements: Current  Energy (kcal/day): 1600-1800kcals/dy  Weight Used for Protein Requirements: Ideal  Protein (g/day): 50-60g protein/day  Method Used for Fluid Requirements: 1 ml/kcal  Fluid (ml/day): or per medical team    Nutrition Diagnosis:   No nutrition diagnosis at this time related to   as evidenced by      Nutrition Interventions:   Food and/or Nutrient Delivery: Continue Current Diet  Nutrition Education/Counseling: No recommendation at this time  Coordination of Nutrition Care: Continue to monitor while inpatient  Plan of Care discussed with: patient    Goals:  Goals: Meet at least 75% of estimated needs, prior to discharge  Type of Goal: New goal  Previous Goal Met: New

## 2025-05-23 NOTE — CARE COORDINATION
Case Management   Daily Progress Note       Patient Name: Purvi Ray                   YOB: 1970  Diagnosis: Hypokalemia [E87.6]  Hypomagnesemia [E83.42]  Altered mental status [R41.82]  Altered mental status, unspecified altered mental status type [R41.82]                       GMLOS: 3.3 days  Length of Stay: 7  days    Anticipated Discharge Date: To be determined    Readmission Risk (Low < 19, Mod (19-27), High > 27): Readmission Risk Score: 12        Current Transitional Plan    [] Home Independently    [] Home with HC    [x] Skilled Nursing Facility    [] Acute Rehabilitation    [] Long Term Acute Care (LTAC)    [] Other:     Plan for the Stay (Medical Management) :          Workflow Continuation (Additional Notes) :    P2P offer on SNF placement to [facility] house offered. Deadline to call is 5pm today. Contact number is: [phone] opt 5.     Purvi Ray   1970  Mbr ID: [id number]     Received a call from insurance requesting updated PT/OT notes be faxed to: [phone] or uploaded on the portal.     PT notes faxed. Need updated OT notes.     Saman Saldana  May 23, 2025

## 2025-05-23 NOTE — PROGRESS NOTES
[facility] Neurology   IN-PATIENT SERVICE  General Neurology Progress Note   [facility]                Date:   5/23/2025  Patient name:  Purvi Ray  Date of admission:  5/16/2025 10:13 AM  MRN:   [id number]  Account:  [id number]  YOB: 1970  PCP:    Rebecca Leong MD  Room:   [address] [place] [address]  Code Status:    Full Code  Chief Complaint:   Chief Complaint   Patient presents with    Altered Mental Status       Interval history      The patient was seen and examined at bedside this morning. Her neuro exam and mentation hs been stable without further decline or improvement over the past 3-4 days    Continues to be pleasantly confused, LP 5/19 only yielded 2 cc of spinal fluid.  Meningitis/encephalitis panel negative.  ID consulted for antimicrobial recommendations recommend to cont ceftriaxone till 5/23  LP was not done yesterday due to lost of IV access, today     5/22 unsuccessful LP by IR despite 4 milligram of Ativan, 1 mL of bloody mixed with CSF obtained, protein CSF significantly elevated 412, glucose 52.      Brief History     54-year-old woman with history of hypertension, diabetes mellitus, HSV, and alcohol use with alcohol-related seizures was found down on the street next to a bottle of vodka. Details surrounding the event are unclear. EMS was called for confusion; last known well is unknown. On initial evaluation, patient was alert to name only, unable to state the date, month, or year. She was unable to perform simple calculations or repeat phrases. Cranial nerves were grossly intact, strength was symmetrical. Speech was fluent but comprehension was impaired. No clear dysarthria. She held upper extremities antigravity and withdrew to pain in the lower extremities. NIHSS was 8. BP in the 150s, glucose 128. CT head showed no acute abnormalities.    Differential at presentation included Wernicke’s encephalopathy, alcohol-related delirium, postictal confusion, HSV  alcohol use. She reports that she does not use drugs.    Medications:  Prior to Admission medications    Medication Sig Start Date End Date Taking? Authorizing Provider   folic acid (FOLVITE) 1 MG tablet Take 1 tablet by mouth daily 10/26/23   Brooke Haider MD   vitamin B-1 (THIAMINE) 100 MG tablet Take 1 tablet by mouth daily 10/25/23   Brooke Haider MD   FEROSUL 325 (65 Fe) MG tablet take 1 tablet by mouth twice a day 1/5/21   Rebecca Leong MD   budesonide-formoterol (SYMBICORT) 160-4.5 MCG/ACT AERO Inhale 2 puffs into the lungs 2 times daily  Patient not taking: Reported on 10/21/2023 10/9/20   Rebecca Leong MD   albuterol sulfate HFA (PROAIR HFA) 108 (90 Base) MCG/ACT inhaler Inhale 2 puffs into the lungs every 6 hours as needed for Wheezing 10/9/20   Rebecca Leong MD   blood glucose monitor strips E11.9 3/24/20   Rebecca Leong MD   Blood Glucose Monitoring Suppl KIT 1 each by Does not apply route 2 times daily E 11.9 3/24/20   Rebecca Leong MD   acetaminophen (TYLENOL) 325 MG tablet Take 2 tablets by mouth every 6 hours as needed for Pain 3/22/20   Nikolay Berry,    fluticasone (FLONASE) 50 MCG/ACT nasal spray 2 sprays by Nasal route daily 12/13/19   Kristina Elmore, APRN - CNP   lansoprazole (PREVACID) 30 MG delayed release capsule Take 1 capsule by mouth daily  Patient not taking: Reported on 10/21/2023 3/21/18   Rebecca Leong MD   Lancets MISC Use once or twice daily 5/6/17   Chucho Lilly MD   albuterol (PROVENTIL) (2.5 MG/3ML) 0.083% nebulizer solution Take 3 mLs by nebulization every 6 hours as needed for Wheezing 2/7/17   Rebecca Leong MD     Current Facility-Administered Medications   Medication Dose Route Frequency Provider Last Rate Last Admin    immune globulin (GAMUNEX-C) 10% solution 20,000 mg  400 mg/kg (Ideal) IntraVENous Daily Onesimo Chinchilla MD 75 mL/hr at 05/22/25 2341 Rate Change at 05/22/25 2341    thiamine tablet 100 mg  100 mg Oral Daily Onesimo Chinchilla MD   100 mg

## 2025-05-23 NOTE — PLAN OF CARE
Problem: Chronic Conditions and Co-morbidities  Goal: Patient's chronic conditions and co-morbidity symptoms are monitored and maintained or improved  Outcome: Progressing     Problem: Discharge Planning  Goal: Discharge to home or other facility with appropriate resources  Outcome: Progressing     Problem: Pain  Goal: Verbalizes/displays adequate comfort level or baseline comfort level  Outcome: Progressing     Problem: Safety - Adult  Goal: Free from fall injury  5/23/2025 0525 by Aria Muniz, RN  Outcome: Progressing  5/22/2025 1804 by Chica Raymond RN  Outcome: Progressing     Problem: ABCDS Injury Assessment  Goal: Absence of physical injury  Outcome: Progressing     Problem: Neurosensory - Adult  Goal: Achieves stable or improved neurological status  Outcome: Progressing  Goal: Absence of seizures  Outcome: Progressing  Goal: Remains free of injury related to seizures activity  Outcome: Progressing  Goal: Achieves maximal functionality and self care  Outcome: Progressing     Problem: Cardiovascular - Adult  Goal: Maintains optimal cardiac output and hemodynamic stability  Outcome: Progressing  Goal: Absence of cardiac dysrhythmias or at baseline  Outcome: Progressing     Problem: Skin/Tissue Integrity - Adult  Goal: Skin integrity remains intact  Outcome: Progressing  Goal: Incisions, wounds, or drain sites healing without S/S of infection  Outcome: Progressing  Goal: Oral mucous membranes remain intact  Outcome: Progressing     Problem: Genitourinary - Adult  Goal: Absence of urinary retention  Outcome: Progressing

## 2025-05-23 NOTE — ADT AUTH CERT
Please remove DC date from [facility] portal. Patient is still in house at our facility. Thank you!

## 2025-05-23 NOTE — PROGRESS NOTES
[facility] -   Infectious diseases evaluation  admission date 5/16/2025    reason for consultation:   Possible meningitis    Impression :   Current:  Found down - acute encephalopathy 5/16 possible meningitis  5/19 LP difficult twice - 2 cc bloody fluid removed - CSF PCR neg  Wakes up confused  MRI initially suspicious for bitemporal high signal - repeat MRI T2 flair less suspicious for infection -  Seizure - possibly post ictal  Etoh abuse concern for Wernicke      Other:  DM2 - asthma-  Discussion / summary of stay / plan of care/ Recommendations:     HENCE:   CSF PCR neg few days after admission - hence stop acyclovir since HSv is less likely  Keep ceftriaxone  5/17 - till 5/23 - 7 days - completed  5/21 LP under anesthesia today, 2 CC fluid  only - limited studies    Infection Control Recommendations   [place] Precautions    Antimicrobial Stewardship Recommendations   Simplification of therapy  Targeted therapy  History of Present Illness:   Initial history:  Purvi Ray is a 54 y.o.-year-old female   Found down w a bottle on the street not responding - acute encephalopathy 5/16 - no fever - and WBc low normal- metabolic acidosis noticed and electrolyte abnormalities corrected -  CXR and CTAP chest are all negative  5/19 LP difficult twice - 2 cc bloody fluid removed - CSF PCR neg  Wakes up confused  MRI initially suspicious for bitemporal high signal - repeat MRI T2 flair less suspicious for infection -  Seizure - possibly post ictal  Etoh abuse concern for Wernicke  Metabolic acidosis as well and hypokalemia    Started on acyclovir and ceftriaxone CNS dosing since 5/17  Still confused -  ID called for AB management          Interval changes  5/23/2025   Patient Vitals for the past 8 hrs:   BP Temp Temp src Pulse Resp SpO2   05/23/25 1145 138/85 97.9 °F (36.6 °C) Oral -- -- 100 %   05/23/25 0745 132/81 98.2 °F (36.8 °C) Oral 87 14 100 %   5/21  Still

## 2025-05-23 NOTE — PROGRESS NOTES
[facility] -   Infectious diseases evaluation  admission date 5/16/2025    reason for consultation:   Possible meningitis    Impression :   Current:  Found down - acute encephalopathy 5/16 possible meningitis  5/19 LP difficult twice - 2 cc bloody fluid removed - CSF PCR neg  Wakes up confused  MRI initially suspicious for bitemporal high signal - repeat MRI T2 flair less suspicious for infection -  Seizure - possibly post ictal  Etoh abuse concern for Wernicke      Other:  DM2 - asthma-  Discussion / summary of stay / plan of care/ Recommendations:     HENCE:   CSF PCR neg few days after admission - hence stop acyclovir since HSv is less likely  Keep ceftriaxone  5/17 - till 5/23 - 7 days  5/21 LP under anesthesia today, 2 CC fluid  only - limited studies    Infection Control Recommendations   [place] Precautions    Antimicrobial Stewardship Recommendations   Simplification of therapy  Targeted therapy  History of Present Illness:   Initial history:  Purvi Ray is a 54 y.o.-year-old female   Found down w a bottle on the street not responding - acute encephalopathy 5/16 - no fever - and WBc low normal- metabolic acidosis noticed and electrolyte abnormalities corrected -  CXR and CTAP chest are all negative  5/19 LP difficult twice - 2 cc bloody fluid removed - CSF PCR neg  Wakes up confused  MRI initially suspicious for bitemporal high signal - repeat MRI T2 flair less suspicious for infection -  Seizure - possibly post ictal  Etoh abuse concern for Wernicke  Metabolic acidosis as well and hypokalemia    Started on acyclovir and ceftriaxone CNS dosing since 5/17  Still confused -  ID called for AB management          Interval changes  5/22/2025   Patient Vitals for the past 8 hrs:   BP Temp Temp src Pulse Resp SpO2   05/22/25 2000 (!) 143/92 97.7 °F (36.5 °C) Oral 86 18 --   05/22/25 1745 -- -- -- 84 -- 100 %   05/22/25 1604 (!) 145/99 97.4 °F (36.3 °C) Oral

## 2025-05-23 NOTE — PLAN OF CARE
Problem: Chronic Conditions and Co-morbidities  Goal: Patient's chronic conditions and co-morbidity symptoms are monitored and maintained or improved  5/23/2025 1842 by Milton Chun RN  Outcome: Progressing  Flowsheets (Taken 5/23/2025 0800)  Care Plan - Patient's Chronic Conditions and Co-Morbidity Symptoms are Monitored and Maintained or Improved: Monitor and assess patient's chronic conditions and comorbid symptoms for stability, deterioration, or improvement  5/23/2025 0525 by Aria Muniz RN  Outcome: Progressing     Problem: Discharge Planning  Goal: Discharge to home or other facility with appropriate resources  5/23/2025 1842 by Milton Chun RN  Outcome: Progressing  Flowsheets (Taken 5/23/2025 0800)  Discharge to home or other facility with appropriate resources: Identify barriers to discharge with patient and caregiver  5/23/2025 0525 by Aria Munzi RN  Outcome: Progressing     Problem: Pain  Goal: Verbalizes/displays adequate comfort level or baseline comfort level  5/23/2025 1842 by Milton Chun RN  Outcome: Progressing  Flowsheets  Taken 5/23/2025 1145  Verbalizes/displays adequate comfort level or baseline comfort level: Encourage patient to monitor pain and request assistance  Taken 5/23/2025 0745  Verbalizes/displays adequate comfort level or baseline comfort level: Encourage patient to monitor pain and request assistance  5/23/2025 0525 by Aria Muniz RN  Outcome: Progressing     Problem: Safety - Adult  Goal: Free from fall injury  5/23/2025 1842 by Milton Chun RN  Outcome: Progressing  Flowsheets (Taken 5/23/2025 0800)  Free From Fall Injury: Instruct family/caregiver on patient safety  5/23/2025 0525 by Aria Muniz RN  Outcome: Progressing     Problem: ABCDS Injury Assessment  Goal: Absence of physical injury  5/23/2025 1842 by Milton Chun RN  Outcome: Progressing  Flowsheets (Taken 5/23/2025 0800)  Absence of Physical Injury: Implement safety

## 2025-05-23 NOTE — CARE COORDINATION
[facility] Auth ID[facility] [id number]

## 2025-05-24 LAB
ANION GAP SERPL CALCULATED.3IONS-SCNC: 11 MMOL/L (ref 9–16)
BASOPHILS # BLD: <0.03 K/UL (ref 0–0.2)
BASOPHILS NFR BLD: 1 % (ref 0–2)
BUN SERPL-MCNC: 16 MG/DL (ref 6–20)
CALCIUM SERPL-MCNC: 8.9 MG/DL (ref 8.6–10.4)
CHLORIDE SERPL-SCNC: 106 MMOL/L (ref 98–107)
CO2 SERPL-SCNC: 22 MMOL/L (ref 20–31)
CREAT SERPL-MCNC: 0.7 MG/DL (ref 0.6–0.9)
EOSINOPHIL # BLD: 0.11 K/UL (ref 0–0.44)
EOSINOPHILS RELATIVE PERCENT: 3 % (ref 1–4)
ERYTHROCYTE [DISTWIDTH] IN BLOOD BY AUTOMATED COUNT: 13.7 % (ref 11.8–14.4)
GFR, ESTIMATED: >90 ML/MIN/1.73M2
GLUCOSE SERPL-MCNC: 95 MG/DL (ref 74–99)
HCT VFR BLD AUTO: 34.5 % (ref 36.3–47.1)
HGB BLD-MCNC: 11.3 G/DL (ref 11.9–15.1)
IMM GRANULOCYTES # BLD AUTO: <0.03 K/UL (ref 0–0.3)
IMM GRANULOCYTES NFR BLD: 1 %
LYMPHOCYTES NFR BLD: 1.35 K/UL (ref 1.1–3.7)
LYMPHOCYTES RELATIVE PERCENT: 31 % (ref 24–43)
MCH RBC QN AUTO: 34.7 PG (ref 25.2–33.5)
MCHC RBC AUTO-ENTMCNC: 32.8 G/DL (ref 28.4–34.8)
MCV RBC AUTO: 105.8 FL (ref 82.6–102.9)
MONOCYTES NFR BLD: 0.58 K/UL (ref 0.1–1.2)
MONOCYTES NFR BLD: 13 % (ref 3–12)
NEUTROPHILS NFR BLD: 51 % (ref 36–65)
NEUTS SEG NFR BLD: 2.27 K/UL (ref 1.5–8.1)
NRBC BLD-RTO: 0 PER 100 WBC
PLATELET # BLD AUTO: 204 K/UL (ref 138–453)
PMV BLD AUTO: 9.2 FL (ref 8.1–13.5)
POTASSIUM SERPL-SCNC: 4.2 MMOL/L (ref 3.7–5.3)
RBC # BLD AUTO: 3.26 M/UL (ref 3.95–5.11)
RBC # BLD: ABNORMAL 10*6/UL
SODIUM SERPL-SCNC: 139 MMOL/L (ref 136–145)
WBC OTHER # BLD: 4.4 K/UL (ref 3.5–11.3)

## 2025-05-24 PROCEDURE — 2500000003 HC RX 250 WO HCPCS: Performed by: INTERNAL MEDICINE

## 2025-05-24 PROCEDURE — 99232 SBSQ HOSP IP/OBS MODERATE 35: CPT | Performed by: INTERNAL MEDICINE

## 2025-05-24 PROCEDURE — 6360000002 HC RX W HCPCS: Performed by: INTERNAL MEDICINE

## 2025-05-24 PROCEDURE — 6360000002 HC RX W HCPCS

## 2025-05-24 PROCEDURE — 80048 BASIC METABOLIC PNL TOTAL CA: CPT

## 2025-05-24 PROCEDURE — 97535 SELF CARE MNGMENT TRAINING: CPT

## 2025-05-24 PROCEDURE — 6370000000 HC RX 637 (ALT 250 FOR IP)

## 2025-05-24 PROCEDURE — 99232 SBSQ HOSP IP/OBS MODERATE 35: CPT | Performed by: PSYCHIATRY & NEUROLOGY

## 2025-05-24 PROCEDURE — 2500000003 HC RX 250 WO HCPCS

## 2025-05-24 PROCEDURE — 36415 COLL VENOUS BLD VENIPUNCTURE: CPT

## 2025-05-24 PROCEDURE — 2060000000 HC ICU INTERMEDIATE R&B

## 2025-05-24 PROCEDURE — 85025 COMPLETE CBC W/AUTO DIFF WBC: CPT

## 2025-05-24 RX ADMIN — MULTIVITAMIN TABLET 1 TABLET: TABLET at 09:31

## 2025-05-24 RX ADMIN — SODIUM CHLORIDE, PRESERVATIVE FREE 10 ML: 5 INJECTION INTRAVENOUS at 09:31

## 2025-05-24 RX ADMIN — QUETIAPINE FUMARATE 25 MG: 25 TABLET ORAL at 21:21

## 2025-05-24 RX ADMIN — FOLIC ACID 1 MG: 1 TABLET ORAL at 09:31

## 2025-05-24 RX ADMIN — IMMUNE GLOBULIN (HUMAN) 20000 MG: 10 INJECTION INTRAVENOUS; SUBCUTANEOUS at 09:46

## 2025-05-24 RX ADMIN — CEFTRIAXONE SODIUM 2000 MG: 2 INJECTION, POWDER, FOR SOLUTION INTRAMUSCULAR; INTRAVENOUS at 00:05

## 2025-05-24 RX ADMIN — SODIUM CHLORIDE, PRESERVATIVE FREE 10 ML: 5 INJECTION INTRAVENOUS at 21:21

## 2025-05-24 RX ADMIN — Medication 100 MG: at 09:31

## 2025-05-24 NOTE — PLAN OF CARE
Problem: Chronic Conditions and Co-morbidities  Goal: Patient's chronic conditions and co-morbidity symptoms are monitored and maintained or improved  Outcome: Progressing  Flowsheets (Taken 5/24/2025 0800)  Care Plan - Patient's Chronic Conditions and Co-Morbidity Symptoms are Monitored and Maintained or Improved: Monitor and assess patient's chronic conditions and comorbid symptoms for stability, deterioration, or improvement     Problem: Discharge Planning  Goal: Discharge to home or other facility with appropriate resources  Outcome: Progressing  Flowsheets (Taken 5/24/2025 0800)  Discharge to home or other facility with appropriate resources: Identify barriers to discharge with patient and caregiver     Problem: Pain  Goal: Verbalizes/displays adequate comfort level or baseline comfort level  Outcome: Progressing  Flowsheets  Taken 5/24/2025 1550  Verbalizes/displays adequate comfort level or baseline comfort level: Encourage patient to monitor pain and request assistance  Taken 5/24/2025 1253  Verbalizes/displays adequate comfort level or baseline comfort level: Encourage patient to monitor pain and request assistance  Taken 5/24/2025 0729  Verbalizes/displays adequate comfort level or baseline comfort level: Encourage patient to monitor pain and request assistance     Problem: Safety - Adult  Goal: Free from fall injury  Outcome: Progressing  Flowsheets  Taken 5/24/2025 0800 by Milton Chun RN  Free From Fall Injury: Instruct family/caregiver on patient safety  Taken 5/24/2025 0433 by Alicia Chaudhari RN  Free From Fall Injury: Instruct family/caregiver on patient safety     Problem: ABCDS Injury Assessment  Goal: Absence of physical injury  Outcome: Progressing  Flowsheets  Taken 5/24/2025 0800 by Milton Chun RN  Absence of Physical Injury: Implement safety measures based on patient assessment  Taken 5/24/2025 0433 by Alicia Chaudhari, RN  Absence of Physical Injury: Implement safety  measures based on patient assessment     Problem: Neurosensory - Adult  Goal: Achieves stable or improved neurological status  Outcome: Progressing  Flowsheets (Taken 5/24/2025 0800)  Achieves stable or improved neurological status: Assess for and report changes in neurological status  Goal: Absence of seizures  Outcome: Progressing  Flowsheets (Taken 5/24/2025 0800)  Absence of seizures: Monitor for seizure activity.  If seizure occurs, document type and location of movements and any associated apnea  Goal: Remains free of injury related to seizures activity  Outcome: Progressing  Flowsheets (Taken 5/24/2025 0800)  Remains free of injury related to seizure activity: Maintain airway, patient safety  and administer oxygen as ordered  Goal: Achieves maximal functionality and self care  Outcome: Progressing  Flowsheets (Taken 5/24/2025 0800)  Achieves maximal functionality and self care: Monitor swallowing and airway patency with patient fatigue and changes in neurological status     Problem: Cardiovascular - Adult  Goal: Maintains optimal cardiac output and hemodynamic stability  Outcome: Progressing  Flowsheets (Taken 5/24/2025 0800)  Maintains optimal cardiac output and hemodynamic stability: Monitor blood pressure and heart rate  Goal: Absence of cardiac dysrhythmias or at baseline  Outcome: Progressing  Flowsheets (Taken 5/24/2025 0800)  Absence of cardiac dysrhythmias or at baseline: Monitor cardiac rate and rhythm     Problem: Skin/Tissue Integrity - Adult  Goal: Skin integrity remains intact  Outcome: Progressing  Flowsheets (Taken 5/24/2025 0800)  Skin Integrity Remains Intact: Monitor for areas of redness and/or skin breakdown  Goal: Incisions, wounds, or drain sites healing without S/S of infection  Outcome: Progressing  Flowsheets (Taken 5/24/2025 0800)  Incisions, Wounds, or Drain Sites Healing Without Sign and Symptoms of Infection: ADMISSION and DAILY: Assess and document risk factors for pressure ulcer

## 2025-05-24 NOTE — PLAN OF CARE
Problem: Chronic Conditions and Co-morbidities  Goal: Patient's chronic conditions and co-morbidity symptoms are monitored and maintained or improved  5/24/2025 0027 by Alicia Chaudhari RN  Outcome: Progressing  5/23/2025 1842 by Milton Chun RN  Outcome: Progressing  Flowsheets (Taken 5/23/2025 0800)  Care Plan - Patient's Chronic Conditions and Co-Morbidity Symptoms are Monitored and Maintained or Improved: Monitor and assess patient's chronic conditions and comorbid symptoms for stability, deterioration, or improvement     Problem: Discharge Planning  Goal: Discharge to home or other facility with appropriate resources  5/24/2025 0027 by Alicia Chaudhari RN  Outcome: Progressing  5/23/2025 1842 by Milton Chun RN  Outcome: Progressing  Flowsheets (Taken 5/23/2025 0800)  Discharge to home or other facility with appropriate resources: Identify barriers to discharge with patient and caregiver     Problem: Pain  Goal: Verbalizes/displays adequate comfort level or baseline comfort level  5/24/2025 0027 by Alicia Chaudhari RN  Outcome: Progressing  5/23/2025 1842 by Milton Chun RN  Outcome: Progressing  Flowsheets  Taken 5/23/2025 1145  Verbalizes/displays adequate comfort level or baseline comfort level: Encourage patient to monitor pain and request assistance  Taken 5/23/2025 0745  Verbalizes/displays adequate comfort level or baseline comfort level: Encourage patient to monitor pain and request assistance     Problem: Safety - Adult  Goal: Free from fall injury  5/24/2025 0027 by Alicia Chaudhari RN  Outcome: Progressing  5/23/2025 1842 by Milton Chun RN  Outcome: Progressing  Flowsheets (Taken 5/23/2025 0800)  Free From Fall Injury: Instruct family/caregiver on patient safety     Problem: ABCDS Injury Assessment  Goal: Absence of physical injury  5/24/2025 0027 by Alicia Chaudhari RN  Outcome: Progressing  5/23/2025 1842 by Milton Chun RN  Outcome:

## 2025-05-24 NOTE — CARE COORDINATION
Case Management   Daily Progress Note       Patient Name: Purvi Ray                   YOB: 1970  Diagnosis: Hypokalemia [E87.6]  Hypomagnesemia [E83.42]  Altered mental status [R41.82]  Altered mental status, unspecified altered mental status type [R41.82]                       GMLOS: 3.3 days  Length of Stay: 8  days    Anticipated Discharge Date: To be determined    Readmission Risk (Low < 19, Mod (19-27), High > 27): Readmission Risk Score: 7.8        Current Transitional Plan    [] Home Independently    [] Home with HC    [x] Skilled Nursing Facility    [] Acute Rehabilitation    [] Long Term Acute Care (LTAC)    [] Other:     Plan for the Stay (Medical Management) :          Workflow Continuation (Additional Notes) : updated OT note uploaded into [employer]        Diana Serna RN  May 24, 2025

## 2025-05-24 NOTE — PROGRESS NOTES
Occupational Therapy  Occupational Therapy Daily Treatment Note  Facility/Department: [address] STE[address]OWN   Patient Name: Purvi Ray        MRN: [id number]    : 1970    Date of Service: 2025    Chief Complaint   Patient presents with    Altered Mental Status     Past Medical History:  has a past medical history of Asthma, Diabetes mellitus (HCC), HSV (herpes simplex virus) infection, HTN (hypertension), and Seasonal allergies.  Past Surgical History:  has a past surgical history that includes  section (); Dilation and curettage of uterus; and sterilization (2007).    Discharge Recommendations  Discharge Recommendations: Patient would benefit from continued therapy after discharge  OT Equipment Recommendations  Equipment Needed:  (Medical alert system)    Assessment  Performance deficits / Impairments: Decreased functional mobility ;Decreased cognition;Decreased high-level IADLs;Decreased ADL status;Decreased balance;Decreased safe awareness  Prognosis: Good  Decision Making: Medium Complexity  REQUIRES OT FOLLOW-UP: Yes  Activity Tolerance  Activity Tolerance: Patient Tolerated treatment well  Safety Devices  Type of Devices: Call light within reach;Gait belt;Patient at risk for falls;Nurse notified;Sitter present;Left in chair;Chair alarm in place  Restraints  Restraints Initially in Place: No    AM-PAC  AM-[facility] Daily Activity - Inpatient   How much help is needed for putting on and taking off regular lower body clothing?: A Little  How much help is needed for bathing (which includes washing, rinsing, drying)?: A Little  How much help is needed for toileting (which includes using toilet, bedpan, or urinal)?: A Little  How much help is needed for putting on and taking off regular upper body clothing?: A Little  How much help is needed for taking care of personal grooming?: A Little  How much help for eating meals?: None  AM-PAC Inpatient Daily Activity Raw Score: 19  AM-PAC Inpatient  ADL T-Scale Score : 40.22  ADL Inpatient CMS 0-100% Score: 42.8  ADL Inpatient CMS G-Code Modifier : CK    Restrictions/Precautions  Restrictions/Precautions  Restrictions/Precautions: General Precautions  Activity Level: Up as Tolerated  Required Braces or Orthoses?: No       Subjective  General  Family / Caregiver Present: No  Subjective  Subjective: Cleared for therapy. Pt received supine in bed, retired to recliner, agreeable to OT this AM. Sitter present. No reports of pain during session.          Objective  Orientation  Overall Orientation Status: Impaired  Orientation Level: Oriented to person;Disoriented to situation;Disoriented to place;Oriented to time  Cognition  Overall Cognitive Status: Exceptions  Following Commands: Follows one step commands with increased time;Follows one step commands with repetition;Inconsistently follows commands  Attention Span: Attends with cues to redirect  Memory: Decreased recall of recent events  Safety Judgement: Decreased awareness of need for assistance;Decreased awareness of need for safety  Problem Solving: Assistance required to generate solutions;Assistance required to identify errors made;Assistance required to correct errors made;Assistance required to implement solutions;Decreased awareness of errors  Insights: Decreased awareness of deficits  Initiation: Does not require cues  Sequencing: Requires cues for some  Cognition Comment: Pt with overall decrease in problem solving, safety awareness, and insight into deficits. Pt pleasantly confused, however is less confused than last session.    Activities of Daily Living  Grooming: Stand by assistance;Increased time to complete  Grooming Skilled Clinical Factors: Standing at sink, SBA d/t cues needed to stay on task at hand. Pt. able to read the labels on hyg products, twist caps on and off of correct hyg products this date. Brushing teeth/washing face/B hand washing.  UE Bathing: Stand by assistance;Setup  UE Bathing

## 2025-05-24 NOTE — PROGRESS NOTES
Result Value Ref Range    Sodium 139 136 - 145 mmol/L    Potassium 4.2 3.7 - 5.3 mmol/L    Chloride 106 98 - 107 mmol/L    CO2 22 20 - 31 mmol/L    Anion Gap 11 9 - 16 mmol/L    Glucose 95 74 - 99 mg/dL    BUN 16 6 - 20 mg/dL    Creatinine 0.7 0.6 - 0.9 mg/dL    Est, Glom Filt Rate >90 >60 mL/min/1.73m2    Calcium 8.9 8.6 - 10.4 mg/dL   CBC with Auto Differential    Collection Time: 05/24/25  4:02 AM   Result Value Ref Range    WBC 4.4 3.5 - 11.3 k/uL    RBC 3.26 (L) 3.95 - 5.11 m/uL    Hemoglobin 11.3 (L) 11.9 - 15.1 g/dL    Hematocrit 34.5 (L) 36.3 - 47.1 %    .8 (H) 82.6 - 102.9 fL    MCH 34.7 (H) 25.2 - 33.5 pg    MCHC 32.8 28.4 - 34.8 g/dL    RDW 13.7 11.8 - 14.4 %    Platelets 204 138 - 453 k/uL    MPV 9.2 8.1 - 13.5 fL    NRBC Automated 0.0 0.0 per 100 WBC    Neutrophils % 51 36 - 65 %    Lymphocytes % 31 24 - 43 %    Monocytes % 13 (H) 3 - 12 %    Eosinophils % 3 1 - 4 %    Basophils % 1 0 - 2 %    Immature Granulocytes % 1 (H) 0 %    Neutrophils Absolute 2.27 1.50 - 8.10 k/uL    Lymphocytes Absolute 1.35 1.10 - 3.70 k/uL    Monocytes Absolute 0.58 0.10 - 1.20 k/uL    Eosinophils Absolute 0.11 0.00 - 0.44 k/uL    Basophils Absolute <0.03 0.00 - 0.20 k/uL    Immature Granulocytes Absolute <0.03 0.00 - 0.30 k/uL    RBC Morphology MACROCYTOSIS PRESENT          Radiology:  MRI LIMITED BRAIN  Result Date: 5/16/2025  1. Limited brain MRI performed using the stroke protocol. 2. Cerebral atrophy. Chronic small vessel ischemic changes. 3. No areas of restricted diffusion to suggest an acute ischemic event. 4. High signal in the medial aspect of the temporal lobes bilaterally. This could be artifact or related to pathology. Herpes encephalitis should be excluded.     XR ABDOMEN (KUB) (SINGLE AP VIEW)  Result Date: 5/16/2025  Essure device within the pelvis.     XR CHEST PORTABLE  Result Date: 5/16/2025  1. No acute process.     CTA HEAD NECK W CONTRAST  Result Date: 5/16/2025  1. No flow limiting stenosis or  Hypokalemia, Hypomagnesemia  Trending up  Continue repletion as needed    Hypokalemia, hypomagnesemia  Improving, continue to replace as needed    PT/OT/SLP/Social work all consulted  Diet: ADULT DIET; Regular  DVT Prophylaxis: lovenox 40 mg SC daily  Discharge Planning:  on board, likely [facility], pre-CERT started 5/22    Patient to be discussed with attending physician, Dr. Gulshan Dominguez MD  Neurology Service  PGY-2 IM Resident  5/24/2025 7:46 AM      Copy sent to Rebecca De Paz MD    This note is created with the assistance of a speech-recognition program. While intending to generate a document that actually reflects the content of the visit, the document can still have some errors including those of syntax and sound a- like substitutions which may escape proofreading. In such instances, actual meaning can be extrapolated by contextual derivation.

## 2025-05-24 NOTE — PROGRESS NOTES
(!) 135/95 98 °F (36.7 °C) Oral 91 15 100 %   05/24/25 0433 (!) 149/94 -- -- -- -- --   05/24/25 0429 (!) 105/57 98.4 °F (36.9 °C) Oral 83 14 100 %   05/24/25 0013 (!) 146/96 97.8 °F (36.6 °C) Oral 89 16 100 %   5/21  Still confused- abd soft non tender- no rash- planned for another LP    5/22  Confused and very pleasant -LP again today only 2 cc fluid - sent for some limited studies    5/23  No fever and no chills - abd soft still confused - CSF 5/21 VDRL neg and CSF glucose normal - Prot even more elevated 412    CURRENT EVALUATION :5/24/2025     Afebrile  VS stable    Patient stable  No new issues reported  Continuing PT rehabilitation    Medications reviewed:  Monitor off antibiotics     Summary of relevant labs:5/24/2025   Labs:    BUN  16  Cr 0.6    WBC 4.4  Hb 11.3  Plat 204    Micro:  CSF PCR neg  BC  5/16 neg    Procedures      Cardiology      Imaging:  MRI brain 5/18  1. Patient motion limits evaluation.  2. The T2 FLAIR hyperintensity in the medial aspect of the temporal lobes  appears less conspicuous on the current exam.  3. Otherwise, no acute intracranial abnormality. No acute infarct.  4. Mild to moderate global parenchymal volume loss with mild chronic  microvascular ischemic changes.    MRI brain 5/16  . Limited brain MRI performed using the stroke protocol.  2. Cerebral atrophy. Chronic small vessel ischemic changes.  3. No areas of restricted diffusion to suggest an acute ischemic event.  4. High signal in the medial aspect of the temporal lobes bilaterally. This  could be artifact or related to pathology. Herpes encephalitis should be  excluded.          I have personally reviewed the past medical history, past surgical history, medications, social history, and family history, and I haveupdated the database accordingly.      Allergies:   Environmental/seasonal     Review of Systems:     Review of Systems   Constitutional:  Negative for activity change, diaphoresis and fatigue.   HENT:  Negative  Diabetes Mother     Heart Disease Mother     Stomach Cancer Maternal Grandmother     Cancer Maternal Grandmother     Diabetes Brother       Medical Decision Making:   I have independently reviewed/ordered the following labs:    CBC with Differential:   Recent Labs     05/23/25  0636 05/24/25  0402   WBC 3.3* 4.4   HGB 11.6* 11.3*   HCT 34.8* 34.5*    204   LYMPHOPCT 25 31   MONOPCT 9 13*   EOSPCT 2 3     BMP:  Recent Labs     05/23/25  0636 05/24/25  0402    139   K 3.5* 4.2    106   CO2 22 22   BUN 6 16   CREATININE 0.5* 0.7   MG 1.8  --      Hepatic Function Panel: No results for input(s): \"LABALBU\", \"BILIDIR\", \"IBILI\", \"BILITOT\", \"ALKPHOS\", \"ALT\", \"AST\" in the last 72 hours.    Invalid input(s): \"PROT\"  No results for input(s): \"RPR\" in the last 72 hours.  No results for input(s): \"HIV\" in the last 72 hours.  No results for input(s): \"BC\" in the last 72 hours.  Lab Results   Component Value Date/Time    CREATININE 0.7 05/24/2025 04:02 AM    GLUCOSE 95 05/24/2025 04:02 AM       Detailed results:        Thank you for allowing us to participate in the care of this patient.Please call with questions.    This note is created with the assistance of a speech recognition program.  While intending to generate adocument that actually reflects the content of the visit, the document can still have some errors including those of syntax and sound a like substitutions which may escape proof reading.  It such instances, actual meaningcan be extrapolated by contextual diversion.    Laz Nunez MD  Office: [phone]  Perfect serve / office [phone]

## 2025-05-25 LAB
ANION GAP SERPL CALCULATED.3IONS-SCNC: 13 MMOL/L (ref 9–16)
BASOPHILS # BLD: 0 K/UL (ref 0–0.2)
BASOPHILS NFR BLD: 0 % (ref 0–2)
BUN SERPL-MCNC: 15 MG/DL (ref 6–20)
CALCIUM SERPL-MCNC: 9.1 MG/DL (ref 8.6–10.4)
CHLORIDE SERPL-SCNC: 107 MMOL/L (ref 98–107)
CO2 SERPL-SCNC: 20 MMOL/L (ref 20–31)
CREAT SERPL-MCNC: 0.7 MG/DL (ref 0.6–0.9)
EOSINOPHIL # BLD: 0.14 K/UL (ref 0–0.4)
EOSINOPHILS RELATIVE PERCENT: 4 % (ref 1–4)
ERYTHROCYTE [DISTWIDTH] IN BLOOD BY AUTOMATED COUNT: 13.8 % (ref 11.8–14.4)
GFR, ESTIMATED: >90 ML/MIN/1.73M2
GLUCOSE SERPL-MCNC: 93 MG/DL (ref 74–99)
HCT VFR BLD AUTO: 36.3 % (ref 36.3–47.1)
HGB BLD-MCNC: 11.8 G/DL (ref 11.9–15.1)
IMM GRANULOCYTES # BLD AUTO: 0 K/UL (ref 0–0.3)
IMM GRANULOCYTES NFR BLD: 0 %
LYMPHOCYTES NFR BLD: 1.37 K/UL (ref 1–4.8)
LYMPHOCYTES RELATIVE PERCENT: 39 % (ref 24–44)
MCH RBC QN AUTO: 34.5 PG (ref 25.2–33.5)
MCHC RBC AUTO-ENTMCNC: 32.5 G/DL (ref 28.4–34.8)
MCV RBC AUTO: 106.1 FL (ref 82.6–102.9)
MONOCYTES NFR BLD: 0.21 K/UL (ref 0.1–0.8)
MONOCYTES NFR BLD: 6 % (ref 1–7)
MORPHOLOGY: ABNORMAL
NEUTROPHILS NFR BLD: 51 % (ref 36–66)
NEUTS SEG NFR BLD: 1.78 K/UL (ref 1.8–7.7)
NRBC BLD-RTO: 0 PER 100 WBC
PLATELET # BLD AUTO: 214 K/UL (ref 138–453)
PMV BLD AUTO: 9 FL (ref 8.1–13.5)
POTASSIUM SERPL-SCNC: 4.6 MMOL/L (ref 3.7–5.3)
RBC # BLD AUTO: 3.42 M/UL (ref 3.95–5.11)
SODIUM SERPL-SCNC: 140 MMOL/L (ref 136–145)
WBC OTHER # BLD: 3.5 K/UL (ref 3.5–11.3)

## 2025-05-25 PROCEDURE — 6370000000 HC RX 637 (ALT 250 FOR IP)

## 2025-05-25 PROCEDURE — 99232 SBSQ HOSP IP/OBS MODERATE 35: CPT | Performed by: INTERNAL MEDICINE

## 2025-05-25 PROCEDURE — 2060000000 HC ICU INTERMEDIATE R&B

## 2025-05-25 PROCEDURE — 36415 COLL VENOUS BLD VENIPUNCTURE: CPT

## 2025-05-25 PROCEDURE — 99232 SBSQ HOSP IP/OBS MODERATE 35: CPT | Performed by: PSYCHIATRY & NEUROLOGY

## 2025-05-25 PROCEDURE — 2500000003 HC RX 250 WO HCPCS

## 2025-05-25 PROCEDURE — 6360000002 HC RX W HCPCS

## 2025-05-25 PROCEDURE — 80048 BASIC METABOLIC PNL TOTAL CA: CPT

## 2025-05-25 PROCEDURE — 85025 COMPLETE CBC W/AUTO DIFF WBC: CPT

## 2025-05-25 RX ADMIN — ENOXAPARIN SODIUM 40 MG: 100 INJECTION SUBCUTANEOUS at 09:54

## 2025-05-25 RX ADMIN — MULTIVITAMIN TABLET 1 TABLET: TABLET at 09:54

## 2025-05-25 RX ADMIN — Medication 100 MG: at 09:54

## 2025-05-25 RX ADMIN — QUETIAPINE FUMARATE 25 MG: 25 TABLET ORAL at 22:26

## 2025-05-25 RX ADMIN — SODIUM CHLORIDE, PRESERVATIVE FREE 10 ML: 5 INJECTION INTRAVENOUS at 22:26

## 2025-05-25 RX ADMIN — IMMUNE GLOBULIN (HUMAN) 20000 MG: 10 INJECTION INTRAVENOUS; SUBCUTANEOUS at 11:22

## 2025-05-25 RX ADMIN — SODIUM CHLORIDE, PRESERVATIVE FREE 10 ML: 5 INJECTION INTRAVENOUS at 09:55

## 2025-05-25 RX ADMIN — FOLIC ACID 1 MG: 1 TABLET ORAL at 09:54

## 2025-05-25 NOTE — PLAN OF CARE
Problem: Chronic Conditions and Co-morbidities  Goal: Patient's chronic conditions and co-morbidity symptoms are monitored and maintained or improved  5/25/2025 0147 by Alicia Chaudhari RN  Outcome: Progressing  5/24/2025 1657 by Milton Chun RN  Outcome: Progressing  Flowsheets (Taken 5/24/2025 0800)  Care Plan - Patient's Chronic Conditions and Co-Morbidity Symptoms are Monitored and Maintained or Improved: Monitor and assess patient's chronic conditions and comorbid symptoms for stability, deterioration, or improvement     Problem: Discharge Planning  Goal: Discharge to home or other facility with appropriate resources  5/25/2025 0147 by Alicia Chaudhari, RN  Outcome: Progressing  5/24/2025 1657 by Milton Chun RN  Outcome: Progressing  Flowsheets (Taken 5/24/2025 0800)  Discharge to home or other facility with appropriate resources: Identify barriers to discharge with patient and caregiver     Problem: Pain  Goal: Verbalizes/displays adequate comfort level or baseline comfort level  5/25/2025 0147 by Alicia Chaudhari, RN  Outcome: Progressing  5/24/2025 1657 by Milton Chun RN  Outcome: Progressing  Flowsheets  Taken 5/24/2025 1550  Verbalizes/displays adequate comfort level or baseline comfort level: Encourage patient to monitor pain and request assistance  Taken 5/24/2025 1253  Verbalizes/displays adequate comfort level or baseline comfort level: Encourage patient to monitor pain and request assistance  Taken 5/24/2025 0729  Verbalizes/displays adequate comfort level or baseline comfort level: Encourage patient to monitor pain and request assistance     Problem: Safety - Adult  Goal: Free from fall injury  5/25/2025 0147 by Alicia Chaudhari, RN  Outcome: Progressing  5/24/2025 1657 by Milton Chun, RN  Outcome: Progressing  Flowsheets  Taken 5/24/2025 0800 by Milton Chun, RN  Free From Fall Injury: Instruct family/caregiver on patient safety  Taken 5/24/2025 0433 by  Alicia Chaudhari RN  Free From Fall Injury: Instruct family/caregiver on patient safety     Problem: ABCDS Injury Assessment  Goal: Absence of physical injury  5/25/2025 0147 by Alicia Chaudhari RN  Outcome: Progressing  5/24/2025 1657 by Milton Chun RN  Outcome: Progressing  Flowsheets  Taken 5/24/2025 0800 by Milton Chun RN  Absence of Physical Injury: Implement safety measures based on patient assessment  Taken 5/24/2025 0433 by Alicia Chaudhari RN  Absence of Physical Injury: Implement safety measures based on patient assessment     Problem: Neurosensory - Adult  Goal: Achieves stable or improved neurological status  5/25/2025 0147 by Alicia Chaudhari RN  Outcome: Progressing  5/24/2025 1657 by Milton Chun RN  Outcome: Progressing  Flowsheets (Taken 5/24/2025 0800)  Achieves stable or improved neurological status: Assess for and report changes in neurological status  Goal: Absence of seizures  5/25/2025 0147 by Alicia Chaudhari RN  Outcome: Progressing  5/24/2025 1657 by Milton Chun RN  Outcome: Progressing  Flowsheets (Taken 5/24/2025 0800)  Absence of seizures: Monitor for seizure activity.  If seizure occurs, document type and location of movements and any associated apnea  Goal: Remains free of injury related to seizures activity  5/25/2025 0147 by Alicia Chaudhari RN  Outcome: Progressing  5/24/2025 1657 by Milton Chun RN  Outcome: Progressing  Flowsheets (Taken 5/24/2025 0800)  Remains free of injury related to seizure activity: Maintain airway, patient safety  and administer oxygen as ordered  Goal: Achieves maximal functionality and self care  5/25/2025 0147 by Alicia Chaudhari RN  Outcome: Progressing  5/24/2025 1657 by Milton Chun RN  Outcome: Progressing  Flowsheets (Taken 5/24/2025 0800)  Achieves maximal functionality and self care: Monitor swallowing and airway patency with patient fatigue and changes in neurological status

## 2025-05-25 NOTE — CARE COORDINATION
Case Management   Daily Progress Note       Patient Name: Purvi Ray                   YOB: 1970  Diagnosis: Hypokalemia [E87.6]  Hypomagnesemia [E83.42]  Altered mental status [R41.82]  Altered mental status, unspecified altered mental status type [R41.82]                       GMLOS: 3.3 days  Length of Stay: 9  days    Anticipated Discharge Date: Two or more days before discharge    Readmission Risk (Low < 19, Mod (19-27), High > 27): Readmission Risk Score: 7.5        Current Transitional Plan    [] Home Independently    [] Home with HC    [x] Skilled Nursing Facility    [] Acute Rehabilitation    [] Long Term Acute Care (LTAC)    [] Other:     Plan for the Stay (Medical Management) :    Plan will be Home with Home Care- list sent to Daughter, need Choices; SNF- denied, request potential long term- will need medicaid application    Sitter at Bedside    Workflow Continuation (Additional Notes) :    1004: CM spoke with Naz hayden to update on denial to SNF. CM reports that Home Health care is an option for nursing and therapy in the home, however, it is only a few times a week for maybe an hour each visit. CM also explained that Private Home Care Aides can be paid for out of pocket to have a caregiver stay with patient, however, the duration and cost would be determined with the provider directly. CM to text resources to daughter.    Daughter is concerned for patient to return home due to mentation. Daughter is requesting information on Long Term placement. CM to work with team if that is something that can be facilitated while in the hospital. CM to update Naz once CM speaks to team. No further questions or needs at his time     1033: CM sent Home Care list skilled and unskilled to Naz hayden via text.     1039: CM called and left voicemail for Linda agent for Public Benefits to find out what is needed to initiate Medicaid Application, awaiting return call.     Jackie ZAPIEN   Mario  May 25, 2025

## 2025-05-25 NOTE — PROGRESS NOTES
[facility] Neurology   IN-PATIENT SERVICE  General Neurology Progress Note   [facility]                Date:   5/25/2025  Patient name:  Purvi Ray  Date of admission:  5/16/2025 10:13 AM  MRN:   [id number]  Account:  [id number]  YOB: 1970  PCP:    Rebecca Leong MD  Room:   [address] [place] [address]  Code Status:    Full Code  Chief Complaint:   Chief Complaint   Patient presents with    Altered Mental Status       Interval history      Exam unchanged.    IVIG day 4, final dose.    Denied SNF, working on home with home health    Brief History     54-year-old woman with history of hypertension, diabetes mellitus, HSV, and alcohol use with alcohol-related seizures was found down on the street next to a bottle of vodka. Details surrounding the event are unclear. EMS was called for confusion; last known well is unknown. On initial evaluation, patient was alert to name only, unable to state the date, month, or year. She was unable to perform simple calculations or repeat phrases. Cranial nerves were grossly intact, strength was symmetrical. Speech was fluent but comprehension was impaired. No clear dysarthria. She held upper extremities antigravity and withdrew to pain in the lower extremities. NIHSS was 8. BP in the 150s, glucose 128. CT head showed no acute abnormalities.    Differential at presentation included Wernicke’s encephalopathy, alcohol-related delirium, postictal confusion, HSV encephalitis, and toxic-metabolic encephalopathy. Initial labs notable for hypokalemia (2.4), hypomagnesemia (1.5), hypocalcemia (8.2), hyponatremia, metabolic acidosis. Ammonia was 45, TSH 1.53. Toxicology screen was negative.    Following CT, her mental status improved--she was able to state her age, follow simple commands, and move all extremities without clear focal deficits.    MRI brain w/wo contrast showed high signal in the bilateral medial temporal lobes concerning for artifact vs HSV  140 136 - 145 mmol/L    Potassium 4.6 3.7 - 5.3 mmol/L    Chloride 107 98 - 107 mmol/L    CO2 20 20 - 31 mmol/L    Anion Gap 13 9 - 16 mmol/L    Glucose 93 74 - 99 mg/dL    BUN 15 6 - 20 mg/dL    Creatinine 0.7 0.6 - 0.9 mg/dL    Est, Glom Filt Rate >90 >60 mL/min/1.73m2    Calcium 9.1 8.6 - 10.4 mg/dL   CBC with Auto Differential    Collection Time: 05/25/25  7:17 AM   Result Value Ref Range    WBC 3.5 3.5 - 11.3 k/uL    RBC 3.42 (L) 3.95 - 5.11 m/uL    Hemoglobin 11.8 (L) 11.9 - 15.1 g/dL    Hematocrit 36.3 36.3 - 47.1 %    .1 (H) 82.6 - 102.9 fL    MCH 34.5 (H) 25.2 - 33.5 pg    MCHC 32.5 28.4 - 34.8 g/dL    RDW 13.8 11.8 - 14.4 %    Platelets 214 138 - 453 k/uL    MPV 9.0 8.1 - 13.5 fL    NRBC Automated 0.0 0.0 per 100 WBC    Immature Granulocytes % 0 0 %    Neutrophils % 51 36 - 66 %    Lymphocytes % 39 24 - 44 %    Monocytes % 6 1 - 7 %    Eosinophils % 4 1 - 4 %    Basophils % 0 0 - 2 %    Immature Granulocytes Absolute 0.00 0.00 - 0.30 k/uL    Neutrophils Absolute 1.78 (L) 1.8 - 7.7 k/uL    Lymphocytes Absolute 1.37 1.0 - 4.8 k/uL    Monocytes Absolute 0.21 0.1 - 0.8 k/uL    Eosinophils Absolute 0.14 0.0 - 0.4 k/uL    Basophils Absolute 0.00 0.0 - 0.2 k/uL    Morphology MACROCYTOSIS PRESENT          Radiology:  MRI LIMITED BRAIN  Result Date: 5/16/2025  1. Limited brain MRI performed using the stroke protocol. 2. Cerebral atrophy. Chronic small vessel ischemic changes. 3. No areas of restricted diffusion to suggest an acute ischemic event. 4. High signal in the medial aspect of the temporal lobes bilaterally. This could be artifact or related to pathology. Herpes encephalitis should be excluded.     XR ABDOMEN (KUB) (SINGLE AP VIEW)  Result Date: 5/16/2025  Essure device within the pelvis.     XR CHEST PORTABLE  Result Date: 5/16/2025  1. No acute process.     CTA HEAD NECK W CONTRAST  Result Date: 5/16/2025  1. No flow limiting stenosis or dissection of the cervical carotid/vertebral arteries. 2. No

## 2025-05-25 NOTE — PROGRESS NOTES
[facility] -   Infectious diseases evaluation  admission date 5/16/2025    reason for consultation:   Possible meningitis    Impression :   Current:  Found down - acute encephalopathy 5/16 possible meningitis  5/19 LP difficult twice - 2 cc bloody fluid removed - CSF PCR neg  Wakes up confused  MRI initially suspicious for bitemporal high signal - repeat MRI T2 flair less suspicious for infection -  Seizure - possibly post ictal  ETOH abuse concern for Wernicke      Other:  DM2 - asthma-  Discussion / summary of stay / plan of care/ Recommendations:   CSF PCR neg few days after admission - hence stop acyclovir since HSv is less likely  Keep ceftriaxone  5/17 - till 5/23 - 7 days - completed  5/21 LP under anesthesia today, 2 CC fluid  only - limited studies  Recommendations:   Monitor off antibiotics   Completed ceftriaxone on 5-23-25    Infection Control Recommendations   [place] Precautions    Antimicrobial Stewardship Recommendations   Simplification of therapy  Targeted therapy  History of Present Illness:   Initial history:  Purvi Ray is a 54 y.o.-year-old female   Found down w a bottle on the street not responding - acute encephalopathy 5/16 - no fever - and WBc low normal- metabolic acidosis noticed and electrolyte abnormalities corrected -  CXR and CTAP chest are all negative  5/19 LP difficult twice - 2 cc bloody fluid removed - CSF PCR neg  Wakes up confused  MRI initially suspicious for bitemporal high signal - repeat MRI T2 flair less suspicious for infection -  Seizure - possibly post ictal  Etoh abuse concern for Wernicke  Metabolic acidosis as well and hypokalemia    Started on acyclovir and ceftriaxone CNS dosing since 5/17  Still confused -  ID called for AB management          Interval changes  5/25/2025   Patient Vitals for the past 8 hrs:   BP Temp Temp src Pulse Resp SpO2   05/25/25 1100 (!) 144/87 98.6 °F (37 °C) Oral 87 18 99 %    05/25/25 0818 117/85 97.5 °F (36.4 °C) -- 68 18 99 %   5/21  Still confused- abd soft non tender- no rash- planned for another LP    5/22  Confused and very pleasant -LP again today only 2 cc fluid - sent for some limited studies    5/23  No fever and no chills - abd soft still confused - CSF 5/21 VDRL neg and CSF glucose normal - Prot even more elevated 412    CURRENT EVALUATION :5/25/2025     Afebrile  VS stable    Patient stable  No complaints  No new issues reported    Medications reviewed:  Monitor off antibiotics     Continuing PT rehabilitation        Summary of relevant labs:5/25/2025   Labs:    BUN  16--.15  Cr 0.6-->0.7    WBC 4.4-->3.5  Hb 11.3-->11.8  Plat 204-->214    Micro:  CSF PCR neg  BC  5/16 neg    Procedures      Cardiology      Imaging:  MRI brain 5/18  1. Patient motion limits evaluation.  2. The T2 FLAIR hyperintensity in the medial aspect of the temporal lobes  appears less conspicuous on the current exam.  3. Otherwise, no acute intracranial abnormality. No acute infarct.  4. Mild to moderate global parenchymal volume loss with mild chronic  microvascular ischemic changes.    MRI brain 5/16  . Limited brain MRI performed using the stroke protocol.  2. Cerebral atrophy. Chronic small vessel ischemic changes.  3. No areas of restricted diffusion to suggest an acute ischemic event.  4. High signal in the medial aspect of the temporal lobes bilaterally. This  could be artifact or related to pathology. Herpes encephalitis should be  excluded.          I have personally reviewed the past medical history, past surgical history, medications, social history, and family history, and I haveupdated the database accordingly.      Allergies:   Environmental/seasonal     Review of Systems:     Review of Systems   Constitutional:  Negative for activity change, diaphoresis and fatigue.   HENT:  Negative for congestion.    Eyes:  Negative for pain, discharge and redness.   Respiratory:  Negative for apnea.

## 2025-05-26 LAB
ANION GAP SERPL CALCULATED.3IONS-SCNC: 11 MMOL/L (ref 9–16)
BASOPHILS # BLD: 0.04 K/UL (ref 0–0.2)
BASOPHILS NFR BLD: 1 % (ref 0–2)
BUN SERPL-MCNC: 13 MG/DL (ref 6–20)
CALCIUM SERPL-MCNC: 9.4 MG/DL (ref 8.6–10.4)
CHLORIDE SERPL-SCNC: 105 MMOL/L (ref 98–107)
CO2 SERPL-SCNC: 22 MMOL/L (ref 20–31)
CREAT SERPL-MCNC: 0.7 MG/DL (ref 0.6–0.9)
EOSINOPHIL # BLD: 0.09 K/UL (ref 0–0.44)
EOSINOPHILS RELATIVE PERCENT: 3 % (ref 1–4)
ERYTHROCYTE [DISTWIDTH] IN BLOOD BY AUTOMATED COUNT: 13.5 % (ref 11.8–14.4)
GFR, ESTIMATED: >90 ML/MIN/1.73M2
GLUCOSE BLD-MCNC: 85 MG/DL (ref 65–105)
GLUCOSE SERPL-MCNC: 86 MG/DL (ref 74–99)
HCT VFR BLD AUTO: 37.8 % (ref 36.3–47.1)
HGB BLD-MCNC: 12.2 G/DL (ref 11.9–15.1)
IMM GRANULOCYTES # BLD AUTO: 0.04 K/UL (ref 0–0.3)
IMM GRANULOCYTES NFR BLD: 1 %
LYMPHOCYTES NFR BLD: 1.23 K/UL (ref 1.1–3.7)
LYMPHOCYTES RELATIVE PERCENT: 34 % (ref 24–43)
MCH RBC QN AUTO: 34.3 PG (ref 25.2–33.5)
MCHC RBC AUTO-ENTMCNC: 32.3 G/DL (ref 28.4–34.8)
MCV RBC AUTO: 106.2 FL (ref 82.6–102.9)
MONOCYTES NFR BLD: 0.42 K/UL (ref 0.1–1.2)
MONOCYTES NFR BLD: 12 % (ref 3–12)
NEUTROPHILS NFR BLD: 49 % (ref 36–65)
NEUTS SEG NFR BLD: 1.84 K/UL (ref 1.5–8.1)
NRBC BLD-RTO: 0 PER 100 WBC
PLATELET # BLD AUTO: 229 K/UL (ref 138–453)
PMV BLD AUTO: 9.1 FL (ref 8.1–13.5)
POTASSIUM SERPL-SCNC: 4.1 MMOL/L (ref 3.7–5.3)
RBC # BLD AUTO: 3.56 M/UL (ref 3.95–5.11)
RBC # BLD: ABNORMAL 10*6/UL
SODIUM SERPL-SCNC: 138 MMOL/L (ref 136–145)
WBC OTHER # BLD: 3.7 K/UL (ref 3.5–11.3)

## 2025-05-26 PROCEDURE — 6370000000 HC RX 637 (ALT 250 FOR IP)

## 2025-05-26 PROCEDURE — 2060000000 HC ICU INTERMEDIATE R&B

## 2025-05-26 PROCEDURE — 6360000002 HC RX W HCPCS

## 2025-05-26 PROCEDURE — 99232 SBSQ HOSP IP/OBS MODERATE 35: CPT | Performed by: PSYCHIATRY & NEUROLOGY

## 2025-05-26 PROCEDURE — 85025 COMPLETE CBC W/AUTO DIFF WBC: CPT

## 2025-05-26 PROCEDURE — 2500000003 HC RX 250 WO HCPCS

## 2025-05-26 PROCEDURE — 99232 SBSQ HOSP IP/OBS MODERATE 35: CPT | Performed by: INTERNAL MEDICINE

## 2025-05-26 PROCEDURE — 80048 BASIC METABOLIC PNL TOTAL CA: CPT

## 2025-05-26 PROCEDURE — 82947 ASSAY GLUCOSE BLOOD QUANT: CPT

## 2025-05-26 PROCEDURE — 36415 COLL VENOUS BLD VENIPUNCTURE: CPT

## 2025-05-26 RX ADMIN — FOLIC ACID 1 MG: 1 TABLET ORAL at 09:37

## 2025-05-26 RX ADMIN — ENOXAPARIN SODIUM 40 MG: 100 INJECTION SUBCUTANEOUS at 09:37

## 2025-05-26 RX ADMIN — QUETIAPINE FUMARATE 25 MG: 25 TABLET ORAL at 20:39

## 2025-05-26 RX ADMIN — SODIUM CHLORIDE, PRESERVATIVE FREE 10 ML: 5 INJECTION INTRAVENOUS at 20:39

## 2025-05-26 RX ADMIN — SODIUM CHLORIDE, PRESERVATIVE FREE 10 ML: 5 INJECTION INTRAVENOUS at 09:38

## 2025-05-26 RX ADMIN — Medication 100 MG: at 09:37

## 2025-05-26 RX ADMIN — MULTIVITAMIN TABLET 1 TABLET: TABLET at 09:37

## 2025-05-26 RX ADMIN — IMMUNE GLOBULIN (HUMAN) 20000 MG: 10 INJECTION INTRAVENOUS; SUBCUTANEOUS at 14:13

## 2025-05-26 ASSESSMENT — ENCOUNTER SYMPTOMS
COLOR CHANGE: 0
APNEA: 0
NAUSEA: 0
DIARRHEA: 0
SINUS PAIN: 0
EYE REDNESS: 0
SORE THROAT: 0
EYE PAIN: 0
SHORTNESS OF BREATH: 0
VOMITING: 0
EYE DISCHARGE: 0
SINUS PRESSURE: 0
ABDOMINAL PAIN: 0

## 2025-05-26 NOTE — PROGRESS NOTES
[facility] -   Infectious diseases evaluation  admission date 5/16/2025    reason for consultation:   Possible meningitis    Impression :   Current:  Found down - acute encephalopathy 5/16 possible meningitis  5/19 LP difficult twice - 2 cc bloody fluid removed - CSF PCR neg  Wakes up confused  MRI initially suspicious for bitemporal high signal - repeat MRI T2 flair less suspicious for infection -  Seizure - possibly post ictal  Etoh abuse concern for Wernicke      Other:  DM2 - asthma-  Discussion / summary of stay / plan of care/ Recommendations:     HENCE:   CSF PCR neg few days after admission - hence stop acyclovir since HSv is less likely  Keep ceftriaxone  5/17 - till 5/23 - 7 days - completed  5/21 LP under anesthesia today, 2 CC fluid  only - limited studies  5/26 keep off AB -confusion slowly better  ID will follow from afar    Infection Control Recommendations   [place] Precautions    Antimicrobial Stewardship Recommendations   Simplification of therapy  Targeted therapy  History of Present Illness:   Initial history:  Purvi Ray is a 54 y.o.-year-old female   Found down w a bottle on the street not responding - acute encephalopathy 5/16 - no fever - and WBc low normal- metabolic acidosis noticed and electrolyte abnormalities corrected -  CXR and CTAP chest are all negative  5/19 LP difficult twice - 2 cc bloody fluid removed - CSF PCR neg  Wakes up confused  MRI initially suspicious for bitemporal high signal - repeat MRI T2 flair less suspicious for infection -  Seizure - possibly post ictal  Etoh abuse concern for Wernicke  Metabolic acidosis as well and hypokalemia    Started on acyclovir and ceftriaxone CNS dosing since 5/17  Still confused -  ID called for AB management          Interval changes  5/26/2025   Patient Vitals for the past 8 hrs:   BP Temp Temp src Pulse Resp SpO2   05/26/25 0826 126/76 -- -- 81 15 99 %   05/26/25 0439 112/76

## 2025-05-26 NOTE — PLAN OF CARE
Problem: Chronic Conditions and Co-morbidities  Goal: Patient's chronic conditions and co-morbidity symptoms are monitored and maintained or improved  5/26/2025 1116 by Danuta Cho RN  Outcome: Progressing  5/26/2025 0259 by Linnette Back RN  Outcome: Progressing  Flowsheets (Taken 5/25/2025 2000)  Care Plan - Patient's Chronic Conditions and Co-Morbidity Symptoms are Monitored and Maintained or Improved:   Monitor and assess patient's chronic conditions and comorbid symptoms for stability, deterioration, or improvement   Collaborate with multidisciplinary team to address chronic and comorbid conditions and prevent exacerbation or deterioration   Update acute care plan with appropriate goals if chronic or comorbid symptoms are exacerbated and prevent overall improvement and discharge     Problem: Discharge Planning  Goal: Discharge to home or other facility with appropriate resources  5/26/2025 1116 by Danuta Cho RN  Outcome: Progressing  5/26/2025 0259 by Linnette Back RN  Outcome: Progressing  Flowsheets (Taken 5/25/2025 2000)  Discharge to home or other facility with appropriate resources:   Identify barriers to discharge with patient and caregiver   Identify discharge learning needs (meds, wound care, etc)   Arrange for needed discharge resources and transportation as appropriate   Arrange for interpreters to assist at discharge as needed   Refer to discharge planning if patient needs post-hospital services based on physician order or complex needs related to functional status, cognitive ability or social support system

## 2025-05-26 NOTE — PROGRESS NOTES
[facility] Neurology   IN-PATIENT SERVICE  General Neurology Progress Note   [facility]                Date:   5/26/2025  Patient name:  Purvi Ray  Date of admission:  5/16/2025 10:13 AM  MRN:   [id number]  Account:  [id number]  YOB: 1970  PCP:    Rebecca Leong MD  Room:   [address] [place] [address]  Code Status:    Full Code  Chief Complaint:   Chief Complaint   Patient presents with    Altered Mental Status       Interval history      Exam unchanged.    S/p IVIG x4    Denied SNF, working on home with home health vs long-term placement     Brief History     54-year-old woman with history of hypertension, diabetes mellitus, HSV, and alcohol use with alcohol-related seizures was found down on the street next to a bottle of vodka. Details surrounding the event are unclear. EMS was called for confusion; last known well is unknown. On initial evaluation, patient was alert to name only, unable to state the date, month, or year. She was unable to perform simple calculations or repeat phrases. Cranial nerves were grossly intact, strength was symmetrical. Speech was fluent but comprehension was impaired. No clear dysarthria. She held upper extremities antigravity and withdrew to pain in the lower extremities. NIHSS was 8. BP in the 150s, glucose 128. CT head showed no acute abnormalities.    Differential at presentation included Wernicke’s encephalopathy, alcohol-related delirium, postictal confusion, HSV encephalitis, and toxic-metabolic encephalopathy. Initial labs notable for hypokalemia (2.4), hypomagnesemia (1.5), hypocalcemia (8.2), hyponatremia, metabolic acidosis. Ammonia was 45, TSH 1.53. Toxicology screen was negative.    Following CT, her mental status improved--she was able to state her age, follow simple commands, and move all extremities without clear focal deficits.    MRI brain w/wo contrast showed high signal in the bilateral medial temporal lobes concerning for artifact

## 2025-05-26 NOTE — PLAN OF CARE
Problem: Chronic Conditions and Co-morbidities  Goal: Patient's chronic conditions and co-morbidity symptoms are monitored and maintained or improved  Outcome: Progressing  Flowsheets (Taken 5/25/2025 2000)  Care Plan - Patient's Chronic Conditions and Co-Morbidity Symptoms are Monitored and Maintained or Improved:   Monitor and assess patient's chronic conditions and comorbid symptoms for stability, deterioration, or improvement   Collaborate with multidisciplinary team to address chronic and comorbid conditions and prevent exacerbation or deterioration   Update acute care plan with appropriate goals if chronic or comorbid symptoms are exacerbated and prevent overall improvement and discharge     Problem: Discharge Planning  Goal: Discharge to home or other facility with appropriate resources  Outcome: Progressing  Flowsheets (Taken 5/25/2025 2000)  Discharge to home or other facility with appropriate resources:   Identify barriers to discharge with patient and caregiver   Identify discharge learning needs (meds, wound care, etc)   Arrange for needed discharge resources and transportation as appropriate   Arrange for interpreters to assist at discharge as needed   Refer to discharge planning if patient needs post-hospital services based on physician order or complex needs related to functional status, cognitive ability or social support system     Problem: Pain  Goal: Verbalizes/displays adequate comfort level or baseline comfort level  Outcome: Progressing  Flowsheets (Taken 5/25/2025 1943)  Verbalizes/displays adequate comfort level or baseline comfort level:   Encourage patient to monitor pain and request assistance   Assess pain using appropriate pain scale   Administer analgesics based on type and severity of pain and evaluate response   Implement non-pharmacological measures as appropriate and evaluate response   Consider cultural and social influences on pain and pain management   Notify Licensed

## 2025-05-27 LAB
ANION GAP SERPL CALCULATED.3IONS-SCNC: 11 MMOL/L (ref 9–16)
BASOPHILS # BLD: 0.03 K/UL (ref 0–0.2)
BASOPHILS NFR BLD: 1 % (ref 0–2)
BUN SERPL-MCNC: 22 MG/DL (ref 6–20)
CALCIUM SERPL-MCNC: 9.1 MG/DL (ref 8.6–10.4)
CHLORIDE SERPL-SCNC: 105 MMOL/L (ref 98–107)
CO2 SERPL-SCNC: 19 MMOL/L (ref 20–31)
CREAT SERPL-MCNC: 0.8 MG/DL (ref 0.6–0.9)
EOSINOPHIL # BLD: 0.12 K/UL (ref 0–0.44)
EOSINOPHILS RELATIVE PERCENT: 3 % (ref 1–4)
ERYTHROCYTE [DISTWIDTH] IN BLOOD BY AUTOMATED COUNT: 13.5 % (ref 11.8–14.4)
GFR, ESTIMATED: 86 ML/MIN/1.73M2
GLUCOSE SERPL-MCNC: 84 MG/DL (ref 74–99)
HCT VFR BLD AUTO: 35.4 % (ref 36.3–47.1)
HGB BLD-MCNC: 11.7 G/DL (ref 11.9–15.1)
IMM GRANULOCYTES # BLD AUTO: 0.05 K/UL (ref 0–0.3)
IMM GRANULOCYTES NFR BLD: 1 %
LYMPHOCYTES NFR BLD: 1.5 K/UL (ref 1.1–3.7)
LYMPHOCYTES RELATIVE PERCENT: 33 % (ref 24–43)
MCH RBC QN AUTO: 35 PG (ref 25.2–33.5)
MCHC RBC AUTO-ENTMCNC: 33.1 G/DL (ref 28.4–34.8)
MCV RBC AUTO: 106 FL (ref 82.6–102.9)
MONOCYTES NFR BLD: 0.5 K/UL (ref 0.1–1.2)
MONOCYTES NFR BLD: 11 % (ref 3–12)
NEUTROPHILS NFR BLD: 51 % (ref 36–65)
NEUTS SEG NFR BLD: 2.33 K/UL (ref 1.5–8.1)
NRBC BLD-RTO: 0 PER 100 WBC
PLATELET # BLD AUTO: 242 K/UL (ref 138–453)
PMV BLD AUTO: 9.4 FL (ref 8.1–13.5)
POTASSIUM SERPL-SCNC: 4 MMOL/L (ref 3.7–5.3)
RBC # BLD AUTO: 3.34 M/UL (ref 3.95–5.11)
RBC # BLD: ABNORMAL 10*6/UL
SODIUM SERPL-SCNC: 135 MMOL/L (ref 136–145)
WBC OTHER # BLD: 4.5 K/UL (ref 3.5–11.3)

## 2025-05-27 PROCEDURE — 6370000000 HC RX 637 (ALT 250 FOR IP)

## 2025-05-27 PROCEDURE — 36415 COLL VENOUS BLD VENIPUNCTURE: CPT

## 2025-05-27 PROCEDURE — 6360000002 HC RX W HCPCS

## 2025-05-27 PROCEDURE — 85025 COMPLETE CBC W/AUTO DIFF WBC: CPT

## 2025-05-27 PROCEDURE — 97530 THERAPEUTIC ACTIVITIES: CPT

## 2025-05-27 PROCEDURE — 92523 SPEECH SOUND LANG COMPREHEN: CPT

## 2025-05-27 PROCEDURE — 2500000003 HC RX 250 WO HCPCS

## 2025-05-27 PROCEDURE — 80048 BASIC METABOLIC PNL TOTAL CA: CPT

## 2025-05-27 PROCEDURE — 2060000000 HC ICU INTERMEDIATE R&B

## 2025-05-27 RX ORDER — LANOLIN ALCOHOL/MO/W.PET/CERES
100 CREAM (GRAM) TOPICAL DAILY
Qty: 30 TABLET | Refills: 3 | Status: SHIPPED | OUTPATIENT
Start: 2025-05-28

## 2025-05-27 RX ORDER — QUETIAPINE FUMARATE 25 MG/1
25 TABLET, FILM COATED ORAL NIGHTLY
Qty: 60 TABLET | Refills: 3 | Status: SHIPPED | OUTPATIENT
Start: 2025-05-27

## 2025-05-27 RX ORDER — MULTIVITAMIN WITH IRON
1 TABLET ORAL DAILY
Qty: 30 TABLET | Refills: 3 | Status: SHIPPED | OUTPATIENT
Start: 2025-05-28

## 2025-05-27 RX ADMIN — ENOXAPARIN SODIUM 40 MG: 100 INJECTION SUBCUTANEOUS at 07:59

## 2025-05-27 RX ADMIN — QUETIAPINE FUMARATE 25 MG: 25 TABLET ORAL at 20:39

## 2025-05-27 RX ADMIN — MULTIVITAMIN TABLET 1 TABLET: TABLET at 07:59

## 2025-05-27 RX ADMIN — SODIUM CHLORIDE, PRESERVATIVE FREE 10 ML: 5 INJECTION INTRAVENOUS at 20:39

## 2025-05-27 RX ADMIN — Medication 100 MG: at 07:59

## 2025-05-27 RX ADMIN — SODIUM CHLORIDE, PRESERVATIVE FREE 10 ML: 5 INJECTION INTRAVENOUS at 07:59

## 2025-05-27 RX ADMIN — FOLIC ACID 1 MG: 1 TABLET ORAL at 07:59

## 2025-05-27 ASSESSMENT — ENCOUNTER SYMPTOMS
VOMITING: 0
SORE THROAT: 0
DIARRHEA: 0
SHORTNESS OF BREATH: 0
NAUSEA: 0
SINUS PRESSURE: 0
SINUS PAIN: 0
ABDOMINAL PAIN: 0

## 2025-05-27 NOTE — PLAN OF CARE
Problem: Chronic Conditions and Co-morbidities  Goal: Patient's chronic conditions and co-morbidity symptoms are monitored and maintained or improved  Outcome: Progressing     Problem: Discharge Planning  Goal: Discharge to home or other facility with appropriate resources  Outcome: Progressing     Problem: Pain  Goal: Verbalizes/displays adequate comfort level or baseline comfort level  Outcome: Progressing     Problem: Safety - Adult  Goal: Free from fall injury  Outcome: Progressing     Problem: ABCDS Injury Assessment  Goal: Absence of physical injury  Outcome: Progressing     Problem: Skin/Tissue Integrity - Adult  Goal: Skin integrity remains intact  Outcome: Progressing

## 2025-05-27 NOTE — CARE COORDINATION
Case Management   Daily Progress Note       Patient Name: Purvi Ray                   YOB: 1970  Diagnosis: Hypokalemia [E87.6]  Hypomagnesemia [E83.42]  Altered mental status [R41.82]  Altered mental status, unspecified altered mental status type [R41.82]                       GMLOS: 3.3 days  Length of Stay: 11  days    Anticipated Discharge Date: Ready for discharge    Readmission Risk (Low < 19, Mod (19-27), High > 27): Readmission Risk Score: 8.5        Current Transitional Plan    [] Home Independently    [] Home with HC    [x] Skilled Nursing Facility    [] Acute Rehabilitation    [] Long Term Acute Care (LTAC)    [x] Other: Family Friend    Plan for the Stay (Medical Management) :          Workflow Continuation (Additional Notes) :      Spoke to patient's daughter, Naz who informed that the patient has a S/O who she lives. Naz doesn't know how safe it is for her mother to return back home with him but his name is Francisco and his contact number is [phone]. Naz notes she is not very close with her mother and currently lives in [place]. She is in the process in moving to [place]. She is not able to take her mother in to care for her.  Naz is also going to reach out to the patient's friend, Tracee Blevins who may take her mother in so can live with her. She is going to ask and find out. In the mean time, she is requesting that we file an appeal with the insurance company for SNF placement.    Attempted to reach out to HEL[employer] outreach program to see if pt. Would qualify for Medicaid services.     Clinical documents faxed to [facility] for appeal.     Saman Saldana  May 27, 2025           No

## 2025-05-27 NOTE — PLAN OF CARE
Patient is cognitively unsafe to return home due to patient's diagnosis of Warnicke's encephalopathy and inability to complete ADLs and care for herself without supervision, and limited family/social support at home.    Onesimo Chinchilla MD  PGY-2 Neurology Resident  5/27/2025  4:53 PM

## 2025-05-27 NOTE — PROGRESS NOTES
Facility/Department: [address] STEPDOWN   Physical Therapy Daily Treatment Note    Patient Name: Purvi Ray        MRN: [id number]    : 1970    Date of Service: 2025    Chief Complaint   Patient presents with    Altered Mental Status     Past Medical History:  has a past medical history of Asthma, Diabetes mellitus (HCC), HSV (herpes simplex virus) infection, HTN (hypertension), and Seasonal allergies.  Past Surgical History:  has a past surgical history that includes  section (); Dilation and curettage of uterus; and sterilization (2007).    Discharge Recommendations  Discharge Recommendations: Patient would benefit from continued therapy after discharge  PT Equipment Recommendations  Equipment Needed: No  Other: .    Assessment  Pt is cooperative, pleasantly confused, needed tactile and verbal cues to understand what PT wanted her to do ie unable to locate toilet paper on the toilet, paper towel dispenser after washing her hands, which room was hers, etc.  Independent bed mobility and transfers, gait w/o device 240'x2 with supervision, stair ambulation x 10 steps BHRs with supervision.   Pt was able to use the toilet independently, but needed assistance finding the toilet paper and paper towel dispenser, and had trouble following directions to assist with donning clean brief.   Body Structures, Functions, Activity Limitations Requiring Skilled Therapeutic Intervention: Decreased safe awareness;Decreased cognition;Decreased endurance;Decreased balance     Therapy Prognosis: Good  Decision Making: Medium Complexity  Requires PT Follow-Up: Yes  Activity Tolerance  Activity Tolerance: Patient tolerated treatment well  Safety Devices  Type of Devices: Call light within reach;Gait belt;Patient at risk for falls;Left in chair;Sitter present  Restraints  Restraints Initially in Place: No    AM-PAC  AM-PAC Basic Mobility - Inpatient   How much help is needed turning from your back to your

## 2025-05-27 NOTE — PLAN OF CARE
Problem: Chronic Conditions and Co-morbidities  Goal: Patient's chronic conditions and co-morbidity symptoms are monitored and maintained or improved  5/27/2025 1046 by Danuta Cho RN  Outcome: Progressing  5/27/2025 0504 by Bell Espino RN  Outcome: Progressing     Problem: Discharge Planning  Goal: Discharge to home or other facility with appropriate resources  5/27/2025 1046 by Danuta Cho RN  Outcome: Progressing  5/27/2025 0504 by Bell Espino RN  Outcome: Progressing

## 2025-05-27 NOTE — PROGRESS NOTES
Facility/Department: [address] STEPDOWN  Initial Speech/Language/Cognitive Assessment    NAME: Purvi Ray  : 1970   MRN: [id number]  ADMISSION DATE: 2025  ADMITTING DIAGNOSIS: has HTN (hypertension); Back pain; Impaired fasting glucose; Chronic obstructive pulmonary disease (HCC); Tobacco abuse; Seasonal allergic rhinitis due to pollen; Anxiety and depression; Noncompliance; Tonic-clonic seizure (HCC); Altered mental status; Lactic acidosis; Alcohol abuse; Alcohol withdrawal syndrome without complication (HCC); Hypokalemia; Seizure (HCC); Nystagmus; Alcohol use disorder, severe, dependence (HCC); Acute encephalopathy; Hypomagnesemia; and Meningitis on their problem list.      Date of Eval: 2025   Evaluating Therapist: ANUSHA Garcia    RECENT RESULTS  CT OF HEAD/MRI: 25 MRI BRAIN W WO CONTRAST   IMPRESSION:  1. Patient motion limits evaluation.  2. The T2 FLAIR hyperintensity in the medial aspect of the temporal lobes  appears less conspicuous on the current exam.  3. Otherwise, no acute intracranial abnormality. No acute infarct.  4. Mild to moderate global parenchymal volume loss with mild chronic  microvascular ischemic changes.       Primary Complaint:     Purvi Ray is a 54 y.o.  female with a history of HTN, DM, HSV, alcohol related seizures, who presents with altered mental status was found confused on the street with a bottle of vodka next to her.      Her last known well is not available at time of evaluation, EMS were called as patient was found confused. On my evaluation, patient is alert, not following commands, seems confused, not answering questions appropriate, speech is fluent but lacking comprehension. No clear dysarthria was noted. Was able to hold upper extremities antigravity and withdraws to painful stimuli on lower extremities.     LKW: unknown   NIHSS: 8  Modified [place] Scale: pre morbid N/A  SBP: 150s  Glucose: 128  CT head without contrast: no acute

## 2025-05-27 NOTE — PROGRESS NOTES
[facility] Neurology   IN-PATIENT SERVICE  General Neurology Progress Note   [facility]                Date:   5/27/2025  Patient name:  Purvi Ray  Date of admission:  5/16/2025 10:13 AM  MRN:   [id number]  Account:  [id number]  YOB: 1970  PCP:    Rebecca Leong MD  Room:   [address] [place] [address]  Code Status:    Full Code  Chief Complaint:   Chief Complaint   Patient presents with    Altered Mental Status       Interval history      Exam unchanged.    S/p IVIG x4    Denied SNF, working on home with home health vs long-term placement.     Discharge orders in.    Brief History     54-year-old woman with history of hypertension, diabetes mellitus, HSV, and alcohol use with alcohol-related seizures was found down on the street next to a bottle of vodka. Details surrounding the event are unclear. EMS was called for confusion; last known well is unknown. On initial evaluation, patient was alert to name only, unable to state the date, month, or year. She was unable to perform simple calculations or repeat phrases. Cranial nerves were grossly intact, strength was symmetrical. Speech was fluent but comprehension was impaired. No clear dysarthria. She held upper extremities antigravity and withdrew to pain in the lower extremities. NIHSS was 8. BP in the 150s, glucose 128. CT head showed no acute abnormalities.    Differential at presentation included Wernicke’s encephalopathy, alcohol-related delirium, postictal confusion, HSV encephalitis, and toxic-metabolic encephalopathy. Initial labs notable for hypokalemia (2.4), hypomagnesemia (1.5), hypocalcemia (8.2), hyponatremia, metabolic acidosis. Ammonia was 45, TSH 1.53. Toxicology screen was negative.    Following CT, her mental status improved--she was able to state her age, follow simple commands, and move all extremities without clear focal deficits.    MRI brain w/wo contrast showed high signal in the bilateral medial temporal  hypomagnesemia  Improving, continue to replace as needed    PT/OT/SLP/Social work all consulted  Diet: ADULT DIET; Regular  DVT Prophylaxis: lovenox 40 mg SC daily  Discharge Planning: Denied SNF, pt to go home with home health vs long term placement    Patient to be discussed with attending physician, Dr. Gulshan Chinchilla MD  PGY-2 Neurology Resident  5/27/2025  4:11 PM        Copy sent to Rebecca De Paz MD    This note is created with the assistance of a speech-recognition program. While intending to generate a document that actually reflects the content of the visit, the document can still have some errors including those of syntax and sound a- like substitutions which may escape proofreading. In such instances, actual meaning can be extrapolated by contextual derivation.

## 2025-05-28 LAB
ANION GAP SERPL CALCULATED.3IONS-SCNC: 12 MMOL/L (ref 9–16)
BASOPHILS # BLD: 0.04 K/UL (ref 0–0.2)
BASOPHILS NFR BLD: 1 % (ref 0–2)
BUN SERPL-MCNC: 23 MG/DL (ref 6–20)
CALCIUM SERPL-MCNC: 9.7 MG/DL (ref 8.6–10.4)
CHLORIDE SERPL-SCNC: 106 MMOL/L (ref 98–107)
CO2 SERPL-SCNC: 18 MMOL/L (ref 20–31)
CREAT SERPL-MCNC: 0.8 MG/DL (ref 0.6–0.9)
EOSINOPHIL # BLD: 0.1 K/UL (ref 0–0.44)
EOSINOPHILS RELATIVE PERCENT: 2 % (ref 1–4)
ERYTHROCYTE [DISTWIDTH] IN BLOOD BY AUTOMATED COUNT: 13.4 % (ref 11.8–14.4)
GFR, ESTIMATED: 86 ML/MIN/1.73M2
GLUCOSE SERPL-MCNC: 92 MG/DL (ref 74–99)
HCT VFR BLD AUTO: 39.6 % (ref 36.3–47.1)
HGB BLD-MCNC: 12.9 G/DL (ref 11.9–15.1)
IMM GRANULOCYTES # BLD AUTO: 0.03 K/UL (ref 0–0.3)
IMM GRANULOCYTES NFR BLD: 1 %
LYMPHOCYTES NFR BLD: 1.36 K/UL (ref 1.1–3.7)
LYMPHOCYTES RELATIVE PERCENT: 28 % (ref 24–43)
MCH RBC QN AUTO: 34.7 PG (ref 25.2–33.5)
MCHC RBC AUTO-ENTMCNC: 32.6 G/DL (ref 28.4–34.8)
MCV RBC AUTO: 106.5 FL (ref 82.6–102.9)
MONOCYTES NFR BLD: 0.43 K/UL (ref 0.1–1.2)
MONOCYTES NFR BLD: 9 % (ref 3–12)
NEUTROPHILS NFR BLD: 59 % (ref 36–65)
NEUTS SEG NFR BLD: 2.97 K/UL (ref 1.5–8.1)
NRBC BLD-RTO: 0 PER 100 WBC
PLATELET # BLD AUTO: 267 K/UL (ref 138–453)
PMV BLD AUTO: 9.6 FL (ref 8.1–13.5)
POTASSIUM SERPL-SCNC: 4.3 MMOL/L (ref 3.7–5.3)
RBC # BLD AUTO: 3.72 M/UL (ref 3.95–5.11)
RBC # BLD: ABNORMAL 10*6/UL
SODIUM SERPL-SCNC: 136 MMOL/L (ref 136–145)
WBC OTHER # BLD: 4.9 K/UL (ref 3.5–11.3)

## 2025-05-28 PROCEDURE — 97110 THERAPEUTIC EXERCISES: CPT

## 2025-05-28 PROCEDURE — 36415 COLL VENOUS BLD VENIPUNCTURE: CPT

## 2025-05-28 PROCEDURE — 97129 THER IVNTJ 1ST 15 MIN: CPT

## 2025-05-28 PROCEDURE — 6360000002 HC RX W HCPCS

## 2025-05-28 PROCEDURE — 6370000000 HC RX 637 (ALT 250 FOR IP)

## 2025-05-28 PROCEDURE — 2060000000 HC ICU INTERMEDIATE R&B

## 2025-05-28 PROCEDURE — 99232 SBSQ HOSP IP/OBS MODERATE 35: CPT | Performed by: PSYCHIATRY & NEUROLOGY

## 2025-05-28 PROCEDURE — 99232 SBSQ HOSP IP/OBS MODERATE 35: CPT | Performed by: INTERNAL MEDICINE

## 2025-05-28 PROCEDURE — 97535 SELF CARE MNGMENT TRAINING: CPT

## 2025-05-28 PROCEDURE — 97130 THER IVNTJ EA ADDL 15 MIN: CPT

## 2025-05-28 PROCEDURE — 97116 GAIT TRAINING THERAPY: CPT

## 2025-05-28 PROCEDURE — 85025 COMPLETE CBC W/AUTO DIFF WBC: CPT

## 2025-05-28 PROCEDURE — 97530 THERAPEUTIC ACTIVITIES: CPT

## 2025-05-28 PROCEDURE — 80048 BASIC METABOLIC PNL TOTAL CA: CPT

## 2025-05-28 RX ADMIN — ENOXAPARIN SODIUM 40 MG: 100 INJECTION SUBCUTANEOUS at 08:07

## 2025-05-28 RX ADMIN — Medication 100 MG: at 08:07

## 2025-05-28 RX ADMIN — FOLIC ACID 1 MG: 1 TABLET ORAL at 08:07

## 2025-05-28 RX ADMIN — QUETIAPINE FUMARATE 25 MG: 25 TABLET ORAL at 20:59

## 2025-05-28 RX ADMIN — MULTIVITAMIN TABLET 1 TABLET: TABLET at 08:07

## 2025-05-28 ASSESSMENT — ENCOUNTER SYMPTOMS
COLOR CHANGE: 0
SHORTNESS OF BREATH: 0
NAUSEA: 0
EYE REDNESS: 0
EYE DISCHARGE: 0
DIARRHEA: 0
EYE PAIN: 0
APNEA: 0
VOMITING: 0
SINUS PRESSURE: 0
SORE THROAT: 0
ABDOMINAL PAIN: 0
PHOTOPHOBIA: 0
SINUS PAIN: 0

## 2025-05-28 ASSESSMENT — PAIN SCALES - GENERAL
PAINLEVEL_OUTOF10: 0

## 2025-05-28 NOTE — PROGRESS NOTES
Occupational Therapy Daily Treatment Note  Facility/Department: [facility] STEPDOWN   Patient Name: Purvi Ray        MRN: [id number]    : 1970    Date of Service: 2025    Chief Complaint   Patient presents with    Altered Mental Status     Past Medical History:  has a past medical history of Asthma, Diabetes mellitus (HCC), HSV (herpes simplex virus) infection, HTN (hypertension), and Seasonal allergies.  Past Surgical History:  has a past surgical history that includes  section (); Dilation and curettage of uterus; and sterilization (2007).    Discharge Recommendations  Discharge Recommendations: Patient would benefit from continued therapy after discharge       Assessment  Performance deficits / Impairments: Decreased functional mobility ;Decreased cognition;Decreased high-level IADLs;Decreased ADL status;Decreased balance;Decreased safe awareness  Assessment: pt continues to demonstrate decreased cognition and unable to follow simple 2 steps commands. pt required constant cuing to complete simple grooming/ADL tasks. pt is currently unsafe to return to prior living arrangement alone d/t cognition.  Prognosis: Good  Decision Making: Medium Complexity  REQUIRES OT FOLLOW-UP: Yes  Activity Tolerance  Activity Tolerance: Patient Tolerated treatment well  Safety Devices  Type of Devices: Call light within reach;Left in chair;Gait belt;Nurse notified;Chair alarm in place  Restraints  Restraints Initially in Place: No    AM-PAC  AM-PAC Daily Activity - Inpatient   How much help is needed for putting on and taking off regular lower body clothing?: A Little  How much help is needed for bathing (which includes washing, rinsing, drying)?: A Little  How much help is needed for toileting (which includes using toilet, bedpan, or urinal)?: A Little  How much help is needed for putting on and taking off regular upper body clothing?: A Little  How much help is needed for taking care of personal

## 2025-05-28 NOTE — PROGRESS NOTES
Speech Language Pathology  [facility]    Cognitive Treatment Note    Date: 5/28/2025  Patient’s Name: Purvi Ray  MRN: [id number]  Diagnosis:   Patient Active Problem List   Diagnosis Code    HTN (hypertension) I10    Back pain M54.9    Impaired fasting glucose R73.01    Chronic obstructive pulmonary disease (HCC) J44.9    Tobacco abuse Z72.0    Seasonal allergic rhinitis due to pollen J30.1    Anxiety and depression F41.9, F32.A    Noncompliance Z91.199    Tonic-clonic seizure ([facility]) G40.409    Altered mental status R41.82    Lactic acidosis E87.20    Alcohol abuse F10.10    Alcohol withdrawal syndrome without complication ([facility]) F10.930    Hypokalemia E87.6    Seizure ([facility]) R56.9    Nystagmus H55.00    Alcohol use disorder, severe, dependence ([facility]) F10.20    Acute encephalopathy G93.40    Hypomagnesemia E83.42    Meningitis G03.9       Pain: 0/10    Cognitive Treatment    Treatment time: [id number]      Subjective: [x] Alert [x] Cooperative     [] Confused     [] Agitated    [] Lethargic      Objective/Assessment:  Attention: Intermittent verbal redirection across session. Pt would frequently begin discussing \"off the wall\" topics.     Recall: -Functional Memory Tasks (Short paragraphs with 3-4 elements): 0/3 increasing to 3/3 with max cues     Organization: -Sequencing the Steps (4-steps): 2/4 did not increase with cues, 4/4 given max cues, 4/4 given max cues     Problem Solving/Reasoning: -Stating Situational Problems: 0/3 increasing to 2/3 with max cues, 0/3 did not increase with cues, 2/3 increasing to 3/3 with max cues, 2/3 did not increase with cues. Pt would begin discussing other things despite frequent prompts (I.e, when asked \"what problems might occur when eating lunch?\", pt then began describing a lunch room at a [employer] and teachers).     -Problem Solving (missing equipment): 4/9 increasing to 7/9 given max cues    -Analyzing Problems (phone bill): 0/3 despite max cues,

## 2025-05-28 NOTE — CARE COORDINATION
Case Management   Daily Progress Note       Patient Name: Purvi Ray                   YOB: 1970  Diagnosis: Hypokalemia [E87.6]  Hypomagnesemia [E83.42]  Altered mental status [R41.82]  Altered mental status, unspecified altered mental status type [R41.82]                       GMLOS: 3.3 days  Length of Stay: 12  days    Anticipated Discharge Date: Ready for discharge    Readmission Risk (Low < 19, Mod (19-27), High > 27): Readmission Risk Score: 8.5        Current Transitional Plan    [] Home Independently    [] Home with HC    [x] Skilled Nursing Facility    [] Acute Rehabilitation    [] Long Term Acute Care (LT[facility])    [] Other:     Plan for the Stay (Medical Management) :    Stable for discharge.       Workflow Continuation (Additional Notes) :    Appointment of representative competed and faxed to [facility] appeal to represent the patient during appeal process. Pt. Updated, verbalized understanding.     Spoke to Melanie with [facility] and they are able to accept if the expedited appeal is overturned and approved through the insurance company. If the patient is pending medicaid, [facility] would be unable to accept for services. Awaiting outcome of appeal.     Saman Saldana  May 28, 2025

## 2025-05-28 NOTE — PLAN OF CARE
Problem: Chronic Conditions and Co-morbidities  Goal: Patient's chronic conditions and co-morbidity symptoms are monitored and maintained or improved  5/28/2025 1719 by Abbey Arroyo RN  Outcome: Progressing  Flowsheets (Taken 5/28/2025 0800)  Care Plan - Patient's Chronic Conditions and Co-Morbidity Symptoms are Monitored and Maintained or Improved: Monitor and assess patient's chronic conditions and comorbid symptoms for stability, deterioration, or improvement  5/28/2025 0503 by Bell Espino RN  Outcome: Progressing     Problem: Discharge Planning  Goal: Discharge to home or other facility with appropriate resources  5/28/2025 1719 by Abbey Arroyo RN  Outcome: Progressing  Flowsheets (Taken 5/28/2025 0800)  Discharge to home or other facility with appropriate resources: Identify barriers to discharge with patient and caregiver  5/28/2025 0503 by Bell Espino RN  Outcome: Progressing     Problem: Pain  Goal: Verbalizes/displays adequate comfort level or baseline comfort level  5/28/2025 1719 by Abbey Arroyo RN  Outcome: Progressing  Flowsheets  Taken 5/28/2025 1609  Verbalizes/displays adequate comfort level or baseline comfort level: Encourage patient to monitor pain and request assistance  Taken 5/28/2025 1208  Verbalizes/displays adequate comfort level or baseline comfort level: Encourage patient to monitor pain and request assistance  Taken 5/28/2025 0810  Verbalizes/displays adequate comfort level or baseline comfort level: Encourage patient to monitor pain and request assistance  5/28/2025 0503 by Bell Espino RN  Outcome: Progressing     Problem: Safety - Adult  Goal: Free from fall injury  5/28/2025 1719 by Abbey Arroyo RN  Outcome: Progressing  Flowsheets (Taken 5/28/2025 0800)  Free From Fall Injury: Instruct family/caregiver on patient safety  5/28/2025 0503 by Bell Espino RN  Outcome: Progressing     Problem: ABCDS Injury Assessment  Goal: Absence of physical injury  5/28/2025 1719 by  (Taken 5/28/2025 0800)  Incisions, Wounds, or Drain Sites Healing Without Sign and Symptoms of Infection: ADMISSION and DAILY: Assess and document risk factors for pressure ulcer development  Goal: Oral mucous membranes remain intact  Outcome: Progressing  Flowsheets (Taken 5/28/2025 0800)  Oral Mucous Membranes Remain Intact: Assess oral mucosa and hygiene practices     Problem: Genitourinary - Adult  Goal: Absence of urinary retention  Outcome: Progressing  Flowsheets (Taken 5/28/2025 0800)  Absence of urinary retention: Assess patient’s ability to void and empty bladder     Problem: Respiratory - Adult  Goal: Achieves optimal ventilation and oxygenation  Outcome: Progressing  Flowsheets (Taken 5/28/2025 0800)  Achieves optimal ventilation and oxygenation: Assess for changes in respiratory status     Problem: Musculoskeletal - Adult  Goal: Return mobility to safest level of function  Outcome: Progressing  Flowsheets (Taken 5/28/2025 0800)  Return Mobility to Safest Level of Function: Assess patient stability and activity tolerance for standing, transferring and ambulating with or without assistive devices  Goal: Maintain proper alignment of affected body part  Outcome: Progressing  Flowsheets (Taken 5/28/2025 0800)  Maintain proper alignment of affected body part: Support and protect limb and body alignment per provider's orders  Goal: Return ADL status to a safe level of function  Outcome: Progressing  Flowsheets (Taken 5/28/2025 0800)  Return ADL Status to a Safe Level of Function: Administer medication as ordered     Problem: Nutrition Deficit:  Goal: Optimize nutritional status  5/28/2025 1719 by Abbey Arroyo, RN  Outcome: Progressing  5/28/2025 0503 by Bell Espino, RN  Outcome: Progressing

## 2025-05-28 NOTE — PROGRESS NOTES
Physical Therapy  Facility/Department: [address] STEPDOWN   Physical Therapy Daily Treatment Note    Patient Name: Purvi Ray        MRN: [id number]    : 1970    Date of Service: 2025    Chief Complaint   Patient presents with    Altered Mental Status     Past Medical History:  has a past medical history of Asthma, Diabetes mellitus (HCC), HSV (herpes simplex virus) infection, HTN (hypertension), and Seasonal allergies.  Past Surgical History:  has a past surgical history that includes  section (); Dilation and curettage of uterus; and sterilization (2007).    Discharge Recommendations  Discharge Recommendations: Patient would benefit from continued therapy after discharge  PT Equipment Recommendations  Equipment Needed: No  Other: .    Assessment  Body Structures, Functions, Activity Limitations Requiring Skilled Therapeutic Intervention: Decreased safe awareness;Decreased cognition;Decreased endurance;Decreased balance  Assessment: Pt ambulated 250ft x2 with no AD SBA, required max cueing for direction and safety. Pt also performed 2 flights of stairs with R HR and CGA. Pt most limited by significant decreased cognition, needing cueing throughout for safety d/t impulsiveness and also attempting to enter other patients room d/t confusion. Recommending continued PT to address deficits and maximize safety and independence with mobility. Pt is a fall risk d/t impulsivity.  Therapy Prognosis: Good  Requires PT Follow-Up: Yes  Activity Tolerance  Activity Tolerance: Patient tolerated treatment well  Safety Devices  Type of Devices: Call light within reach;Gait belt;Patient at risk for falls;Left in chair;Chair alarm in place;Nurse notified  Restraints  Restraints Initially in Place: No    AM-PAC  AM-PAC Basic Mobility - Inpatient   How much help is needed turning from your back to your side while in a flat bed without using bedrails?: None  How much help is needed moving from lying on

## 2025-05-28 NOTE — PROGRESS NOTES
[facility] Neurology   IN-PATIENT SERVICE  General Neurology Progress Note   [facility]                Date:   5/28/2025  Patient name:  Purvi Ray  Date of admission:  5/16/2025 10:13 AM  MRN:   [id number]  Account:  [id number]  YOB: 1970  PCP:    Rebecca Leong MD  Room:   [address] [place] [address]  Code Status:    Full Code  Chief Complaint:   Chief Complaint   Patient presents with    Altered Mental Status       Interval history      Exam unchanged.    Denied SNF, appeal pending. Otherwise plan for home with home health    Discharge orders in.    Brief History     54-year-old woman with history of hypertension, diabetes mellitus, HSV, and alcohol use with alcohol-related seizures was found down on the street next to a bottle of vodka. Details surrounding the event are unclear. EMS was called for confusion; last known well is unknown. On initial evaluation, patient was alert to name only, unable to state the date, month, or year. She was unable to perform simple calculations or repeat phrases. Cranial nerves were grossly intact, strength was symmetrical. Speech was fluent but comprehension was impaired. No clear dysarthria. She held upper extremities antigravity and withdrew to pain in the lower extremities. NIHSS was 8. BP in the 150s, glucose 128. CT head showed no acute abnormalities.    Differential at presentation included Wernicke’s encephalopathy, alcohol-related delirium, postictal confusion, HSV encephalitis, and toxic-metabolic encephalopathy. Initial labs notable for hypokalemia (2.4), hypomagnesemia (1.5), hypocalcemia (8.2), hyponatremia, metabolic acidosis. Ammonia was 45, TSH 1.53. Toxicology screen was negative.    Following CT, her mental status improved--she was able to state her age, follow simple commands, and move all extremities without clear focal deficits.    MRI brain w/wo contrast showed high signal in the bilateral medial temporal lobes concerning for  process.     CTA HEAD NECK W CONTRAST  Result Date: 5/16/2025  1. No flow limiting stenosis or dissection of the cervical carotid/vertebral arteries. 2. No significant stenosis or large vessel occlusion of the [id number].     CT HEAD WO CONTRAST  Result Date: 5/16/2025  No acute intracranial abnormality.       Assessment & Plan     Impression: Purvi Ray is a 54 y.o. female with a history of alcohol-related seizures, HSV, and poor baseline control of comorbidities presenting with acute encephalopathy of unclear duration. Differential includes HSV encephalitis (temporal lobe signal on MRI), Wernicke’s encephalopathy, TGA, postictal state, and toxic-metabolic causes in the setting of multiple electrolyte derangements. Awaiting CSF and EEG. Empirically treated with acyclovir, ceftriaxone, thiamine, and IVIG.     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 (subtle MRI changes, elevated protein CSF)  MRI limited brain c/f encephalitis vs artifact  Repeat MRI brain 5/18 - Limited eval but less conspicuous  CT chest abdomen pelvis to look for any suspicious masses - Negative 5/18  ESR normal 13, CRP 5.8  EEG awake 5/17- unremarkable, LTM EEG monitoring has been normal so far  Failed bedside LP x 2, IR guided LP with 2 cc of CSF   Meningitis/encephalitis panel negative  ID consulted, appreciate recs  S/p ceftriaxone 2 g IV BID x 7 per ID  Acyclovir DC'd due to negative panel  Thiamine folate and multivitamin  Sitter at bedside  5/22 unsuccessful LP by IR despite 4 milligram of Ativan, 1 mL of bloody mixed with CSF obtained, protein CSF significantly elevated 412, glucose 52.  Given subtle abnormality on MRI scan brain along with elevated CSF protein, concern for inflammatory encephalitis  S/p IVIG 0.4 g/kg x 4 days      Hypokalemia, hypomagnesemia  Improving, continue to replace as

## 2025-05-28 NOTE — PLAN OF CARE
Problem: Chronic Conditions and Co-morbidities  Goal: Patient's chronic conditions and co-morbidity symptoms are monitored and maintained or improved  Outcome: Progressing     Problem: Discharge Planning  Goal: Discharge to home or other facility with appropriate resources  Outcome: Progressing     Problem: Pain  Goal: Verbalizes/displays adequate comfort level or baseline comfort level  Outcome: Progressing     Problem: Safety - Adult  Goal: Free from fall injury  Outcome: Progressing     Problem: ABCDS Injury Assessment  Goal: Absence of physical injury  Outcome: Progressing     Problem: Nutrition Deficit:  Goal: Optimize nutritional status  Outcome: Progressing

## 2025-05-29 LAB
ANION GAP SERPL CALCULATED.3IONS-SCNC: 11 MMOL/L (ref 9–16)
BASOPHILS # BLD: 0.05 K/UL (ref 0–0.2)
BASOPHILS NFR BLD: 1 % (ref 0–2)
BUN SERPL-MCNC: 21 MG/DL (ref 6–20)
CALCIUM SERPL-MCNC: 9.6 MG/DL (ref 8.6–10.4)
CHLORIDE SERPL-SCNC: 106 MMOL/L (ref 98–107)
CO2 SERPL-SCNC: 19 MMOL/L (ref 20–31)
CREAT SERPL-MCNC: 0.8 MG/DL (ref 0.6–0.9)
EOSINOPHIL # BLD: 0.14 K/UL (ref 0–0.44)
EOSINOPHILS RELATIVE PERCENT: 3 % (ref 1–4)
ERYTHROCYTE [DISTWIDTH] IN BLOOD BY AUTOMATED COUNT: 13.6 % (ref 11.8–14.4)
GFR, ESTIMATED: 86 ML/MIN/1.73M2
GLUCOSE SERPL-MCNC: 90 MG/DL (ref 74–99)
HCT VFR BLD AUTO: 38.9 % (ref 36.3–47.1)
HGB BLD-MCNC: 12.8 G/DL (ref 11.9–15.1)
IMM GRANULOCYTES # BLD AUTO: 0.06 K/UL (ref 0–0.3)
IMM GRANULOCYTES NFR BLD: 1 %
LYMPHOCYTES NFR BLD: 1.56 K/UL (ref 1.1–3.7)
LYMPHOCYTES RELATIVE PERCENT: 33 % (ref 24–43)
MCH RBC QN AUTO: 34.8 PG (ref 25.2–33.5)
MCHC RBC AUTO-ENTMCNC: 32.9 G/DL (ref 28.4–34.8)
MCV RBC AUTO: 105.7 FL (ref 82.6–102.9)
MONOCYTES NFR BLD: 0.39 K/UL (ref 0.1–1.2)
MONOCYTES NFR BLD: 8 % (ref 3–12)
NEUTROPHILS NFR BLD: 54 % (ref 36–65)
NEUTS SEG NFR BLD: 2.58 K/UL (ref 1.5–8.1)
NRBC BLD-RTO: 0.4 PER 100 WBC
PLATELET # BLD AUTO: 268 K/UL (ref 138–453)
PMV BLD AUTO: 9.4 FL (ref 8.1–13.5)
POTASSIUM SERPL-SCNC: 4.3 MMOL/L (ref 3.7–5.3)
RBC # BLD AUTO: 3.68 M/UL (ref 3.95–5.11)
RBC # BLD: ABNORMAL 10*6/UL
SODIUM SERPL-SCNC: 136 MMOL/L (ref 136–145)
WBC OTHER # BLD: 4.8 K/UL (ref 3.5–11.3)

## 2025-05-29 PROCEDURE — 99232 SBSQ HOSP IP/OBS MODERATE 35: CPT | Performed by: PSYCHIATRY & NEUROLOGY

## 2025-05-29 PROCEDURE — 85025 COMPLETE CBC W/AUTO DIFF WBC: CPT

## 2025-05-29 PROCEDURE — 36415 COLL VENOUS BLD VENIPUNCTURE: CPT

## 2025-05-29 PROCEDURE — 6370000000 HC RX 637 (ALT 250 FOR IP)

## 2025-05-29 PROCEDURE — 99232 SBSQ HOSP IP/OBS MODERATE 35: CPT | Performed by: INTERNAL MEDICINE

## 2025-05-29 PROCEDURE — 97129 THER IVNTJ 1ST 15 MIN: CPT

## 2025-05-29 PROCEDURE — 80048 BASIC METABOLIC PNL TOTAL CA: CPT

## 2025-05-29 PROCEDURE — 2060000000 HC ICU INTERMEDIATE R&B

## 2025-05-29 PROCEDURE — 6360000002 HC RX W HCPCS

## 2025-05-29 RX ADMIN — FOLIC ACID 1 MG: 1 TABLET ORAL at 09:05

## 2025-05-29 RX ADMIN — ENOXAPARIN SODIUM 40 MG: 100 INJECTION SUBCUTANEOUS at 09:05

## 2025-05-29 RX ADMIN — MULTIVITAMIN TABLET 1 TABLET: TABLET at 09:05

## 2025-05-29 RX ADMIN — Medication 100 MG: at 09:05

## 2025-05-29 RX ADMIN — QUETIAPINE FUMARATE 25 MG: 25 TABLET ORAL at 21:19

## 2025-05-29 ASSESSMENT — PAIN SCALES - GENERAL
PAINLEVEL_OUTOF10: 0

## 2025-05-29 ASSESSMENT — ENCOUNTER SYMPTOMS
ABDOMINAL PAIN: 0
SINUS PAIN: 0
SHORTNESS OF BREATH: 0
SORE THROAT: 0
VOMITING: 0
NAUSEA: 0
SINUS PRESSURE: 0
DIARRHEA: 0

## 2025-05-29 NOTE — PROGRESS NOTES
Speech Language Pathology  [facility]    Cognitive Treatment Note    Date: 5/29/2025  Patient’s Name: Purvi Ray  MRN: [id number]  Diagnosis:   Patient Active Problem List   Diagnosis Code    HTN (hypertension) I10    Back pain M54.9    Impaired fasting glucose R73.01    Chronic obstructive pulmonary disease (HCC) J44.9    Tobacco abuse Z72.0    Seasonal allergic rhinitis due to pollen J30.1    Anxiety and depression F41.9, F32.A    Noncompliance Z91.199    Tonic-clonic seizure ([facility]) G40.409    Altered mental status R41.82    Lactic acidosis E87.20    Alcohol abuse F10.10    Alcohol withdrawal syndrome without complication ([facility]ly [facility]) F10.930    Hypokalemia E87.6    Seizure ([facility]ly [facility]) R56.9    Nystagmus H55.00    Alcohol use disorder, severe, dependence ([facility]ly [facility]) F10.20    Acute encephalopathy G93.40    Hypomagnesemia E83.42    Meningitis G03.9       Pain: 0/10    Cognitive Treatment    Treatment time: 6842-4268      Subjective: [x] Alert [x] Cooperative     [] Confused     [] Agitated    [] Lethargic      Objective/Assessment:  Attention: Frequent verbal redirection this date as pt would partake in conversational speech.     Organization: -Sequencing (4-step): 0/2 independently increasing to 2/2 with max verbal/visual cues    Problem Solving/Reasoning: -Analyzing Problems (bank statement): 1/3, did not increase with max verbal/visual cues.     Other: Very pleasant across session. Pt stating she needed to use the restroom, ST encouraging pt to stay seated and utilize call light, ST discontinued session.     Plan:  [] Continue ST services    [] Discharge from ST:      Discharge recommendations: []  Further therapy recommended at discharge.The patient should be able to tolerate at least 3 hours of therapy per day over 5 days or 15 hours over 7 days. [x] Further therapy recommended at discharge.   [] No therapy recommended at discharge.          Treatment completed by:  Ирина Singh M.A., CCC-SLP

## 2025-05-29 NOTE — PROGRESS NOTES
HTN (hypertension)     Seasonal allergies        Past Surgical  History:     Past Surgical History:   Procedure Laterality Date     SECTION  2006    DILATION AND CURETTAGE OF UTERUS      x 2    STERILIZATION  2007    hysteroscopic blockage of left fallopian tube       Medications:      thiamine  100 mg Oral Daily    QUEtiapine  25 mg Oral Nightly    folic acid  1 mg Oral Daily    multivitamin  1 tablet Oral Daily    sodium chloride flush  5-40 mL IntraVENous 2 times per day    enoxaparin  40 mg SubCUTAneous Daily       Social History:     Social History     Socioeconomic History    Marital status: Single     Spouse name: Not on file    Number of children: Not on file    Years of education: Not on file    Highest education level: Not on file   Occupational History    Not on file   Tobacco Use    Smoking status: Every Day     Current packs/day: 1.00     Average packs/day: 1 pack/day for 10.0 years (10.0 ttl pk-yrs)     Types: Cigarettes    Smokeless tobacco: Never   Substance and Sexual Activity    Alcohol use: Yes     Comment: occasionally    Drug use: No    Sexual activity: Not on file   Other Topics Concern    Not on file   Social History Narrative    Not on file     Social Drivers of Health     Financial Resource Strain: Not on file   Food Insecurity: Food Insecurity Present (10/17/2023)    [facility]    Hunger Screening     Within the past 12 months we worried whether our food would run out before we got money to buy more.: Often True     Within the past 12 months the food we bought just didn't last and we didn't have money to get more.: Often True   Transportation Needs: Not on file   Physical Activity: Not on file   Stress: Not on file   Social Connections: Not on file   Intimate Partner Violence: Not on file   Housing Stability: Not on file       Family History:     Family History   Problem Relation Age of Onset    Diabetes Mother     Heart

## 2025-05-29 NOTE — PLAN OF CARE
Problem: Chronic Conditions and Co-morbidities  Goal: Patient's chronic conditions and co-morbidity symptoms are monitored and maintained or improved  5/28/2025 1719 by Abbey Arroyo RN  Outcome: Progressing  Flowsheets (Taken 5/28/2025 0800)  Care Plan - Patient's Chronic Conditions and Co-Morbidity Symptoms are Monitored and Maintained or Improved: Monitor and assess patient's chronic conditions and comorbid symptoms for stability, deterioration, or improvement     Problem: Discharge Planning  Goal: Discharge to home or other facility with appropriate resources  5/28/2025 1719 by Abbey Arroyo RN  Outcome: Progressing  Flowsheets (Taken 5/28/2025 0800)  Discharge to home or other facility with appropriate resources: Identify barriers to discharge with patient and caregiver     Problem: Safety - Adult  Goal: Free from fall injury  Recent Flowsheet Documentation  Taken 5/28/2025 2000 by Prerna Kenney RN  Free From Fall Injury: Instruct family/caregiver on patient safety  5/28/2025 1719 by Abbey Arroyo RN  Outcome: Progressing  Flowsheets (Taken 5/28/2025 0800)  Free From Fall Injury: Instruct family/caregiver on patient safety     Problem: Neurosensory - Adult  Goal: Achieves stable or improved neurological status  Recent Flowsheet Documentation  Taken 5/28/2025 2000 by Prerna Kenney RN  Achieves stable or improved neurological status: Assess for and report changes in neurological status  5/28/2025 1719 by Abbey Arroyo RN  Outcome: Progressing  Flowsheets (Taken 5/28/2025 0800)  Achieves stable or improved neurological status: Assess for and report changes in neurological status     Problem: Neurosensory - Adult  Goal: Absence of seizures  Recent Flowsheet Documentation  Taken 5/28/2025 2000 by Prerna Kenney RN  Absence of seizures: Monitor for seizure activity.  If seizure occurs, document type and location of movements and any associated apnea  5/28/2025 1719 by Abbey Arroyo RN  Outcome:

## 2025-05-29 NOTE — PLAN OF CARE
Problem: Chronic Conditions and Co-morbidities  Goal: Patient's chronic conditions and co-morbidity symptoms are monitored and maintained or improved  Outcome: Progressing  Flowsheets (Taken 5/29/2025 0800)  Care Plan - Patient's Chronic Conditions and Co-Morbidity Symptoms are Monitored and Maintained or Improved: Monitor and assess patient's chronic conditions and comorbid symptoms for stability, deterioration, or improvement     Problem: Discharge Planning  Goal: Discharge to home or other facility with appropriate resources  Outcome: Progressing  Flowsheets (Taken 5/29/2025 0800)  Discharge to home or other facility with appropriate resources: Identify barriers to discharge with patient and caregiver     Problem: Pain  Goal: Verbalizes/displays adequate comfort level or baseline comfort level  Outcome: Progressing  Flowsheets  Taken 5/29/2025 1511  Verbalizes/displays adequate comfort level or baseline comfort level: Encourage patient to monitor pain and request assistance  Taken 5/29/2025 1200  Verbalizes/displays adequate comfort level or baseline comfort level: Encourage patient to monitor pain and request assistance  Taken 5/29/2025 0830  Verbalizes/displays adequate comfort level or baseline comfort level: Encourage patient to monitor pain and request assistance     Problem: Safety - Adult  Goal: Free from fall injury  Outcome: Progressing  Flowsheets (Taken 5/29/2025 0800)  Free From Fall Injury: Instruct family/caregiver on patient safety     Problem: ABCDS Injury Assessment  Goal: Absence of physical injury  Outcome: Progressing  Flowsheets (Taken 5/29/2025 0800)  Absence of Physical Injury: Implement safety measures based on patient assessment     Problem: Neurosensory - Adult  Goal: Achieves stable or improved neurological status  Outcome: Progressing  Flowsheets (Taken 5/29/2025 0800)  Achieves stable or improved neurological status: Assess for and report changes in neurological status  Goal:  Absence of seizures  Outcome: Progressing  Flowsheets (Taken 5/29/2025 0800)  Absence of seizures: Monitor for seizure activity.  If seizure occurs, document type and location of movements and any associated apnea  Goal: Remains free of injury related to seizures activity  Outcome: Progressing  Flowsheets (Taken 5/29/2025 0800)  Remains free of injury related to seizure activity: Maintain airway, patient safety  and administer oxygen as ordered  Goal: Achieves maximal functionality and self care  Outcome: Progressing  Flowsheets (Taken 5/29/2025 0800)  Achieves maximal functionality and self care: Monitor swallowing and airway patency with patient fatigue and changes in neurological status     Problem: Cardiovascular - Adult  Goal: Maintains optimal cardiac output and hemodynamic stability  Outcome: Progressing  Flowsheets (Taken 5/29/2025 0800)  Maintains optimal cardiac output and hemodynamic stability: Monitor blood pressure and heart rate  Goal: Absence of cardiac dysrhythmias or at baseline  Outcome: Progressing  Flowsheets (Taken 5/29/2025 0800)  Absence of cardiac dysrhythmias or at baseline: Monitor cardiac rate and rhythm     Problem: Skin/Tissue Integrity - Adult  Goal: Skin integrity remains intact  Outcome: Progressing  Flowsheets (Taken 5/29/2025 0800)  Skin Integrity Remains Intact: Assess vascular access sites hourly  Goal: Incisions, wounds, or drain sites healing without S/S of infection  Outcome: Progressing  Flowsheets (Taken 5/29/2025 0800)  Incisions, Wounds, or Drain Sites Healing Without Sign and Symptoms of Infection: ADMISSION and DAILY: Assess and document risk factors for pressure ulcer development  Goal: Oral mucous membranes remain intact  Outcome: Progressing  Flowsheets (Taken 5/29/2025 0800)  Oral Mucous Membranes Remain Intact: Assess oral mucosa and hygiene practices     Problem: Genitourinary - Adult  Goal: Absence of urinary retention  Outcome: Progressing  Flowsheets (Taken

## 2025-05-29 NOTE — PROGRESS NOTES
Value Ref Range    WBC 4.8 3.5 - 11.3 k/uL    RBC 3.68 (L) 3.95 - 5.11 m/uL    Hemoglobin 12.8 11.9 - 15.1 g/dL    Hematocrit 38.9 36.3 - 47.1 %    .7 (H) 82.6 - 102.9 fL    MCH 34.8 (H) 25.2 - 33.5 pg    MCHC 32.9 28.4 - 34.8 g/dL    RDW 13.6 11.8 - 14.4 %    Platelets 268 138 - 453 k/uL    MPV 9.4 8.1 - 13.5 fL    NRBC Automated 0.4 (H) 0.0 per 100 WBC    Neutrophils % 54 36 - 65 %    Lymphocytes % 33 24 - 43 %    Monocytes % 8 3 - 12 %    Eosinophils % 3 1 - 4 %    Basophils % 1 0 - 2 %    Immature Granulocytes % 1 (H) 0 %    Neutrophils Absolute 2.58 1.50 - 8.10 k/uL    Lymphocytes Absolute 1.56 1.10 - 3.70 k/uL    Monocytes Absolute 0.39 0.10 - 1.20 k/uL    Eosinophils Absolute 0.14 0.00 - 0.44 k/uL    Basophils Absolute 0.05 0.00 - 0.20 k/uL    Immature Granulocytes Absolute 0.06 0.00 - 0.30 k/uL    RBC Morphology MACROCYTOSIS PRESENT          Radiology:  MRI LIMITED BRAIN  Result Date: 5/16/2025  1. Limited brain MRI performed using the stroke protocol. 2. Cerebral atrophy. Chronic small vessel ischemic changes. 3. No areas of restricted diffusion to suggest an acute ischemic event. 4. High signal in the medial aspect of the temporal lobes bilaterally. This could be artifact or related to pathology. Herpes encephalitis should be excluded.     XR ABDOMEN (KUB) (SINGLE AP VIEW)  Result Date: 5/16/2025  Essure device within the pelvis.     XR CHEST PORTABLE  Result Date: 5/16/2025  1. No acute process.     CTA HEAD NECK W CONTRAST  Result Date: 5/16/2025  1. No flow limiting stenosis or dissection of the cervical carotid/vertebral arteries. 2. No significant stenosis or large vessel occlusion of the [id number].     CT HEAD WO CONTRAST  Result Date: 5/16/2025  No acute intracranial abnormality.       Assessment & Plan     Impression: Purvi Ray is a 54 y.o. female with a history of alcohol-related seizures, HSV, and poor baseline control of comorbidities presenting with acute encephalopathy of

## 2025-05-29 NOTE — CARE COORDINATION
Message from [facility] that patient appeal has been overturned and patient is approved to go to [facility] 5/29/25. Call to [facility] and can accept today. Went to notify patient and states is going home. Patient is confused, so called daughter Naz and left VM to call CM back.    Call again to Naz and did answer. Naz is agreeable to [facility] and is unable to provide any care for mother and has no one else to assist and patient cannot live alone

## 2025-05-30 VITALS
TEMPERATURE: 97.4 F | DIASTOLIC BLOOD PRESSURE: 64 MMHG | SYSTOLIC BLOOD PRESSURE: 101 MMHG | BODY MASS INDEX: 34 KG/M2 | HEART RATE: 90 BPM | WEIGHT: 184.75 LBS | RESPIRATION RATE: 17 BRPM | HEIGHT: 62 IN | OXYGEN SATURATION: 98 %

## 2025-05-30 LAB
ANION GAP SERPL CALCULATED.3IONS-SCNC: 12 MMOL/L (ref 9–16)
BASOPHILS # BLD: 0.04 K/UL (ref 0–0.2)
BASOPHILS NFR BLD: 1 % (ref 0–2)
BUN SERPL-MCNC: 22 MG/DL (ref 6–20)
CALCIUM SERPL-MCNC: 9.5 MG/DL (ref 8.6–10.4)
CHLORIDE SERPL-SCNC: 104 MMOL/L (ref 98–107)
CO2 SERPL-SCNC: 19 MMOL/L (ref 20–31)
CREAT SERPL-MCNC: 0.9 MG/DL (ref 0.6–0.9)
EOSINOPHIL # BLD: 0.12 K/UL (ref 0–0.44)
EOSINOPHILS RELATIVE PERCENT: 2 % (ref 1–4)
ERYTHROCYTE [DISTWIDTH] IN BLOOD BY AUTOMATED COUNT: 13.6 % (ref 11.8–14.4)
GFR, ESTIMATED: 75 ML/MIN/1.73M2
GLUCOSE SERPL-MCNC: 95 MG/DL (ref 74–99)
HCT VFR BLD AUTO: 37.4 % (ref 36.3–47.1)
HGB BLD-MCNC: 12.2 G/DL (ref 11.9–15.1)
IMM GRANULOCYTES # BLD AUTO: 0.03 K/UL (ref 0–0.3)
IMM GRANULOCYTES NFR BLD: 1 %
LYMPHOCYTES NFR BLD: 1.36 K/UL (ref 1.1–3.7)
LYMPHOCYTES RELATIVE PERCENT: 23 % (ref 24–43)
MCH RBC QN AUTO: 34.8 PG (ref 25.2–33.5)
MCHC RBC AUTO-ENTMCNC: 32.6 G/DL (ref 28.4–34.8)
MCV RBC AUTO: 106.6 FL (ref 82.6–102.9)
MONOCYTES NFR BLD: 0.47 K/UL (ref 0.1–1.2)
MONOCYTES NFR BLD: 8 % (ref 3–12)
NEUTROPHILS NFR BLD: 65 % (ref 36–65)
NEUTS SEG NFR BLD: 3.8 K/UL (ref 1.5–8.1)
NRBC BLD-RTO: 0 PER 100 WBC
PLATELET # BLD AUTO: 258 K/UL (ref 138–453)
PMV BLD AUTO: 9 FL (ref 8.1–13.5)
POTASSIUM SERPL-SCNC: 4.4 MMOL/L (ref 3.7–5.3)
RBC # BLD AUTO: 3.51 M/UL (ref 3.95–5.11)
RBC # BLD: ABNORMAL 10*6/UL
SODIUM SERPL-SCNC: 135 MMOL/L (ref 136–145)
WBC OTHER # BLD: 5.8 K/UL (ref 3.5–11.3)

## 2025-05-30 PROCEDURE — 6360000002 HC RX W HCPCS

## 2025-05-30 PROCEDURE — 6370000000 HC RX 637 (ALT 250 FOR IP)

## 2025-05-30 PROCEDURE — 80048 BASIC METABOLIC PNL TOTAL CA: CPT

## 2025-05-30 PROCEDURE — 85025 COMPLETE CBC W/AUTO DIFF WBC: CPT

## 2025-05-30 PROCEDURE — 99232 SBSQ HOSP IP/OBS MODERATE 35: CPT | Performed by: PSYCHIATRY & NEUROLOGY

## 2025-05-30 PROCEDURE — 36415 COLL VENOUS BLD VENIPUNCTURE: CPT

## 2025-05-30 RX ADMIN — ENOXAPARIN SODIUM 40 MG: 100 INJECTION SUBCUTANEOUS at 09:48

## 2025-05-30 RX ADMIN — FOLIC ACID 1 MG: 1 TABLET ORAL at 09:47

## 2025-05-30 RX ADMIN — MULTIVITAMIN TABLET 1 TABLET: TABLET at 09:47

## 2025-05-30 RX ADMIN — Medication 100 MG: at 09:47

## 2025-05-30 ASSESSMENT — ENCOUNTER SYMPTOMS
EYE DISCHARGE: 0
SHORTNESS OF BREATH: 0
EYE PAIN: 0
ABDOMINAL PAIN: 0
PHOTOPHOBIA: 0
APNEA: 0
EYE REDNESS: 0
COLOR CHANGE: 0
VOMITING: 0
NAUSEA: 0
SINUS PRESSURE: 0
SINUS PAIN: 0
DIARRHEA: 0
SORE THROAT: 0

## 2025-05-30 NOTE — PLAN OF CARE
Problem: Discharge Planning  Goal: Discharge to home or other facility with appropriate resources  5/30/2025 0551 by Fili Bauer, RN  Outcome: Progressing     Problem: Pain  Goal: Verbalizes/displays adequate comfort level or baseline comfort level  5/30/2025 0551 by Fili Bauer, RN  Outcome: Progressing     Problem: Safety - Adult  Goal: Free from fall injury  5/30/2025 0551 by Fili Bauer, RN  Outcome: Progressing     Problem: Neurosensory - Adult  Goal: Achieves stable or improved neurological status  5/30/2025 0551 by Fili Bauer, RN  Outcome: Progressing     Problem: Skin/Tissue Integrity - Adult  Goal: Skin integrity remains intact  5/30/2025 0551 by Fiil Bauer, RN  Outcome: Progressing     Problem: Nutrition Deficit:  Goal: Optimize nutritional status  5/30/2025 0551 by Fili Bauer, RN  Outcome: Progressing

## 2025-05-30 NOTE — PROGRESS NOTES
[facility]  Speech Language Pathology    Date: 5/30/2025  Patient Name: Purvi Ray  YOB: 1970   AGE: 54 y.o.  MRN: [id number]        Patient Not Available for Speech Therapy     Due to:  [] Testing  [] Hemodialysis  [] Cancelled by RN  [] Surgery   [] Intubation/Sedation/Pain Medication  [] Medical instability  [x] Other: Pt was discharged.     Ayesha Lacey M.A. CF-SLP

## 2025-05-30 NOTE — DISCHARGE INSTR - COC
Continuity of Care Form    Patient Name: Purvi Ray   :  1970  MRN:  [id number]    Admit date:  2025  Discharge date:  2025    Code Status Order: Full Code   Advance Directives:     Admitting Physician:  Fabrizio Jimenez MD  PCP: Rebecca Leong MD    Discharging Nurse: Yadira ZHOU RN  Discharging Hospital Unit/Room#: 0146/0146-01  Discharging Unit Phone Number: [phone]    Emergency Contact:   Extended Emergency Contact Information  Primary Emergency Contact: Naz Ray   [facility]  Home Phone: [phone]  Relation: Child  Secondary Emergency Contact: Zena Melton  Home Phone: [phone]  Mobile Phone: [phone]  Relation: Other Relative    Past Surgical History:  Past Surgical History:   Procedure Laterality Date     SECTION  2006    DILATION AND CURETTAGE OF UTERUS      x 2    STERILIZATION  2007    hysteroscopic blockage of left fallopian tube       Immunization History:   Immunization History   Administered Date(s) Administered    COVID-19, MODERNA Bivalent, (age 12y+), IM, 50 mcg/0.5 mL 2022    COVID-19, PFIZER PURPLE top, DILUTE for use, (age 12 y+), 30mcg/0.3mL 2021, 10/06/2021    Influenza Virus Vaccine 2017    Influenza, AFLURIA (age 3 y+), FLUZONE, (age 6 mo+), Quadv MDV, 0.5mL 10/03/2016, 2019    Influenza, FLUARIX, FLULAVAL, FLUZONE (age 6 mo+) and AFLURIA, (age 3 y+), Quadv PF, 0.5mL 10/26/2015, 2017, 2018    Influenza, FLUZONE High Dose (age 65 y+), IM, Quadv, 0.7mL 10/09/2020    Pneumococcal, PCV-13, PREVNAR 13, (age 6w+), IM, 0.5mL 2017, 2019       Active Problems:  Patient Active Problem List   Diagnosis Code    HTN (hypertension) I10    Back pain M54.9    Impaired fasting glucose R73.01    Chronic obstructive pulmonary disease (HCC) J44.9    Tobacco abuse Z72.0    Seasonal allergic rhinitis due to pollen J30.1    Anxiety and depression F41.9, F32.A    Noncompliance Z91.199     Therapy:  is not on home oxygen therapy.  Ventilator:    - No ventilator support    Rehab Therapies: n/a  Weight Bearing Status/Restrictions: No weight bearing restrictions  Other Medical Equipment (for information only, NOT a DME order):    Other Treatments:     Patient's personal belongings (please select all that are sent with patient):       RN SIGNATURE:  Electronically signed by Cee Penny RN on 5/30/25 at 9:20 AM EDT    CASE MANAGEMENT/SOCIAL WORK SECTION    Inpatient Status Date: ***    Readmission Risk Assessment Score:  [facility] RISK OF UNPLANNED READMISSION 2.0             8.7 Total Score        Discharging to Facility/ Agency   Name: [facility]      / signature: Electronically signed by Saman Saldana on 5/30/25 at 8:40 AM EDT    PHYSICIAN SECTION    Prognosis: Fair    Condition at Discharge: Stable    Rehab Potential (if transferring to Rehab): Fair    Recommended Labs or Other Treatments After Discharge: PT/OT SLP    Physician Certification: I certify the above information and transfer of Purvi Ray  is necessary for the continuing treatment of the diagnosis listed and that she requires Skilled Nursing Facility for greater 30 days.     Update Admission H&P: No change in H&P    PHYSICIAN SIGNATURE:  Electronically signed by PRANAY MOODY MD on 5/30/25 at 8:49 AM EDT

## 2025-05-30 NOTE — CARE COORDINATION
Case Management   Daily Progress Note       Patient Name: Purvi Ray                   YOB: 1970  Diagnosis: Hypokalemia [E87.6]  Hypomagnesemia [E83.42]  Altered mental status [R41.82]  Altered mental status, unspecified altered mental status type [R41.82]                       GMLOS: 3.3 days  Length of Stay: 14  days    Anticipated Discharge Date: Ready for discharge    Readmission Risk (Low < 19, Mod (19-27), High > 27): Readmission Risk Score: 8.7        Current Transitional Plan    [] Home Independently    [] Home with HC    [x] Skilled Nursing Facility    [] Acute Rehabilitation    [] Long Term Acute Care (LTAC)    [] Other:     Plan for the Stay (Medical Management) :    Stable for discharge.       Workflow Continuation (Additional Notes) :    HENS completed    [facility] is able to accept once they receive a copy of the approved precert from the overturned appeal. Will work on transportation arrangements [facility].     [facility]     Saman Saldana  May 30, 2025

## 2025-05-30 NOTE — DISCHARGE SUMMARY
[facility]     Department of Neurology    INPATIENT DISCHARGE SUMMARY        Patient Identification:  Purvi Ray is a 54 y.o. female.  :  1970  MRN: [id number]     Acct: [id number]   Admit Date:  2025  Discharge date and time: 2025 12:53 PM   Attending Provider: PRANAY MOODY MD                                 Admission Diagnoses:   Hypokalemia [E87.6]  Hypomagnesemia [E83.42]  Altered mental status [R41.82]  Altered mental status, unspecified altered mental status type [R41.82]    Discharge Diagnoses:   Principal Problem:    Altered mental status  Active Problems:    Acute encephalopathy    Hypomagnesemia    Meningitis  Resolved Problems:    * No resolved hospital problems. *       Consults:   ID    Brief Inpatient course:  HPI:  54-year-old woman with history of hypertension, diabetes mellitus, HSV, and alcohol use with alcohol-related seizures was found down on the street next to a bottle of vodka. Details surrounding the event are unclear. EMS was called for confusion; last known well is unknown. On initial evaluation, patient was alert to name only, unable to state the date, month, or year. She was unable to perform simple calculations or repeat phrases. Cranial nerves were grossly intact, strength was symmetrical. Speech was fluent but comprehension was impaired. No clear dysarthria. She held upper extremities antigravity and withdrew to pain in the lower extremities. NIHSS was 8. BP in the 150s, glucose 128. CT head showed no acute abnormalities.     Differential at presentation included Wernicke’s encephalopathy, alcohol-related delirium, postictal confusion, HSV encephalitis, and toxic-metabolic encephalopathy. Initial labs notable for hypokalemia (2.4), hypomagnesemia (1.5), hypocalcemia (8.2), hyponatremia, metabolic acidosis. Ammonia was 45, TSH 1.53. Toxicology screen was negative.     Following CT, her mental status improved--she was able to state her

## 2025-05-30 NOTE — PROGRESS NOTES
Nursing handoff report attempted x1 to [facility], no answer from nurses station. Call back number left on voicemail. Patient discharged to [facility] via [facility].

## 2025-05-30 NOTE — PROGRESS NOTES
[facility] -   Infectious diseases evaluation  admission date 5/16/2025    reason for consultation:   Possible meningitis    Impression :   Current:  Found down - acute encephalopathy 5/16 possible meningitis vs autoimmune encephalitis vs Korsakoff  5/19 LP difficult twice - 2 cc bloody fluid removed - CSF PCR neg  Wakes up confused  MRI initially suspicious for bitemporal high signal - repeat MRI T2 flair less suspicious for infection -  Post IVIG x 4 days  - better mentally 5/28  Seizure - possibly post ictal  Etoh abuse concern for Wernicke      Other:  DM2 - asthma-  Discussion / summary of stay / plan of care/ Recommendations:     HENCE:   CSF PCR neg few days after admission - hence stop acyclovir since HSv is less likely  Keep ceftriaxone  5/17 - till 5/23 - 7 days - completed  5/21 LP under anesthesia today, 2 CC fluid  only - limited studies  autoimmune encephalitis vs Korsakoff-  Post IVIG x 4 days and vitamins - better mentally 5/28 5/28 -5/29 ox3 - possible Korsakoff?  Ok for DC    Infection Control Recommendations   [place] Precautions    Antimicrobial Stewardship Recommendations   Simplification of therapy  Targeted therapy  History of Present Illness:   Initial history:  Purvi Ray is a 54 y.o.-year-old female   Found down w a bottle on the street not responding - acute encephalopathy 5/16 - no fever - and WBc low normal- metabolic acidosis noticed and electrolyte abnormalities corrected -  CXR and CTAP chest are all negative  5/19 LP difficult twice - 2 cc bloody fluid removed - CSF PCR neg  Wakes up confused  MRI initially suspicious for bitemporal high signal - repeat MRI T2 flair less suspicious for infection -  Seizure - possibly post ictal  Etoh abuse concern for Wernicke  Metabolic acidosis as well and hypokalemia    Started on acyclovir and ceftriaxone CNS dosing since 5/17  Still confused -  ID called for AB  Diabetes Mother     Heart Disease Mother     Stomach Cancer Maternal Grandmother     Cancer Maternal Grandmother     Diabetes Brother       Medical Decision Making:   I have independently reviewed/ordered the following labs:    CBC with Differential:   Recent Labs     05/28/25  0923 05/29/25 0519   WBC 4.9 4.8   HGB 12.9 12.8   HCT 39.6 38.9    268   LYMPHOPCT 28 33   MONOPCT 9 8   EOSPCT 2 3     BMP:  Recent Labs     05/28/25 0923 05/29/25 0519    136   K 4.3 4.3    106   CO2 18* 19*   BUN 23* 21*   CREATININE 0.8 0.8     Hepatic Function Panel: No results for input(s): \"LABALBU\", \"BILIDIR\", \"IBILI\", \"BILITOT\", \"ALKPHOS\", \"ALT\", \"AST\" in the last 72 hours.    Invalid input(s): \"PROT\"  No results for input(s): \"RPR\" in the last 72 hours.  No results for input(s): \"HIV\" in the last 72 hours.  No results for input(s): \"BC\" in the last 72 hours.  Lab Results   Component Value Date/Time    CREATININE 0.8 05/29/2025 05:19 AM    GLUCOSE 90 05/29/2025 05:19 AM       Detailed results:        Thank you for allowing us to participate in the care of this patient.Please call with questions.    This note is created with the assistance of a speech recognition program.  While intending to generate adocument that actually reflects the content of the visit, the document can still have some errors including those of syntax and sound a like substitutions which may escape proof reading.  It such instances, actual meaningcan be extrapolated by contextual diversion.    Margaret Sher MD  Office: [phone]  Perfect serve / office [phone]

## 2025-05-30 NOTE — PROGRESS NOTES
[facility] Neurology   IN-PATIENT SERVICE  General Neurology Progress Note   [facility]                Date:   5/30/2025  Patient name:  Purvi Ray  Date of admission:  5/16/2025 10:13 AM  MRN:   [id number]  Account:  [id number]  YOB: 1970  PCP:    Rebecca Leong MD  Room:   [address] [place] [address]  Code Status:    Full Code  Chief Complaint:   Chief Complaint   Patient presents with    Altered Mental Status       Interval history      Patient was seen and examined at bedside, exam unchanged. Continue to be pleasantly confused, constantly confabulating    Appeal overturned, patient will be going to [facility]/SNF  Discharge orders in place.    Brief History     54-year-old woman with history of hypertension, diabetes mellitus, HSV, and alcohol use with alcohol-related seizures was found down on the street next to a bottle of vodka. Details surrounding the event are unclear. EMS was called for confusion; last known well is unknown. On initial evaluation, patient was alert to name only, unable to state the date, month, or year. She was unable to perform simple calculations or repeat phrases. Cranial nerves were grossly intact, strength was symmetrical. Speech was fluent but comprehension was impaired. No clear dysarthria. She held upper extremities antigravity and withdrew to pain in the lower extremities. NIHSS was 8. BP in the 150s, glucose 128. CT head showed no acute abnormalities.    Differential at presentation included Wernicke’s encephalopathy, alcohol-related delirium, postictal confusion, HSV encephalitis, and toxic-metabolic encephalopathy. Initial labs notable for hypokalemia (2.4), hypomagnesemia (1.5), hypocalcemia (8.2), hyponatremia, metabolic acidosis. Ammonia was 45, TSH 1.53. Toxicology screen was negative.    Following CT, her mental status improved--she was able to state her age, follow simple commands, and move all extremities without clear focal  deficits.    MRI brain w/wo contrast showed high signal in the bilateral medial temporal lobes concerning for artifact vs HSV encephalitis. EEG (awake and sleep) and CSF analysis pending.       Review of Systems   Review of Systems   Constitutional:  Negative for chills, fatigue (\"feeling not like herself\") and fever.   HENT:  Negative for congestion, sinus pressure, sinus pain and sore throat.    Respiratory:  Negative for shortness of breath.    Cardiovascular:  Negative for chest pain.   Gastrointestinal:  Negative for abdominal pain, diarrhea, nausea and vomiting.   Genitourinary:  Negative for dysuria and hematuria.   Neurological:  Negative for dizziness, tremors, seizures, weakness, light-headedness, numbness and headaches.   Psychiatric/Behavioral:  Positive for confusion.        Past Medical History:   Past Medical History:   Diagnosis Date    Asthma     Diabetes mellitus (HCC)     on no medications    HSV (herpes simplex virus) infection     HTN (hypertension)     Seasonal allergies      Past Surgical History:   Past Surgical History:   Procedure Laterality Date     SECTION      DILATION AND CURETTAGE OF UTERUS      x 2    STERILIZATION  2007    hysteroscopic blockage of left fallopian tube     Past Family History:   Family History   Problem Relation Age of Onset    Diabetes Mother     Heart Disease Mother     Stomach Cancer Maternal Grandmother     Cancer Maternal Grandmother     Diabetes Brother      Social History:  reports that she has been smoking cigarettes. She has a 10 pack-year smoking history. She has never used smokeless tobacco. She reports current alcohol use. She reports that she does not use drugs.    Medications:  Prior to Admission medications    Medication Sig Start Date End Date Taking? Authorizing Provider   QUEtiapine (SEROQUEL) 25 MG tablet Take 1 tablet by mouth nightly 25  Yes Onesimo Chinchilla MD   Multiple Vitamin (MULTIVITAMIN) TABS tablet Take 1 tablet by